# Patient Record
Sex: FEMALE | Race: WHITE | NOT HISPANIC OR LATINO | Employment: OTHER | URBAN - METROPOLITAN AREA
[De-identification: names, ages, dates, MRNs, and addresses within clinical notes are randomized per-mention and may not be internally consistent; named-entity substitution may affect disease eponyms.]

---

## 2017-01-10 ENCOUNTER — CLINICAL SUPPORT (OUTPATIENT)
Dept: OBSTETRICS AND GYNECOLOGY | Facility: CLINIC | Age: 47
End: 2017-01-10
Payer: MEDICAID

## 2017-01-10 VITALS — BODY MASS INDEX: 51.64 KG/M2 | WEIGHT: 280.63 LBS | HEIGHT: 62 IN

## 2017-01-10 DIAGNOSIS — N95.1 MENOPAUSAL STATE: Primary | ICD-10-CM

## 2017-01-10 PROCEDURE — 99999 PR PBB SHADOW E&M-EST. PATIENT-LVL III: CPT | Mod: PBBFAC,,,

## 2017-01-10 PROCEDURE — 96372 THER/PROPH/DIAG INJ SC/IM: CPT | Mod: PBBFAC

## 2017-01-10 PROCEDURE — 99213 OFFICE O/P EST LOW 20 MIN: CPT | Mod: PBBFAC

## 2017-01-10 RX ORDER — TESTOSTERONE CYPIONATE 200 MG/ML
50 INJECTION, SOLUTION INTRAMUSCULAR
Status: COMPLETED | OUTPATIENT
Start: 2017-01-10 | End: 2017-01-10

## 2017-01-10 RX ADMIN — TESTOSTERONE CYPIONATE 50 MG: 200 INJECTION, SOLUTION INTRAMUSCULAR at 01:01

## 2017-01-10 RX ADMIN — ESTRADIOL CYPIONATE 5 MG: 5 INJECTION INTRAMUSCULAR at 01:01

## 2017-01-24 ENCOUNTER — TELEPHONE (OUTPATIENT)
Dept: OBSTETRICS AND GYNECOLOGY | Facility: CLINIC | Age: 47
End: 2017-01-24

## 2017-01-24 NOTE — TELEPHONE ENCOUNTER
1/24/17 @ 09:51 am cst--  Spoke with Ms Arreola, she stated she has been having a yeast infection, itching really bad, she stated she tried douching, informed to not to douche at this time. Appt made for 1/26/17 @ 10:45am , informed her she can try eating yogurt to put good kannan back into her system to help with control yeast growth, continue to use vaginal cream externally to help with itching. Pt stated her understanding          ----- Message from Darryl Andrews sent at 1/24/2017  9:30 AM CST -----  Contact: Self  Pt states she's having vaginal itching & believes she has a yeast infection. Pt states she douched on last night & wants to know if that will affect the infection. Pt can be reached @ [phone]

## 2017-01-26 ENCOUNTER — OFFICE VISIT (OUTPATIENT)
Dept: OBSTETRICS AND GYNECOLOGY | Facility: CLINIC | Age: 47
End: 2017-01-26
Payer: MEDICAID

## 2017-01-26 ENCOUNTER — TELEPHONE (OUTPATIENT)
Dept: OBSTETRICS AND GYNECOLOGY | Facility: CLINIC | Age: 47
End: 2017-01-26

## 2017-01-26 VITALS
HEIGHT: 62 IN | DIASTOLIC BLOOD PRESSURE: 74 MMHG | SYSTOLIC BLOOD PRESSURE: 144 MMHG | BODY MASS INDEX: 51.89 KG/M2 | WEIGHT: 282 LBS

## 2017-01-26 DIAGNOSIS — Z90.710 STATUS POST HYSTERECTOMY: ICD-10-CM

## 2017-01-26 DIAGNOSIS — N76.1 CHRONIC VAGINITIS: ICD-10-CM

## 2017-01-26 DIAGNOSIS — N95.1 MENOPAUSAL STATE: Primary | ICD-10-CM

## 2017-01-26 PROCEDURE — 99213 OFFICE O/P EST LOW 20 MIN: CPT | Mod: PBBFAC | Performed by: OBSTETRICS & GYNECOLOGY

## 2017-01-26 PROCEDURE — 87480 CANDIDA DNA DIR PROBE: CPT

## 2017-01-26 PROCEDURE — 99213 OFFICE O/P EST LOW 20 MIN: CPT | Mod: S$PBB,,, | Performed by: OBSTETRICS & GYNECOLOGY

## 2017-01-26 PROCEDURE — 99999 PR PBB SHADOW E&M-EST. PATIENT-LVL III: CPT | Mod: PBBFAC,,, | Performed by: OBSTETRICS & GYNECOLOGY

## 2017-01-26 RX ORDER — TERCONAZOLE 80 MG/1
80 SUPPOSITORY VAGINAL DAILY
Qty: 3 SUPPOSITORY | Refills: 1 | Status: SHIPPED | OUTPATIENT
Start: 2017-01-26 | End: 2017-07-27

## 2017-01-26 NOTE — MR AVS SNAPSHOT
[facility]  [address], [address]  [place] [address]  Phone: [phone]                  Haydee Arreola   2017 10:45 AM   Office Visit    Description:  Female : 1970   Provider:  Carlos Delgado MD   Department:  [facility]           Reason for Visit     Vaginal Discharge           Diagnoses this Visit        Comments    Menopausal state    -  Primary     Status post hysterectomy         Chronic vaginitis                To Do List           Future Appointments        Provider Department Dept Phone    2017 1:00 PM INJECTION, WB OB-GYN [facility]/ -[phone]      Goals (5 Years of Data)     None      Follow-Up and Disposition     Return in about 2 weeks (around 2017).       These Medications        Disp Refills Start End    terconazole (TERAZOL 3) 80 mg vaginal suppository 3 suppository 1 2017     Place 1 suppository (80 mg total) vaginally once daily. - Vaginal    Pharmacy: [facility] - [facility] [address] Ph #: [phone]         [facility] On Call     [facility] On Call Nurse Care Line -  Assistance  Registered nurses in the [facility] On Call Center provide clinical advisement, health education, appointment booking, and other advisory services.  Call for this free service at [phone].             Medications           Message regarding Medications     Verify the changes and/or additions to your medication regime listed below are the same as discussed with your clinician today.  If any of these changes or additions are incorrect, please notify your healthcare provider.        START taking these NEW medications        Refills    terconazole (TERAZOL 3) 80 mg vaginal suppository 1    Sig: Place 1 suppository (80 mg total) vaginally once daily.    Class: Normal    Route: Vaginal           Verify that the below list of medications is an accurate representation of the medications you are currently taking.  " If none reported, the list may be blank. If incorrect, please contact your healthcare provider. Carry this list with you in case of emergency.           Current Medications     ADVAIR DISKUS 500-50 mcg/dose DsDv diskus inhaler Inhale 1 puff into the lungs 2 (two) times daily.    albuterol (ACCUNEB) 0.63 mg/3 mL Nebu Take 0.63 mg by nebulization every 6 (six) hours as needed.    alprazolam (XANAX) 1 MG tablet Take 1 mg by mouth 5 (five) times daily.    carisoprodol (SOMA) 350 MG tablet Take 350 mg by mouth 4 (four) times daily as needed.    diazepam (VALIUM) 10 MG Tab Take 10 mg by mouth 2 (two) times daily.    estradiol valerate (DELESTROGEN) 20 mg/mL injection Inject 1 mL (20 mg total) into the muscle every 28 days.    etodolac (LODINE) 200 MG Cap Take 200 mg by mouth 3 (three) times daily.    fluticasone-salmeterol 250-50 mcg/dose (ADVAIR) 250-50 mcg/dose diskus inhaler Inhale 1 puff into the lungs 2 (two) times daily.    hydrocodone-acetaminophen 10-325mg (NORCO)  mg Tab Take by mouth.    levothyroxine (SYNTHROID) 25 MCG tablet Take 25 mcg by mouth once daily. Unsure of doseage    meperidine (DEMEROL) 100 MG tablet Take 1 tablet (100 mg total) by mouth every 4 (four) hours as needed.    pantoprazole (PROTONIX) 40 MG tablet Take 40 mg by mouth once daily.    terconazole (TERAZOL 3) 80 mg vaginal suppository Place 1 suppository (80 mg total) vaginally once daily.    terconazole (TERAZOL 3) 80 mg vaginal suppository Place 1 suppository (80 mg total) vaginally once daily.    VENTOLIN HFA 90 mcg/actuation inhaler 2 puffs every 4 to 6 hours as needed.           Clinical Reference Information           Vital Signs - Last Recorded  Most recent update: 1/26/2017 11:00 AM by Radha Ruiz MA    BP Ht Wt LMP BMI    (!) 144/74 5' 2" (1.575 m) 127.9 kg (282 lb) 10/23/2013 51.58 kg/m2      Blood Pressure          Most Recent Value    BP  (!)  144/74      Allergies as of 1/26/2017     Codeine    Toradol [Ketorolac]    " Ultram [Tramadol]      Immunizations Administered on Date of Encounter - 1/26/2017     None      [facility] Sign-Up     Activating your [facility] account is as easy as 1-2-3!     1) Visit [email or web address], select Sign Up Now, enter this activation code and your date of birth, then select Next.  [email or web address]  Expires: 3/12/2017 10:57 AM      2) Create a username and password to use when you visit [facility] in the future and select a security question in case you lose your password and select Next.    3) Enter your e-mail address and click Sign Up!    Additional Information  If you have questions, please e-mail [email or web address] or call [phone] to talk to our [facility] staff. Remember, [facility] is NOT to be used for urgent needs. For medical emergencies, dial 911.         Smoking Cessation     If you would like to quit smoking:   You may be eligible for free services if you are a [place] resident and started smoking cigarettes before September 1, 1988.  Call the Smoking Cessation Trust (SCT) toll free at (502) 145-4771 or [phone] [phone].   Call 5-510-QUIT-NOW if you do not meet the above criteria.

## 2017-01-26 NOTE — TELEPHONE ENCOUNTER
----- Message from Capri Bello sent at 1/26/2017  2:27 PM CST -----  Contact: [facility] [place]  terconazole (TERAZOL 3) 80 mg vaginal suppository isn't covered by pt insurance by cream form is. Please call pharmacy to change. [phone]    01/26/2017 2:43 PM  Spoke with Ankit from [employer] and informed her she can substitute the suppositories for the cream

## 2017-01-26 NOTE — PROGRESS NOTES
Subjective:      Chief Complaint:  MENOPAUSAL  Chief Complaint   Patient presents with    Vaginal Discharge       Menstrual History:    OB History      Para Term  AB TAB SAB Ectopic Multiple Living    4         2          Menarche age: 11     Patient's last menstrual period was 10/23/2013.           Objective:        History of Present Illness AND  Examination detailed DICTATE:       HISTORY OF PRESENT ILLNESS:  The patient is a 46-year-old lady.  The patient is    4, para 2.  Post-hysterectomy, BSO, supracervical.  She is on   Depo-Estradiol replacement and testosterone replacement.  Pap smear in 2016 was   negative, mammogram was negative.    PAST HISTORY:  Problem list is reviewed.    PAST SURGICAL HISTORY:   x2, appendectomy, hysterectomy, BSO.    The patient had presented to the clinic because of vaginal itching and burning.    Last visit in 2016.  The patient was found to have candidal vaginitis.    PHYSICAL EXAMINATION:  VITAL SIGNS:  Blood pressure 126/76, weight 282.  ABDOMEN:  Soft.  No guarding, rebound tenderness.  PELVIC:  External normal.  Vulva normal.  Bartholin, urethral and [place] glands   are negative.  Vagina got slight whitish discharge.  Cervix present.  Uterus and   adnexa absent.  Good pelvic support.    IMPRESSION:  Most likely candidal vaginitis.    PLAN:  Vaginal culture.  We will give the patient some Terazol vaginal   suppository to be used nightly for three nights.  If the culture grows out any   other thing, then we will call in a proper medication at that time.      MAHAMED  dd: 2017 11:05:00 (CST)  td: 2017 08:38:25 (CST)  Doc ID   #[id number]  Job ID #[id number]    CC:           Assessment:      Diagnosis: VAGINITIS   MENOPAUSAL       Plan:      Return in 6  months

## 2017-01-27 LAB
CANDIDA RRNA VAG QL PROBE: POSITIVE
G VAGINALIS RRNA GENITAL QL PROBE: NEGATIVE
T VAGINALIS RRNA GENITAL QL PROBE: NEGATIVE

## 2017-01-31 ENCOUNTER — TELEPHONE (OUTPATIENT)
Dept: OBSTETRICS AND GYNECOLOGY | Facility: CLINIC | Age: 47
End: 2017-01-31

## 2017-01-31 NOTE — TELEPHONE ENCOUNTER
----- Message from Carlos Delgado MD sent at 1/30/2017 12:24 PM CST -----  Your vaginal culture is POS   FOR   YEAST    01/31/2017 2:44 PM  Pt aware she tested + for yeast and meds have been called into her pharmacy

## 2017-02-06 ENCOUNTER — TELEPHONE (OUTPATIENT)
Dept: OBSTETRICS AND GYNECOLOGY | Facility: CLINIC | Age: 47
End: 2017-02-06

## 2017-02-06 NOTE — TELEPHONE ENCOUNTER
----- Message from Elvia Charles sent at 2/6/2017  2:28 PM CST -----  Contact: self  Pt would like to speak to the nurse regarding about her injection. Pt can be reach at [phone]. Thanks!       02/06/2017 4:12 PM  Spoke with pt and informed her that injections will be in sometime at the end of this month

## 2017-02-08 ENCOUNTER — CLINICAL SUPPORT (OUTPATIENT)
Dept: OBSTETRICS AND GYNECOLOGY | Facility: CLINIC | Age: 47
End: 2017-02-08
Payer: MEDICAID

## 2017-02-08 VITALS
WEIGHT: 276.44 LBS | SYSTOLIC BLOOD PRESSURE: 132 MMHG | DIASTOLIC BLOOD PRESSURE: 84 MMHG | BODY MASS INDEX: 50.56 KG/M2

## 2017-02-08 DIAGNOSIS — Z79.890 POSTMENOPAUSAL HRT (HORMONE REPLACEMENT THERAPY): Primary | ICD-10-CM

## 2017-02-08 PROCEDURE — 99999 PR PBB SHADOW E&M-EST. PATIENT-LVL III: CPT | Mod: PBBFAC,,,

## 2017-02-08 PROCEDURE — 99213 OFFICE O/P EST LOW 20 MIN: CPT | Mod: PBBFAC

## 2017-02-08 PROCEDURE — 96372 THER/PROPH/DIAG INJ SC/IM: CPT | Mod: PBBFAC

## 2017-02-08 RX ORDER — TESTOSTERONE CYPIONATE 200 MG/ML
50 INJECTION, SOLUTION INTRAMUSCULAR ONCE
Status: COMPLETED | OUTPATIENT
Start: 2017-02-08 | End: 2017-02-08

## 2017-02-08 RX ADMIN — ESTRADIOL VALERATE 20 MG: 20 INJECTION, SOLUTION INTRAMUSCULAR at 01:02

## 2017-02-08 RX ADMIN — TESTOSTERONE CYPIONATE 50 MG: 200 INJECTION, SOLUTION INTRAMUSCULAR at 01:02

## 2017-02-08 NOTE — MR AVS SNAPSHOT
[facility]  [address]  [address]  [place] [address]  Phone: [phone]                  Haydee Arreola   2017 2:00 PM   Clinical Support    Description:  Female : 1970   Provider:  INJECTION, WB OB-GYN   Department:  [facility] - [facility]/ [facility]           Reason for Visit     Injections                To Do List           Future Appointments        Provider Department Dept Phone    2017 2:00 PM INJECTION,  OB[facility]N [facility] -[phone]    3/7/2017 2:00 PM INJECTION,  OB-GYN [facility]/ -[phone]      Goals (5 Years of Data)     None      [facility] On Call     [facility] On [facility] Nurse [facility] Line -  Assistance  Registered nurses in the [facility] Call Center provide clinical advisement, health education, appointment booking, and other advisory services.  Call for this free service at [phone].             Medications           Message regarding Medications     Verify the changes and/or additions to your medication regime listed below are the same as discussed with your clinician today.  If any of these changes or additions are incorrect, please notify your healthcare provider.             Verify that the below list of medications is an accurate representation of the medications you are currently taking.  If none reported, the list may be blank. If incorrect, please contact your healthcare provider. Carry this list with you in case of emergency.           Current Medications     ADVAIR DISKUS 500-50 mcg/dose DsDv diskus inhaler Inhale 1 puff into the lungs 2 (two) times daily.    albuterol (ACCUNEB) 0.63 mg/3 mL Nebu Take 0.63 mg by nebulization every 6 (six) hours as needed.    alprazolam (XANAX) 1 MG tablet Take 1 mg by mouth 5 (five) times daily.    carisoprodol (SOMA) 350 MG tablet Take 350 mg by mouth 4 (four) times daily as needed.    diazepam (VALIUM) 10 MG Tab Take 10 mg by mouth 2 (two) times daily.    estradiol valerate (DELESTROGEN) 20  mg/mL injection Inject 1 mL (20 mg total) into the muscle every 28 days.    etodolac (LODINE) 200 MG Cap Take 200 mg by mouth 3 (three) times daily.    fluticasone-salmeterol 250-50 mcg/dose (ADVAIR) 250-50 mcg/dose diskus inhaler Inhale 1 puff into the lungs 2 (two) times daily.    hydrocodone-acetaminophen 10-325mg (NORCO)  mg Tab Take by mouth.    levothyroxine (SYNTHROID) 25 MCG tablet Take 25 mcg by mouth once daily. Unsure of doseage    meperidine (DEMEROL) 100 MG tablet Take 1 tablet (100 mg total) by mouth every 4 (four) hours as needed.    pantoprazole (PROTONIX) 40 MG tablet Take 40 mg by mouth once daily.    terconazole (TERAZOL 3) 80 mg vaginal suppository Place 1 suppository (80 mg total) vaginally once daily.    terconazole (TERAZOL 3) 80 mg vaginal suppository Place 1 suppository (80 mg total) vaginally once daily.    VENTOLIN HFA 90 mcg/actuation inhaler 2 puffs every 4 to 6 hours as needed.           Clinical Reference Information           Your Vitals Were     BP Weight Last Period BMI       132/84 125.4 kg (276 lb 7.3 oz) 10/23/2013 50.56 kg/m2       Blood Pressure          Most Recent Value    BP  132/84      Allergies as of 2/8/2017     Codeine    Toradol [Ketorolac]    Ultram [Tramadol]      Immunizations Administered on Date of Encounter - 2/8/2017     None      Administrations This Visit     estradiol valerate (DELESTROGEN) injection 20 mg/mL     Admin Date Action Dose Route Administered By             02/08/2017 Given 20 mg Intramuscular Kamar Aiken LPN                      [facility] Sign-Up     Activating your [facility] account is as easy as 1-2-3!     1) Visit [email or web address], select Sign Up Now, enter this activation code and your date of birth, then select Next.  [email or web address]  Expires: 3/12/2017 10:57 AM      2) Create a username and password to use when you visit [facility] in the future and select a security question in case you lose your password and select Next.    3)  Enter your e-mail address and click Sign Up!    Additional Information  If you have questions, please e-mail [email or web address] or call [phone] to talk to our [facility] staff. Remember, [facility] is NOT to be used for urgent needs. For medical emergencies, dial 911.         Smoking Cessation     If you would like to quit smoking:   You may be eligible for free services if you are a [place] resident and started smoking cigarettes before September 1, 1988.  Call the Smoking Cessation Trust ([facility]) toll free at (065) 466-6845 or (775) 402-3031.   Call 5-149-QUIT-NOW if you do not meet the above criteria.            Language Assistance Services     ATTENTION: Language assistance services are available, free of charge. Please call [phone].      ATENCIÓN: Si habla leticia, tiene a grayson disposición servicios gratuitos de asistencia lingüística. Llame al [phone].     CHÚ Ý: N?u b?n nói Ti?ng Vi?t, có các d?ch v? h? tr? ngôn ng? mi?n phí dành cho b?n. G?i s? [phone].         [facility] complies with applicable Federal civil rights laws and does not discriminate on the basis of race, color, national origin, age, disability, or sex.

## 2017-03-07 ENCOUNTER — TELEPHONE (OUTPATIENT)
Dept: OBSTETRICS AND GYNECOLOGY | Facility: CLINIC | Age: 47
End: 2017-03-07

## 2017-03-08 ENCOUNTER — TELEPHONE (OUTPATIENT)
Dept: OBSTETRICS AND GYNECOLOGY | Facility: CLINIC | Age: 47
End: 2017-03-08

## 2017-03-09 ENCOUNTER — CLINICAL SUPPORT (OUTPATIENT)
Dept: OBSTETRICS AND GYNECOLOGY | Facility: CLINIC | Age: 47
End: 2017-03-09
Payer: MEDICAID

## 2017-03-09 VITALS — DIASTOLIC BLOOD PRESSURE: 64 MMHG | WEIGHT: 273.38 LBS | SYSTOLIC BLOOD PRESSURE: 132 MMHG | BODY MASS INDEX: 50 KG/M2

## 2017-03-09 DIAGNOSIS — Z79.890 POSTMENOPAUSAL HRT (HORMONE REPLACEMENT THERAPY): Primary | ICD-10-CM

## 2017-03-09 PROCEDURE — 99999 PR PBB SHADOW E&M-EST. PATIENT-LVL III: CPT | Mod: PBBFAC,,,

## 2017-03-09 PROCEDURE — 96372 THER/PROPH/DIAG INJ SC/IM: CPT | Mod: PBBFAC

## 2017-03-09 PROCEDURE — 99213 OFFICE O/P EST LOW 20 MIN: CPT | Mod: PBBFAC

## 2017-03-09 RX ORDER — TESTOSTERONE CYPIONATE 200 MG/ML
50 INJECTION, SOLUTION INTRAMUSCULAR ONCE
Status: COMPLETED | OUTPATIENT
Start: 2017-03-09 | End: 2017-03-09

## 2017-03-09 RX ADMIN — ESTRADIOL CYPIONATE 5 MG: 5 INJECTION INTRAMUSCULAR at 11:03

## 2017-03-09 RX ADMIN — TESTOSTERONE CYPIONATE 50 MG: 200 INJECTION, SOLUTION INTRAMUSCULAR at 11:03

## 2017-03-09 NOTE — PROGRESS NOTES
Pt arrived for her HRT injection ( estradiol & depo testosterone) , injection given w/o and incident,

## 2017-03-09 NOTE — MR AVS SNAPSHOT
[facility]  [address]  [address]  [place] [address]  Phone: [phone]                  Haydee Arreola   3/9/2017 11:00 AM   Clinical Support    Description:  Female : 1970   Provider:  INJECTION, WB OB-GYN   Department:  [facility]           Reason for Visit     Injections           Diagnoses this Visit        Comments    Postmenopausal HRT (hormone replacement therapy)    -  Primary            To Do List           Future Appointments        Provider Department Dept Phone    2017 1:00 PM INJECTION,  OB-GYN [facility]/ -[phone]      Goals (5 Years of Data)     None      [facility] On [facility]     [facility] On Call Nurse Care Line -  Assistance  Registered nurses in the [facility] On [facility] Center provide clinical advisement, health education, appointment booking, and other advisory services.  Call for this free service at [phone].             Medications           Message regarding Medications     Verify the changes and/or additions to your medication regime listed below are the same as discussed with your clinician today.  If any of these changes or additions are incorrect, please notify your healthcare provider.        These medications were administered today        Dose Freq    testosterone cypionate injection 50 mg 50 mg Once    Sig: Inject 0.25 mLs (50 mg total) into the muscle once.    Class: Normal    Route: Intramuscular           Verify that the below list of medications is an accurate representation of the medications you are currently taking.  If none reported, the list may be blank. If incorrect, please contact your healthcare provider. Carry this list with you in case of emergency.           Current Medications     ADVAIR DISKUS 500-50 mcg/dose DsDv diskus inhaler Inhale 1 puff into the lungs 2 (two) times daily.    albuterol (ACCUNEB) 0.63 mg/3 mL Nebu Take 0.63 mg by nebulization every 6 (six) hours as needed.    alprazolam (XANAX) 1  MG tablet Take 1 mg by mouth 5 (five) times daily.    carisoprodol (SOMA) 350 MG tablet Take 350 mg by mouth 4 (four) times daily as needed.    diazepam (VALIUM) 10 MG Tab Take 10 mg by mouth 2 (two) times daily.    estradiol valerate (DELESTROGEN) 20 mg/mL injection Inject 1 mL (20 mg total) into the muscle every 28 days.    etodolac (LODINE) 200 MG Cap Take 200 mg by mouth 3 (three) times daily.    fluticasone-salmeterol 250-50 mcg/dose (ADVAIR) 250-50 mcg/dose diskus inhaler Inhale 1 puff into the lungs 2 (two) times daily.    hydrocodone-acetaminophen 10-325mg (NORCO)  mg Tab Take by mouth.    levothyroxine (SYNTHROID) 25 MCG tablet Take 25 mcg by mouth once daily. Unsure of doseage    meperidine (DEMEROL) 100 MG tablet Take 1 tablet (100 mg total) by mouth every 4 (four) hours as needed.    pantoprazole (PROTONIX) 40 MG tablet Take 40 mg by mouth once daily.    terconazole (TERAZOL 3) 80 mg vaginal suppository Place 1 suppository (80 mg total) vaginally once daily.    terconazole (TERAZOL 3) 80 mg vaginal suppository Place 1 suppository (80 mg total) vaginally once daily.    VENTOLIN HFA 90 mcg/actuation inhaler 2 puffs every 4 to 6 hours as needed.           Clinical Reference Information           Your Vitals Were     BP Weight Last Period BMI       132/64 124 kg (273 lb 5.9 oz) 10/23/2013 50 kg/m2       Blood Pressure          Most Recent Value    BP  132/64      Allergies as of 3/9/2017     Codeine    Toradol [Ketorolac]    Ultram [Tramadol]      Immunizations Administered on Date of Encounter - 3/9/2017     None      Administrations This Visit     estradiol cypionate 5 mg/mL injection 5 mg     Admin Date Action Dose Route Administered By             03/09/2017 Given 5 mg Intramuscular Kamar Jackson LPN                    testosterone cypionate injection 50 mg     Admin Date Action Dose Route Administered By             03/09/2017 Given 50 mg Intramuscular Kamar Jackson LPN                       [facility] Sign-Up     Activating your [facility] account is as easy as 1-2-3!     1) Visit [email or web address], select Sign Up Now, enter this activation code and your date of birth, then select Next.  [email or web address]  Expires: 3/12/2017 10:57 AM      2) Create a username and password to use when you visit [facility] in the future and select a security question in case you lose your password and select Next.    3) Enter your e-mail address and click Sign Up!    Additional Information  If you have questions, please e-mail [email or web address] or call [phone] to talk to our [facility] staff. Remember, [facility] is NOT to be used for urgent needs. For medical emergencies, dial 911.         Smoking Cessation     If you would like to quit smoking:   You may be eligible for free services if you are a [place] resident and started smoking cigarettes before September 1, 1988.  Call the Smoking Cessation Trust (SCT) toll free at (299) 557-8200 or ([phone] 173-8829.   Call 6-741-QUIT-NOW if you do not meet the above criteria.            Language Assistance Services     ATTENTION: Language assistance services are available, free of charge. Please call [phone].      ATENCIÓN: Si habla español, tiene a grayson disposición servicios gratuitos de asistencia lingüística. Llame al [phone].     CHÚ Ý: N?u b?n nói Ti?ng Vi?t, có các d?ch v? h? tr? ngôn ng? mi?n phí dành cho b?n. G?i s? [phone].         [facility] complies with applicable Federal civil rights laws and does not discriminate on the basis of race, color, national origin, age, disability, or sex.

## 2017-04-06 ENCOUNTER — CLINICAL SUPPORT (OUTPATIENT)
Dept: OBSTETRICS AND GYNECOLOGY | Facility: CLINIC | Age: 47
End: 2017-04-06
Payer: MEDICAID

## 2017-04-06 VITALS
SYSTOLIC BLOOD PRESSURE: 138 MMHG | DIASTOLIC BLOOD PRESSURE: 74 MMHG | BODY MASS INDEX: 50.97 KG/M2 | WEIGHT: 278.69 LBS

## 2017-04-06 DIAGNOSIS — Z79.890 POSTMENOPAUSAL HRT (HORMONE REPLACEMENT THERAPY): ICD-10-CM

## 2017-04-06 PROCEDURE — 99999 PR PBB SHADOW E&M-EST. PATIENT-LVL III: CPT | Mod: PBBFAC,,,

## 2017-04-06 PROCEDURE — 96372 THER/PROPH/DIAG INJ SC/IM: CPT | Mod: PBBFAC

## 2017-04-06 PROCEDURE — 99213 OFFICE O/P EST LOW 20 MIN: CPT | Mod: PBBFAC,25

## 2017-04-06 RX ORDER — TESTOSTERONE CYPIONATE 200 MG/ML
50 INJECTION, SOLUTION INTRAMUSCULAR ONCE
Status: COMPLETED | OUTPATIENT
Start: 2017-04-06 | End: 2017-04-06

## 2017-04-06 RX ORDER — TESTOSTERONE CYPIONATE 200 MG/ML
50 INJECTION, SOLUTION INTRAMUSCULAR
Status: COMPLETED | OUTPATIENT
Start: 2017-05-04 | End: 2017-10-26

## 2017-04-06 RX ADMIN — ESTRADIOL CYPIONATE 5 MG: 5 INJECTION INTRAMUSCULAR at 01:04

## 2017-04-06 RX ADMIN — TESTOSTERONE CYPIONATE 50 MG: 200 INJECTION, SOLUTION INTRAMUSCULAR at 01:04

## 2017-05-04 ENCOUNTER — OFFICE VISIT (OUTPATIENT)
Dept: OBSTETRICS AND GYNECOLOGY | Facility: CLINIC | Age: 47
End: 2017-05-04
Payer: MEDICAID

## 2017-05-04 ENCOUNTER — CLINICAL SUPPORT (OUTPATIENT)
Dept: OBSTETRICS AND GYNECOLOGY | Facility: CLINIC | Age: 47
End: 2017-05-04
Payer: MEDICAID

## 2017-05-04 VITALS
SYSTOLIC BLOOD PRESSURE: 138 MMHG | WEIGHT: 278 LBS | DIASTOLIC BLOOD PRESSURE: 81 MMHG | HEIGHT: 62 IN | BODY MASS INDEX: 51.16 KG/M2

## 2017-05-04 VITALS — SYSTOLIC BLOOD PRESSURE: 138 MMHG | BODY MASS INDEX: 50.85 KG/M2 | DIASTOLIC BLOOD PRESSURE: 81 MMHG | WEIGHT: 278 LBS

## 2017-05-04 DIAGNOSIS — Z79.890 POSTMENOPAUSAL HRT (HORMONE REPLACEMENT THERAPY): Primary | ICD-10-CM

## 2017-05-04 DIAGNOSIS — N95.1 MENOPAUSAL STATE: ICD-10-CM

## 2017-05-04 DIAGNOSIS — N76.1 CHRONIC VAGINITIS: Primary | ICD-10-CM

## 2017-05-04 DIAGNOSIS — Z79.890 HORMONE REPLACEMENT THERAPY (HRT): ICD-10-CM

## 2017-05-04 DIAGNOSIS — Z90.710 STATUS POST HYSTERECTOMY: ICD-10-CM

## 2017-05-04 PROCEDURE — 99213 OFFICE O/P EST LOW 20 MIN: CPT | Mod: S$PBB,,, | Performed by: OBSTETRICS & GYNECOLOGY

## 2017-05-04 PROCEDURE — 96372 THER/PROPH/DIAG INJ SC/IM: CPT | Mod: PBBFAC

## 2017-05-04 PROCEDURE — 99999 PR PBB SHADOW E&M-EST. PATIENT-LVL III: CPT | Mod: PBBFAC,,,

## 2017-05-04 PROCEDURE — 99213 OFFICE O/P EST LOW 20 MIN: CPT | Mod: PBBFAC,27,25

## 2017-05-04 PROCEDURE — 99999 PR PBB SHADOW E&M-EST. PATIENT-LVL II: CPT | Mod: 25,PBBFAC,, | Performed by: OBSTETRICS & GYNECOLOGY

## 2017-05-04 RX ORDER — ESTRADIOL VALERATE 20 MG/ML
20 INJECTION INTRAMUSCULAR ONCE
Status: COMPLETED | OUTPATIENT
Start: 2017-05-04 | End: 2017-05-04

## 2017-05-04 RX ORDER — CLOTRIMAZOLE AND BETAMETHASONE DIPROPIONATE 10; .64 MG/G; MG/G
CREAM TOPICAL
Qty: 15 G | Refills: 1 | Status: SHIPPED | OUTPATIENT
Start: 2017-05-04 | End: 2017-07-27

## 2017-05-04 RX ADMIN — ESTRADIOL VALERATE 20 MG: 20 INJECTION INTRAMUSCULAR at 11:05

## 2017-05-04 RX ADMIN — TESTOSTERONE CYPIONATE 50 MG: 200 INJECTION, SOLUTION INTRAMUSCULAR at 11:05

## 2017-05-04 NOTE — PROGRESS NOTES
Subjective:      Chief Complaint:    Chief Complaint   Patient presents with    Vaginitis       Menstrual History:    OB History      Para Term  AB TAB SAB Ectopic Multiple Living    4         2          Menarche age: 11     Patient's last menstrual period was 10/23/2013.           Objective:        History of Present Illness AND  Examination detailed DICTATE:       PROBLEM-FOCUSSED EXAMINATION:  The patient is 46 years old, here for examination   and presented to clinic because of vulvar pruritus and itching.    PAST MEDICAL HISTORY:  Reviewed.    PROBLEM LIST:  Reviewed.  Multiple problems.    SURGERY:  , appendectomy, tonsillectomy, hysterectomy, BSO.    The patient is on Depo-Estradiol and Depo-Testosterone.  The patient stated that   she had used some sort of a preparation on the vulva about a week to 10 days   ago.    PHYSICAL EXAMINATION:  VITAL SIGNS:  Blood pressure 138/81, weight 278.  ABDOMEN:  Soft.  No guarding or rebound.  PELVIC:  External, the patient has vulvitis at the introitus and the labia   majora.  Bartholin, urethral and [place] glands are negative.  Vagina clear.  No   discharge.  No inflammation.  No odor.  Cervix, uterus and adnexa are absent.    No pain.    PLAN:  We will do a vaginal culture, rule out the possibility of infection.  In   the past, the patient had recurrent Candida albicans infection.  At the present,   my impression is vulvitis and possible contact.   We gave her Lotrisone   ointment apply twice a day for seven days.  If the culture grows out any   pathogens, we will treat it accordingly.      MAHAMED  dd: 2017 11:01:44 (CDT)  td: 2017 09:29:52 (CDT)  Doc ID   #[id number]  Job ID #[id number]    CC:           Assessment:      Diagnosis:VULVOVAGINITIS       Plan:      Return in 6  months

## 2017-05-04 NOTE — MR AVS SNAPSHOT
Follow-up and Disposition     Return in about 6 months (around 2017).           Haydee Arreola   2017 11:15 AM   Office Visit    Description:  Female : 1970   Provider:  Carlos Delgado MD   Department:  [facility] [place] - OB/ GYN           Reason for Visit     Vaginitis     Reason for Visit History      Diagnoses this Visit        Comments    Chronic vaginitis    -  Primary     Menopausal state         Status post hysterectomy         Hormone replacement therapy (HRT)           Problem List as of 2017     Encounter for routine gynecological examination    Inflammation of vagina    Change of life    History of hysterectomy    Nipple discharge    Sexual dysfunction    Postmenopausal vaginal dryness    Encounter for routine gynecological examination    Hormone replacement therapy (HRT)      Goals     None      Vital Signs/Measurements  Most recent update: 2017 10:47 AM by Cher Lugo MA    BP Wt BMI          138/81 126.1 kg (278 lb) 50.85 kg/m2           Medications and Orders      Your Current Medications Are        Disp Refills Start End    ADVAIR DISKUS 500-50 mcg/dose DsDv diskus inhaler  4 2015     Sig - Route: Inhale 1 puff into the lungs 2 (two) times daily. - Inhalation    Class: Historical Med    albuterol (ACCUNEB) 0.63 mg/3 mL Nebu        Sig - Route: Take 0.63 mg by nebulization every 6 (six) hours as needed. - Nebulization    Class: Historical Med    alprazolam (XANAX) 1 MG tablet        Sig - Route: Take 1 mg by mouth 5 (five) times daily. - Oral    Class: Historical Med    carisoprodol (SOMA) 350 MG tablet        Sig - Route: Take 350 mg by mouth 4 (four) times daily as needed. - Oral    Class: Historical Med    clotrimazole-betamethasone 1-0.05% (LOTRISONE) cream 15 g 1 2017    Sig: Apply to affected area 2 times daily    diazepam (VALIUM) 10 MG Tab        Sig - Route: Take 10 mg by mouth 2 (two) times daily. - Oral    Class: Historical Med    estradiol  valerate (DELESTROGEN) 20 mg/mL injection 1 mL 0 2016    Sig - Route: Inject 1 mL (20 mg total) into the muscle every 28 days. - Intramuscular    etodolac (LODINE) 200 MG Cap        Sig - Route: Take 200 mg by mouth 3 (three) times daily. - Oral    Class: Historical Med    fluticasone-salmeterol 250-50 mcg/dose (ADVAIR) 250-50 mcg/dose diskus inhaler        Sig - Route: Inhale 1 puff into the lungs 2 (two) times daily. - Inhalation    Class: Historical Med    hydrocodone-acetaminophen 10-325mg (NORCO)  mg Tab        Sig - Route: Take by mouth. - Oral    Class: Historical Med    levothyroxine (SYNTHROID) 25 MCG tablet        Sig - Route: Take 25 mcg by mouth once daily. Unsure of doseage - Oral    Class: Historical Med    meperidine (DEMEROL) 100 MG tablet 40 tablet 0 2013     Sig - Route: Take 1 tablet (100 mg total) by mouth every 4 (four) hours as needed. - Oral    Class: Print    pantoprazole (PROTONIX) 40 MG tablet        Sig - Route: Take 40 mg by mouth once daily. - Oral    Class: Historical Med    terconazole (TERAZOL 3) 80 mg vaginal suppository 6 suppository 1 2016     Sig - Route: Place 1 suppository (80 mg total) vaginally once daily. - Vaginal    terconazole (TERAZOL 3) 80 mg vaginal suppository 3 suppository 1 2017     Sig - Route: Place 1 suppository (80 mg total) vaginally once daily. - Vaginal    VENTOLIN HFA 90 mcg/actuation inhaler  4 2015     Si puffs every 4 to 6 hours as needed.    Class: Historical Med      Medications Ordered this Visit                   [facility] [place] [facility] - [address]   [address] [address]    Telephone:  [phone]   Fax:  [phone]   Hours:                  E-Prescribed (1 of )         clotrimazole-betamethasone 1-0.05% (LOTRISONE) cream    Sig: Apply to affected area 2 times daily       Start: 17     Quantity: 15 g Refills: 1                   Orders placed at this  visit     Vaginosis Screen by DNA Probe       To-Do List     Future Appointments Provider Department Dept Phone    5/4/2017 1:00 PM INJECTION,  OB-GYN [facility] [place] [facility]/ -[phone]    Arrive at check-in approximately 15 minutes before your scheduled appointment time. Bring all outside medical records and imaging, along with a list of your current medications and insurance card.    Follow-Up    Return in about 6 months (around 11/4/2017).      We Performed the Following     Vaginosis Screen by DNA Probe [VGS741 Custom]             Allergies as of 5/4/2017     Codeine    Toradol [Ketorolac]    Ultram [Tramadol]      Immunizations Administered on Date of Encounter - 5/4/2017     None      Future Appointments        Provider Department Center    5/4/2017 11:15 AM Carlos Delgado MD [facility] [place] [facility]    5/4/2017 1:00 PM INJECTION,  [facility] [place]         Result Summary      [facility] Sign-Up     Activating your [facility] account is as easy as 1-2-3!     1) Visit [email or web address], select Sign Up Now, enter this activation code and your date of birth, then select Next.  [email or web address]  Expires: 6/18/2017 10:47 AM      2) Create a username and password to use when you visit [facility] in the future and select a security question in case you lose your password and select Next.    3) Enter your e-mail address and click Sign Up!    Additional Information  If you have questions, please e-mail [email or web address] or call [phone] to talk to our [facility] staff. Remember, [facility] is NOT to be used for urgent needs. For medical emergencies, dial 911.

## 2017-05-04 NOTE — MR AVS SNAPSHOT
[facility]/ [facility]  [address] [facility] [address]  [place] [address]  Phone: [phone]                  Haydee Arreola   2017 11:15 AM   Office Visit    Description:  Female : 1970   Provider:  Carlos Delgado MD   Department:  [facility]/ GYN           Reason for Visit     Vaginitis                To Do List           Future Appointments        Provider Department Dept Phone    2017 11:15 AM Carlos Delgado MD [facility]/ -[phone]    2017 1:00 PM INJECTION, [facility] OB-GYN [facility] [phone]      Goals (5 Years of Data)     None      [facility] On Call     [facility] Nurse Care Line -  Assistance  Unless otherwise directed by your provider, please contact [facility], our nurse care line that is available for  assistance.     Registered nurses in the [facility] provide: appointment scheduling, clinical advisement, health education, and other advisory services.  Call: [phone] (toll free)               Medications           Message regarding Medications     Verify the changes and/or additions to your medication regime listed below are the same as discussed with your clinician today.  If any of these changes or additions are incorrect, please notify your healthcare provider.             Verify that the below list of medications is an accurate representation of the medications you are currently taking.  If none reported, the list may be blank. If incorrect, please contact your healthcare provider. Carry this list with you in case of emergency.           Current Medications     ADVAIR DISKUS 500-50 mcg/dose DsDv diskus inhaler Inhale 1 puff into the lungs 2 (two) times daily.    albuterol (ACCUNEB) 0.63 mg/3 mL Nebu Take 0.63 mg by nebulization every 6 (six) hours as needed.    alprazolam (XANAX) 1 MG tablet Take 1 mg by mouth 5 (five) times daily.    carisoprodol (SOMA) 350 MG tablet Take 350 mg by mouth 4 (four) times daily as  "needed.    diazepam (VALIUM) 10 MG Tab Take 10 mg by mouth 2 (two) times daily.    estradiol valerate (DELESTROGEN) 20 mg/mL injection Inject 1 mL (20 mg total) into the muscle every 28 days.    etodolac (LODINE) 200 MG Cap Take 200 mg by mouth 3 (three) times daily.    fluticasone-salmeterol 250-50 mcg/dose (ADVAIR) 250-50 mcg/dose diskus inhaler Inhale 1 puff into the lungs 2 (two) times daily.    hydrocodone-acetaminophen 10-325mg (NORCO)  mg Tab Take by mouth.    levothyroxine (SYNTHROID) 25 MCG tablet Take 25 mcg by mouth once daily. Unsure of doseage    meperidine (DEMEROL) 100 MG tablet Take 1 tablet (100 mg total) by mouth every 4 (four) hours as needed.    pantoprazole (PROTONIX) 40 MG tablet Take 40 mg by mouth once daily.    terconazole (TERAZOL 3) 80 mg vaginal suppository Place 1 suppository (80 mg total) vaginally once daily.    terconazole (TERAZOL 3) 80 mg vaginal suppository Place 1 suppository (80 mg total) vaginally once daily.    VENTOLIN HFA 90 mcg/actuation inhaler 2 puffs every 4 to 6 hours as needed.           Clinical Reference Information           Your Vitals Were     BP Height Weight Last Period BMI    138/81 5' 2" (1.575 m) 126.1 kg (278 lb) 10/23/2013 50.85 kg/m2      Blood Pressure          Most Recent Value    BP  138/81      Allergies as of 5/4/2017     Codeine    Toradol [Ketorolac]    Ultram [Tramadol]      Immunizations Administered on Date of Encounter - 5/4/2017     None      [facility] Sign-Up     Activating your [facility] account is as easy as 1-2-3!     1) Visit [email or web address], select Sign Up Now, enter this activation code and your date of birth, then select Next.  [email or web address]  Expires: 6/18/2017 10:47 AM      2) Create a username and password to use when you visit [facility] in the future and select a security question in case you lose your password and select Next.    3) Enter your e-mail address and click Sign Up!    Additional Information  If you have " questions, please e-mail [email or web address] or call [phone] to talk to our [facility] staff. Remember, [facility] is NOT to be used for urgent needs. For medical emergencies, dial 911.         Smoking Cessation     If you would like to quit smoking:   You may be eligible for free services if you are a [place] resident and started smoking cigarettes before September 1, 1988.  Call the Smoking Cessation Trust ([facility]) toll free at [phone] [phone] or [phone].   Call 1-800-QUIT-NOW if you do not meet the above criteria.   Contact us via email: [email or web address]   View our website for more information: [email or web address]        Language Assistance Services     ATTENTION: Language assistance services are available, free of charge. Please call [phone].      ATENCIÓN: Si habla español, tiene a grayson disposición servicios gratuitos de asistencia lingüística. Llame al [phone].     CHÚ Ý: N?u b?n nói Ti?ng Vi?t, có các d?ch v? h? tr? ngôn ng? mi?n phí dành cho b?n. G?i s? [phone].         [facility] complies with applicable Federal civil rights laws and does not discriminate on the basis of race, color, national origin, age, disability, or sex.

## 2017-05-05 LAB
CANDIDA RRNA VAG QL PROBE: NEGATIVE
G VAGINALIS RRNA GENITAL QL PROBE: NEGATIVE
T VAGINALIS RRNA GENITAL QL PROBE: NEGATIVE

## 2017-05-08 ENCOUNTER — TELEPHONE (OUTPATIENT)
Dept: OBSTETRICS AND GYNECOLOGY | Facility: CLINIC | Age: 47
End: 2017-05-08

## 2017-05-08 NOTE — TELEPHONE ENCOUNTER
----- Message from Carlos Delgado MD sent at 5/8/2017 12:18 PM CDT -----  Your vaginal culture is normal.

## 2017-05-22 RX ORDER — FLUCONAZOLE 150 MG/1
TABLET ORAL
Qty: 1 TABLET | OUTPATIENT
Start: 2017-05-22

## 2017-06-01 ENCOUNTER — OFFICE VISIT (OUTPATIENT)
Dept: OBSTETRICS AND GYNECOLOGY | Facility: CLINIC | Age: 47
End: 2017-06-01
Payer: MEDICAID

## 2017-06-01 ENCOUNTER — CLINICAL SUPPORT (OUTPATIENT)
Dept: OBSTETRICS AND GYNECOLOGY | Facility: CLINIC | Age: 47
End: 2017-06-01
Payer: MEDICAID

## 2017-06-01 VITALS
DIASTOLIC BLOOD PRESSURE: 76 MMHG | WEIGHT: 277.56 LBS | SYSTOLIC BLOOD PRESSURE: 140 MMHG | BODY MASS INDEX: 50.77 KG/M2

## 2017-06-01 VITALS
DIASTOLIC BLOOD PRESSURE: 81 MMHG | SYSTOLIC BLOOD PRESSURE: 143 MMHG | HEIGHT: 62 IN | BODY MASS INDEX: 50.97 KG/M2 | WEIGHT: 277 LBS

## 2017-06-01 DIAGNOSIS — Z90.710 STATUS POST HYSTERECTOMY: Primary | ICD-10-CM

## 2017-06-01 DIAGNOSIS — Z79.890 POST-MENOPAUSE ON HRT (HORMONE REPLACEMENT THERAPY): Primary | ICD-10-CM

## 2017-06-01 DIAGNOSIS — N76.3 CHRONIC VULVITIS: ICD-10-CM

## 2017-06-01 DIAGNOSIS — N76.1 CHRONIC VAGINITIS: ICD-10-CM

## 2017-06-01 PROCEDURE — 99213 OFFICE O/P EST LOW 20 MIN: CPT | Mod: S$PBB,,, | Performed by: OBSTETRICS & GYNECOLOGY

## 2017-06-01 PROCEDURE — 87480 CANDIDA DNA DIR PROBE: CPT

## 2017-06-01 PROCEDURE — 99999 PR PBB SHADOW E&M-EST. PATIENT-LVL IV: CPT | Mod: PBBFAC,,, | Performed by: OBSTETRICS & GYNECOLOGY

## 2017-06-01 PROCEDURE — 99999 PR PBB SHADOW E&M-EST. PATIENT-LVL III: CPT | Mod: PBBFAC,,,

## 2017-06-01 PROCEDURE — 99214 OFFICE O/P EST MOD 30 MIN: CPT | Mod: PBBFAC,27 | Performed by: OBSTETRICS & GYNECOLOGY

## 2017-06-01 RX ORDER — BENZONATATE 200 MG/1
CAPSULE ORAL
Refills: 0 | COMMUNITY
Start: 2017-03-14

## 2017-06-01 RX ORDER — LEVOFLOXACIN 750 MG/1
750 TABLET ORAL DAILY
Refills: 0 | COMMUNITY
Start: 2017-05-30 | End: 2017-07-27

## 2017-06-01 RX ORDER — CYCLOBENZAPRINE HCL 10 MG
TABLET ORAL
COMMUNITY
Start: 2017-04-24

## 2017-06-01 RX ORDER — PREDNISONE 20 MG/1
40 TABLET ORAL DAILY
Refills: 0 | COMMUNITY
Start: 2017-03-14 | End: 2017-07-27

## 2017-06-01 RX ORDER — CLOTRIMAZOLE AND BETAMETHASONE DIPROPIONATE 10; .64 MG/G; MG/G
CREAM TOPICAL
Qty: 15 G | Refills: 2 | Status: SHIPPED | OUTPATIENT
Start: 2017-06-01 | End: 2018-10-17 | Stop reason: SDUPTHER

## 2017-06-01 RX ORDER — ESTRADIOL VALERATE 20 MG/ML
20 INJECTION INTRAMUSCULAR
Status: COMPLETED | OUTPATIENT
Start: 2017-06-01 | End: 2017-06-01

## 2017-06-01 RX ORDER — ALPRAZOLAM 2 MG/1
2 TABLET ORAL 2 TIMES DAILY
Refills: 0 | COMMUNITY
Start: 2017-05-22

## 2017-06-01 RX ORDER — FLUTICASONE PROPIONATE 50 MCG
1 SPRAY, SUSPENSION (ML) NASAL 2 TIMES DAILY
Refills: 0 | COMMUNITY
Start: 2017-03-14 | End: 2018-07-18

## 2017-06-01 RX ADMIN — ESTRADIOL VALERATE 20 MG: 20 INJECTION INTRAMUSCULAR at 01:06

## 2017-06-01 RX ADMIN — TESTOSTERONE CYPIONATE 50 MG: 200 INJECTION, SOLUTION INTRAMUSCULAR at 01:06

## 2017-06-01 NOTE — PROGRESS NOTES
Subjective:      Chief Complaint:    Chief Complaint   Patient presents with    Vaginitis       Menstrual History:    OB History      Para Term  AB Living    4     2    SAB TAB Ectopic Multiple Live Births                  Menarche age: 11     Patient's last menstrual period was 10/23/2013.           Objective:        History of Present Illness AND  Examination detailed DICTATE:       PROBLEM-FOCUSSED EXAMINATION:  The patient is 46 years of age.  The patient   presented to clinic because of pruritus, itching and discharge.  History is   important that the patient was hospitalized because of lung problem, chronic   bronchitis, had multiple antibiotic treatment.  Presently on Depo-Estradiol and   testosterone.  She is post , supracervical hysterectomy, BSO,   appendectomy, multiple medical problems.  She was as stated in the hospital used   soaps and different kind of soap.  Also past history had yeast infection.    PHYSICAL EXAMINATION:  VITAL SIGNS:  Blood pressure 143/81 and weight 277.  ABDOMEN:  Soft.  PELVIC:  External, extensive vulvitis, intertriginous dermatitis.  Bartholin,   [place] and urethral glands are negative.  Vagina is clear.  Cervix is present,   clear.  No evidence of inflammation or odor.  Body of the uterus and adnexa is   absent.    PLAN:  We will do vaginal culture; however, my impression is that the patient   has an extensive vulvitis secondary to contact either soap or some other   allergens.  We will give the patient Lotrisone ointment to use twice a day for   the next two weeks.  If the culture shows up anything, we will treat it   accordingly.      MAHAMED  dd: 2017 15:23:38 (CDT)  td: 2017 11:03:52 (CDT)  Doc ID   #[id number]  Job ID #[id number]    CC:           Assessment:      Diagnosis: DISCHARGE   VULVITIS       Plan:      Return in 6  months

## 2017-06-05 ENCOUNTER — TELEPHONE (OUTPATIENT)
Dept: OBSTETRICS AND GYNECOLOGY | Facility: CLINIC | Age: 47
End: 2017-06-05

## 2017-06-05 NOTE — TELEPHONE ENCOUNTER
Notes Recorded by Kamar Jackson LPN on 6/5/2017 at 1:38 PM CDT  SPOKE WITH MS WEBER, INFORMED HER THAT DR NAJERA , REVIEWED HER RECENT VAGINOSIS SCREENING AND IT IS POSITIVE FOR BACTERIAL VAGINOSIS , HE SENT D[facility]LUCAN TO [facility] PH[facility] FOR HER , PT STATED HER UNDERSTANDING

## 2017-06-29 ENCOUNTER — CLINICAL SUPPORT (OUTPATIENT)
Dept: OBSTETRICS AND GYNECOLOGY | Facility: CLINIC | Age: 47
End: 2017-06-29
Payer: MEDICAID

## 2017-06-29 VITALS
HEIGHT: 62 IN | SYSTOLIC BLOOD PRESSURE: 140 MMHG | WEIGHT: 275.13 LBS | DIASTOLIC BLOOD PRESSURE: 86 MMHG | BODY MASS INDEX: 50.63 KG/M2

## 2017-06-29 DIAGNOSIS — Z79.890 HORMONE REPLACEMENT THERAPY (HRT): Primary | ICD-10-CM

## 2017-06-29 PROCEDURE — 99999 PR PBB SHADOW E&M-EST. PATIENT-LVL II: CPT | Mod: PBBFAC,,,

## 2017-06-29 PROCEDURE — 99212 OFFICE O/P EST SF 10 MIN: CPT | Mod: PBBFAC

## 2017-06-29 PROCEDURE — 96372 THER/PROPH/DIAG INJ SC/IM: CPT | Mod: PBBFAC

## 2017-06-29 RX ORDER — ESTRADIOL VALERATE 40 MG/ML
20 INJECTION INTRAMUSCULAR
Status: COMPLETED | OUTPATIENT
Start: 2017-06-29 | End: 2017-06-29

## 2017-06-29 RX ADMIN — TESTOSTERONE CYPIONATE 50 MG: 200 INJECTION, SOLUTION INTRAMUSCULAR at 01:06

## 2017-06-29 RX ADMIN — ESTRADIOL VALERATE 20 MG: 40 INJECTION INTRAMUSCULAR at 01:06

## 2017-07-24 ENCOUNTER — TELEPHONE (OUTPATIENT)
Dept: OBSTETRICS AND GYNECOLOGY | Facility: CLINIC | Age: 47
End: 2017-07-24

## 2017-07-24 NOTE — TELEPHONE ENCOUNTER
----- Message from Rosey Samano sent at 7/24/2017 11:02 AM CDT -----  Patient is needing mammogram orders. Please call at [phone] Thank you!

## 2017-07-27 ENCOUNTER — HOSPITAL ENCOUNTER (OUTPATIENT)
Dept: RADIOLOGY | Facility: HOSPITAL | Age: 47
Discharge: HOME | End: 2017-07-27
Attending: OBSTETRICS & GYNECOLOGY
Payer: MEDICAID

## 2017-07-27 ENCOUNTER — CLINICAL SUPPORT (OUTPATIENT)
Dept: OBSTETRICS AND GYNECOLOGY | Facility: CLINIC | Age: 47
End: 2017-07-27
Payer: MEDICAID

## 2017-07-27 ENCOUNTER — OFFICE VISIT (OUTPATIENT)
Dept: OBSTETRICS AND GYNECOLOGY | Facility: CLINIC | Age: 47
End: 2017-07-27
Payer: MEDICAID

## 2017-07-27 VITALS
HEIGHT: 62 IN | SYSTOLIC BLOOD PRESSURE: 140 MMHG | BODY MASS INDEX: 50.61 KG/M2 | HEIGHT: 62 IN | BODY MASS INDEX: 49.94 KG/M2 | WEIGHT: 275 LBS | WEIGHT: 271.38 LBS | DIASTOLIC BLOOD PRESSURE: 78 MMHG

## 2017-07-27 VITALS — WEIGHT: 272.5 LBS | DIASTOLIC BLOOD PRESSURE: 72 MMHG | BODY MASS INDEX: 49.84 KG/M2 | SYSTOLIC BLOOD PRESSURE: 136 MMHG

## 2017-07-27 DIAGNOSIS — N95.1 MENOPAUSAL STATE: ICD-10-CM

## 2017-07-27 DIAGNOSIS — Z01.419 ENCOUNTER FOR GYNECOLOGICAL EXAMINATION WITHOUT ABNORMAL FINDING: ICD-10-CM

## 2017-07-27 DIAGNOSIS — Z12.31 VISIT FOR SCREENING MAMMOGRAM: ICD-10-CM

## 2017-07-27 DIAGNOSIS — Z79.890 HORMONE REPLACEMENT THERAPY (HRT): ICD-10-CM

## 2017-07-27 DIAGNOSIS — Z79.890 POST-MENOPAUSE ON HRT (HORMONE REPLACEMENT THERAPY): Primary | ICD-10-CM

## 2017-07-27 DIAGNOSIS — Z90.710 STATUS POST HYSTERECTOMY: ICD-10-CM

## 2017-07-27 DIAGNOSIS — Z00.00 ANNUAL PHYSICAL EXAM: Primary | ICD-10-CM

## 2017-07-27 PROCEDURE — 77063 BREAST TOMOSYNTHESIS BI: CPT | Mod: 26,,, | Performed by: RADIOLOGY

## 2017-07-27 PROCEDURE — 99396 PREV VISIT EST AGE 40-64: CPT | Mod: S$PBB,,, | Performed by: OBSTETRICS & GYNECOLOGY

## 2017-07-27 PROCEDURE — 99999 PR PBB SHADOW E&M-EST. PATIENT-LVL III: CPT | Mod: PBBFAC,,,

## 2017-07-27 PROCEDURE — 77067 SCR MAMMO BI INCL CAD: CPT | Mod: 26,,, | Performed by: RADIOLOGY

## 2017-07-27 PROCEDURE — 96372 THER/PROPH/DIAG INJ SC/IM: CPT | Mod: PBBFAC

## 2017-07-27 PROCEDURE — 99213 OFFICE O/P EST LOW 20 MIN: CPT | Mod: PBBFAC,27

## 2017-07-27 PROCEDURE — 99999 PR PBB SHADOW E&M-EST. PATIENT-LVL III: CPT | Mod: PBBFAC,,, | Performed by: OBSTETRICS & GYNECOLOGY

## 2017-07-27 RX ORDER — POTASSIUM CHLORIDE 20 MEQ/1
20 TABLET, EXTENDED RELEASE ORAL DAILY
Refills: 0 | COMMUNITY
Start: 2017-06-01 | End: 2017-07-27

## 2017-07-27 RX ORDER — ESTRADIOL VALERATE 20 MG/ML
20 INJECTION INTRAMUSCULAR
Status: COMPLETED | OUTPATIENT
Start: 2017-07-28 | End: 2017-07-27

## 2017-07-27 RX ORDER — UMECLIDINIUM 62.5 UG/1
AEROSOL, POWDER ORAL
COMMUNITY
Start: 2017-05-30 | End: 2018-03-15

## 2017-07-27 RX ORDER — PREDNISONE 10 MG/1
TABLET ORAL
COMMUNITY
Start: 2017-05-30 | End: 2017-07-27

## 2017-07-27 RX ORDER — OMEPRAZOLE 40 MG/1
40 CAPSULE, DELAYED RELEASE ORAL DAILY
Refills: 5 | COMMUNITY
Start: 2017-07-12

## 2017-07-27 RX ADMIN — ESTRADIOL VALERATE 20 MG: 20 INJECTION INTRAMUSCULAR at 02:07

## 2017-07-27 RX ADMIN — TESTOSTERONE CYPIONATE 50 MG: 200 INJECTION, SOLUTION INTRAMUSCULAR at 02:07

## 2017-07-27 NOTE — PROGRESS NOTES
Subjective:      Chief Complaint:    Chief Complaint   Patient presents with    Gynecologic Exam       Menstrual History:    OB History      Para Term  AB Living    4 2 2     2    SAB TAB Ectopic Multiple Live Births                       Menarche age: 11     Patient's last menstrual period was 10/23/2013.           Objective:        History of Present Illness AND  Examination detailed DICTATE:       PRESENT ILLNESS AND PHYSICAL EXAMINATION NOTE    The patient is 46 years of age.  She is a  2, para 2, here for annual   examination.  Pap smear in 2015 was negative.  Mammogram, yesterday at [facility];   however, the report is not back yet.  The patient has been on Depo-Estradiol and   testosterone.    PAST MEDICAL HISTORY:  Multiple medical problems reviewed.    PAST SURGICAL HISTORY:  Hysterectomy, BSO, appendectomy, .    REVIEW OF SYSTEMS:  HEAD, EAR, EYES, NOSE, AND THROAT:  Negative.  CARDIORESPIRATORY:  Negative.  GASTROINTESTINAL:  Negative.  GENITOURINARY:  See present illness.  NEUROMUSCULAR:  No problem or complaint.    PHYSICAL EXAMINATION:  GENERAL:  Well-developed, well-nourished, alert, oriented female, not in acute   distress.  VITAL SIGNS:  Blood pressure 147/80, weight 271 pounds.  HEAD:  Normocephalic.  EYES:  React.  NECK:  Supple.  Thyroid is not palpable.  No nodes.  CHEST:  Clear.  HEART:  Regular sinus rhythm.  No murmur.  BREASTS:  No lumps, masses, discharge, skin changes, retraction, nipple changes.    Axillae negative.  ABDOMEN:  Upper abdomen normal.  Lower abdomen normal.  No guarding or rebound.  PELVIC:  External normal.  Vulva normal.  Bartholin, urethral and [place] glands   are negative.  Vagina is clear.  Cervix clear.  Good pelvic support, apical   support.  No pain.  RECTAL:  External negative.  MUSCULOSKELETAL:  Negative.  NEUROLOGIC:  Grossly normal.    PLAN:  We will continue with Depo-Estradiol and testosterone.    RECOMMENDATION:  Multivitamin,  calcium D.  Increase activity.  Restriction of   diet.  The patient has problem with her neck and lumbar areas with spinal   stenosis, so apparently exercise is not quite feasible.      CHRISSIE/ZAIRE  dd: 07/27/2017 13:53:41 (CDT)  td: 07/27/2017 21:29:36 (CDT)  Doc ID   #[id number]  Job ID #[id number]    CC:           Assessment:      Diagnosis: ANNUAL   EXAM      GYN   EXAM     Plan:      Return in 12  months

## 2017-07-27 NOTE — PROGRESS NOTES
hormonal replacement therapy (delestrogen & depotestosterone) ,injection given via r gluteal information given to pt about menopause & hrt medications, next appt on 08/24/17 @ 2362P

## 2017-08-24 ENCOUNTER — CLINICAL SUPPORT (OUTPATIENT)
Dept: OBSTETRICS AND GYNECOLOGY | Facility: CLINIC | Age: 47
End: 2017-08-24
Payer: MEDICAID

## 2017-08-24 VITALS — WEIGHT: 260 LBS | BODY MASS INDEX: 47.55 KG/M2 | SYSTOLIC BLOOD PRESSURE: 138 MMHG | DIASTOLIC BLOOD PRESSURE: 76 MMHG

## 2017-08-24 DIAGNOSIS — Z79.890 POST-MENOPAUSE ON HRT (HORMONE REPLACEMENT THERAPY): Primary | ICD-10-CM

## 2017-08-24 PROCEDURE — 99999 PR PBB SHADOW E&M-EST. PATIENT-LVL III: CPT | Mod: PBBFAC,,,

## 2017-08-24 PROCEDURE — 99213 OFFICE O/P EST LOW 20 MIN: CPT | Mod: PBBFAC

## 2017-08-24 PROCEDURE — 96372 THER/PROPH/DIAG INJ SC/IM: CPT | Mod: PBBFAC

## 2017-08-24 RX ORDER — FLUTICASONE FUROATE AND VILANTEROL 100; 25 UG/1; UG/1
1 POWDER RESPIRATORY (INHALATION) DAILY
COMMUNITY
End: 2019-05-22

## 2017-08-24 RX ORDER — ESTRADIOL VALERATE 20 MG/ML
20 INJECTION INTRAMUSCULAR
Status: COMPLETED | OUTPATIENT
Start: 2017-08-24 | End: 2017-08-24

## 2017-08-24 RX ADMIN — TESTOSTERONE CYPIONATE 50 MG: 200 INJECTION, SOLUTION INTRAMUSCULAR at 02:08

## 2017-08-24 RX ADMIN — ESTRADIOL VALERATE 20 MG: 20 INJECTION INTRAMUSCULAR at 02:08

## 2017-09-14 DIAGNOSIS — N76.1 CHRONIC VAGINITIS: ICD-10-CM

## 2017-09-14 RX ORDER — TERCONAZOLE 8 MG/G
CREAM VAGINAL
Qty: 20 G | Refills: 1 | Status: SHIPPED | OUTPATIENT
Start: 2017-09-14 | End: 2018-04-27 | Stop reason: SDUPTHER

## 2017-09-21 RX ADMIN — TESTOSTERONE CYPIONATE 50 MG: 200 INJECTION, SOLUTION INTRAMUSCULAR at 09:09

## 2017-09-27 ENCOUNTER — CLINICAL SUPPORT (OUTPATIENT)
Dept: OBSTETRICS AND GYNECOLOGY | Facility: CLINIC | Age: 47
End: 2017-09-27
Payer: MEDICAID

## 2017-09-27 VITALS
BODY MASS INDEX: 50.32 KG/M2 | WEIGHT: 275.13 LBS | SYSTOLIC BLOOD PRESSURE: 132 MMHG | DIASTOLIC BLOOD PRESSURE: 82 MMHG

## 2017-09-27 DIAGNOSIS — Z79.890 POST-MENOPAUSE ON HRT (HORMONE REPLACEMENT THERAPY): Primary | ICD-10-CM

## 2017-09-27 PROCEDURE — 96372 THER/PROPH/DIAG INJ SC/IM: CPT | Mod: PBBFAC

## 2017-09-27 PROCEDURE — 99999 PR PBB SHADOW E&M-EST. PATIENT-LVL III: CPT | Mod: PBBFAC,,,

## 2017-09-27 PROCEDURE — 99213 OFFICE O/P EST LOW 20 MIN: CPT | Mod: PBBFAC

## 2017-09-27 RX ORDER — MELOXICAM 15 MG/1
15 TABLET ORAL DAILY
COMMUNITY
End: 2020-01-10 | Stop reason: SDUPTHER

## 2017-09-27 RX ORDER — ESTRADIOL VALERATE 20 MG/ML
20 INJECTION INTRAMUSCULAR
Status: COMPLETED | OUTPATIENT
Start: 2017-09-27 | End: 2017-09-27

## 2017-09-27 RX ADMIN — ESTRADIOL VALERATE 20 MG: 20 INJECTION INTRAMUSCULAR at 01:09

## 2017-09-27 NOTE — PROGRESS NOTES
hormonal replacement therapy (delestrogen & depotestosterone) , information given to pt about menopause & hrt medications, INJECTION GIVEN VIA R GLUTEAL,  next appt on  10/26/17 @ 1300

## 2017-10-26 ENCOUNTER — CLINICAL SUPPORT (OUTPATIENT)
Dept: OBSTETRICS AND GYNECOLOGY | Facility: CLINIC | Age: 47
End: 2017-10-26
Payer: MEDICAID

## 2017-10-26 VITALS — BODY MASS INDEX: 50.08 KG/M2 | WEIGHT: 273.81 LBS

## 2017-10-26 DIAGNOSIS — Z79.890 POST-MENOPAUSE ON HRT (HORMONE REPLACEMENT THERAPY): Primary | ICD-10-CM

## 2017-10-26 PROCEDURE — 99999 PR PBB SHADOW E&M-EST. PATIENT-LVL III: CPT | Mod: PBBFAC,,,

## 2017-10-26 PROCEDURE — 96372 THER/PROPH/DIAG INJ SC/IM: CPT | Mod: PBBFAC

## 2017-10-26 PROCEDURE — 99213 OFFICE O/P EST LOW 20 MIN: CPT | Mod: PBBFAC,25

## 2017-10-26 RX ORDER — ESTRADIOL VALERATE 20 MG/ML
20 INJECTION INTRAMUSCULAR
Status: DISCONTINUED | OUTPATIENT
Start: 2017-10-26 | End: 2018-01-17 | Stop reason: CLARIF

## 2017-10-26 RX ADMIN — ESTRADIOL VALERATE 20 MG: 20 INJECTION INTRAMUSCULAR at 01:10

## 2017-10-26 RX ADMIN — TESTOSTERONE CYPIONATE 50 MG: 200 INJECTION, SOLUTION INTRAMUSCULAR at 01:10

## 2017-10-26 NOTE — PROGRESS NOTES
hormonal replacement therapy (delestrogen & depotestosterone) , information given to pt about menopause & hrt medications, next appt on 11/27/17 @ 1:30p

## 2017-11-27 ENCOUNTER — CLINICAL SUPPORT (OUTPATIENT)
Dept: OBSTETRICS AND GYNECOLOGY | Facility: CLINIC | Age: 47
End: 2017-11-27
Payer: MEDICAID

## 2017-11-27 VITALS
BODY MASS INDEX: 49.19 KG/M2 | WEIGHT: 268.94 LBS | DIASTOLIC BLOOD PRESSURE: 74 MMHG | SYSTOLIC BLOOD PRESSURE: 136 MMHG

## 2017-11-27 DIAGNOSIS — Z79.890 POST-MENOPAUSE ON HRT (HORMONE REPLACEMENT THERAPY): Primary | ICD-10-CM

## 2017-11-27 PROCEDURE — 99999 PR PBB SHADOW E&M-EST. PATIENT-LVL III: CPT | Mod: PBBFAC,,,

## 2017-11-27 PROCEDURE — 96372 THER/PROPH/DIAG INJ SC/IM: CPT | Mod: PBBFAC

## 2017-11-27 PROCEDURE — 99213 OFFICE O/P EST LOW 20 MIN: CPT | Mod: PBBFAC

## 2017-11-27 RX ORDER — TESTOSTERONE CYPIONATE 200 MG/ML
50 INJECTION, SOLUTION INTRAMUSCULAR
Status: COMPLETED | OUTPATIENT
Start: 2017-11-27 | End: 2018-04-19

## 2017-11-27 RX ADMIN — TESTOSTERONE CYPIONATE 50 MG: 200 INJECTION, SOLUTION INTRAMUSCULAR at 01:11

## 2017-11-27 RX ADMIN — ESTRADIOL VALERATE 20 MG: 20 INJECTION INTRAMUSCULAR at 01:11

## 2017-11-27 NOTE — PROGRESS NOTES
hormonal replacement therapy (delestrogen & depotestosterone) , information given to pt about menopause & hrt medications,injection given via L gluteal Without incident.

## 2017-12-18 ENCOUNTER — CLINICAL SUPPORT (OUTPATIENT)
Dept: OBSTETRICS AND GYNECOLOGY | Facility: CLINIC | Age: 47
End: 2017-12-18
Payer: MEDICAID

## 2017-12-18 VITALS
DIASTOLIC BLOOD PRESSURE: 74 MMHG | WEIGHT: 268.94 LBS | BODY MASS INDEX: 49.19 KG/M2 | SYSTOLIC BLOOD PRESSURE: 128 MMHG

## 2017-12-18 DIAGNOSIS — Z79.890 POST-MENOPAUSE ON HRT (HORMONE REPLACEMENT THERAPY): Primary | ICD-10-CM

## 2017-12-18 PROCEDURE — 96372 THER/PROPH/DIAG INJ SC/IM: CPT | Mod: PBBFAC

## 2017-12-18 PROCEDURE — 99999 PR PBB SHADOW E&M-EST. PATIENT-LVL III: CPT | Mod: PBBFAC,,,

## 2017-12-18 PROCEDURE — 99213 OFFICE O/P EST LOW 20 MIN: CPT | Mod: PBBFAC

## 2017-12-18 RX ADMIN — ESTRADIOL VALERATE 20 MG: 20 INJECTION INTRAMUSCULAR at 01:12

## 2017-12-18 RX ADMIN — TESTOSTERONE CYPIONATE 50 MG: 200 INJECTION, SOLUTION INTRAMUSCULAR at 01:12

## 2017-12-18 NOTE — PROGRESS NOTES
hormonal replacement therapy (delestrogen & depotestosterone) , information given to pt about menopause & hrt medications,injection given via  r gluteal  Without incident.

## 2018-01-17 ENCOUNTER — CLINICAL SUPPORT (OUTPATIENT)
Dept: OBSTETRICS AND GYNECOLOGY | Facility: CLINIC | Age: 48
End: 2018-01-17
Payer: MEDICAID

## 2018-01-17 VITALS
SYSTOLIC BLOOD PRESSURE: 134 MMHG | WEIGHT: 268.94 LBS | BODY MASS INDEX: 49.19 KG/M2 | DIASTOLIC BLOOD PRESSURE: 76 MMHG

## 2018-01-17 DIAGNOSIS — Z79.890 POST-MENOPAUSE ON HRT (HORMONE REPLACEMENT THERAPY): Primary | ICD-10-CM

## 2018-01-17 PROCEDURE — 99999 PR PBB SHADOW E&M-EST. PATIENT-LVL III: CPT | Mod: PBBFAC,,,

## 2018-01-17 PROCEDURE — 96372 THER/PROPH/DIAG INJ SC/IM: CPT | Mod: PBBFAC

## 2018-01-17 PROCEDURE — 99213 OFFICE O/P EST LOW 20 MIN: CPT | Mod: PBBFAC,25

## 2018-01-17 RX ORDER — ESTRADIOL VALERATE 40 MG/ML
40 INJECTION INTRAMUSCULAR
Status: DISCONTINUED | OUTPATIENT
Start: 2018-01-17 | End: 2018-01-19 | Stop reason: CLARIF

## 2018-01-17 RX ADMIN — TESTOSTERONE CYPIONATE 50 MG: 200 INJECTION, SOLUTION INTRAMUSCULAR at 02:01

## 2018-01-17 RX ADMIN — ESTRADIOL VALERATE 20 MG: 40 INJECTION, SOLUTION INTRAMUSCULAR at 02:01

## 2018-01-19 RX ORDER — ESTRADIOL VALERATE 40 MG/ML
20 INJECTION INTRAMUSCULAR
Status: COMPLETED | OUTPATIENT
Start: 2018-01-20 | End: 2018-07-18

## 2018-02-15 ENCOUNTER — CLINICAL SUPPORT (OUTPATIENT)
Dept: OBSTETRICS AND GYNECOLOGY | Facility: CLINIC | Age: 48
End: 2018-02-15
Payer: MEDICAID

## 2018-02-15 VITALS — SYSTOLIC BLOOD PRESSURE: 128 MMHG | DIASTOLIC BLOOD PRESSURE: 62 MMHG | WEIGHT: 271 LBS | BODY MASS INDEX: 49.57 KG/M2

## 2018-02-15 DIAGNOSIS — Z79.890 POST-MENOPAUSE ON HRT (HORMONE REPLACEMENT THERAPY): Primary | ICD-10-CM

## 2018-02-15 PROCEDURE — 99999 PR PBB SHADOW E&M-EST. PATIENT-LVL III: CPT | Mod: PBBFAC,,,

## 2018-02-15 PROCEDURE — 96372 THER/PROPH/DIAG INJ SC/IM: CPT | Mod: PBBFAC

## 2018-02-15 PROCEDURE — 99213 OFFICE O/P EST LOW 20 MIN: CPT | Mod: PBBFAC,25

## 2018-02-15 RX ADMIN — ESTRADIOL VALERATE 20 MG: 40 INJECTION INTRAMUSCULAR at 01:02

## 2018-02-15 RX ADMIN — TESTOSTERONE CYPIONATE 50 MG: 200 INJECTION, SOLUTION INTRAMUSCULAR at 01:02

## 2018-03-15 ENCOUNTER — CLINICAL SUPPORT (OUTPATIENT)
Dept: OBSTETRICS AND GYNECOLOGY | Facility: CLINIC | Age: 48
End: 2018-03-15
Payer: MEDICAID

## 2018-03-15 VITALS
BODY MASS INDEX: 48.79 KG/M2 | SYSTOLIC BLOOD PRESSURE: 132 MMHG | DIASTOLIC BLOOD PRESSURE: 76 MMHG | WEIGHT: 266.75 LBS

## 2018-03-15 DIAGNOSIS — Z79.890 POST-MENOPAUSE ON HRT (HORMONE REPLACEMENT THERAPY): Primary | ICD-10-CM

## 2018-03-15 PROCEDURE — 99999 PR PBB SHADOW E&M-EST. PATIENT-LVL III: CPT | Mod: PBBFAC,,,

## 2018-03-15 PROCEDURE — 96372 THER/PROPH/DIAG INJ SC/IM: CPT | Mod: PBBFAC

## 2018-03-15 PROCEDURE — 99213 OFFICE O/P EST LOW 20 MIN: CPT | Mod: PBBFAC,25

## 2018-03-15 RX ORDER — ERGOCALCIFEROL 1.25 MG/1
50000 CAPSULE ORAL
COMMUNITY
End: 2019-05-22

## 2018-03-15 RX ORDER — SERTRALINE HYDROCHLORIDE 25 MG/1
100 TABLET, FILM COATED ORAL DAILY
COMMUNITY

## 2018-03-15 RX ORDER — HYDROCHLOROTHIAZIDE 12.5 MG/1
12.5 TABLET ORAL DAILY
COMMUNITY

## 2018-03-15 RX ADMIN — TESTOSTERONE CYPIONATE 50 MG: 200 INJECTION, SOLUTION INTRAMUSCULAR at 01:03

## 2018-03-15 RX ADMIN — ESTRADIOL VALERATE 20 MG: 40 INJECTION INTRAMUSCULAR at 01:03

## 2018-04-19 ENCOUNTER — CLINICAL SUPPORT (OUTPATIENT)
Dept: OBSTETRICS AND GYNECOLOGY | Facility: CLINIC | Age: 48
End: 2018-04-19
Payer: MEDICAID

## 2018-04-19 VITALS — BODY MASS INDEX: 48.79 KG/M2 | WEIGHT: 266.75 LBS

## 2018-04-19 DIAGNOSIS — Z79.890 POST-MENOPAUSE ON HRT (HORMONE REPLACEMENT THERAPY): Primary | ICD-10-CM

## 2018-04-19 PROCEDURE — 99213 OFFICE O/P EST LOW 20 MIN: CPT | Mod: PBBFAC,25

## 2018-04-19 PROCEDURE — 99999 PR PBB SHADOW E&M-EST. PATIENT-LVL III: CPT | Mod: PBBFAC,,,

## 2018-04-19 PROCEDURE — 96372 THER/PROPH/DIAG INJ SC/IM: CPT | Mod: PBBFAC

## 2018-04-19 RX ADMIN — TESTOSTERONE CYPIONATE 50 MG: 200 INJECTION, SOLUTION INTRAMUSCULAR at 01:04

## 2018-04-19 RX ADMIN — ESTRADIOL VALERATE 20 MG: 40 INJECTION INTRAMUSCULAR at 01:04

## 2018-04-27 DIAGNOSIS — N76.1 CHRONIC VAGINITIS: ICD-10-CM

## 2018-04-27 NOTE — TELEPHONE ENCOUNTER
----- Message from Anamika Roca sent at 4/27/2018 12:18 PM CDT -----  Contact: self  Patient called stating that she used a different body wash and she is now burning and itching in vaginal area. Please contact her at [phone].    Thanks!    Patient is requesting something for yeast infection Dr Delgado is out of thew office. Patient has been advised to use OTC monistat or terazol and if symptoms continue she can call for appt on Monday.sal

## 2018-04-30 RX ORDER — TERCONAZOLE 8 MG/G
CREAM VAGINAL
Qty: 20 G | Refills: 1 | Status: SHIPPED | OUTPATIENT
Start: 2018-04-30 | End: 2022-08-04 | Stop reason: SDUPTHER

## 2018-05-16 ENCOUNTER — TELEPHONE (OUTPATIENT)
Dept: OBSTETRICS AND GYNECOLOGY | Facility: CLINIC | Age: 48
End: 2018-05-16

## 2018-05-16 NOTE — TELEPHONE ENCOUNTER
----- Message from Darryl Andrews sent at 5/16/2018  1:13 PM CDT -----  Contact: Self  Pt wants to know if she can come in today for hormone injection. Pt states she's only a few mins away from clinic. Pt can be reached @ [phone].   ----------------------------------------------------------------  5/16/18 @ 9286 ([facility])  SPOKE WITH MS WEBER. PT REQUESTING SOONER APPT FOR HER HRT, APPT MADE FOR 5/17/17 @ 0185

## 2018-05-17 ENCOUNTER — CLINICAL SUPPORT (OUTPATIENT)
Dept: OBSTETRICS AND GYNECOLOGY | Facility: CLINIC | Age: 48
End: 2018-05-17
Payer: MEDICAID

## 2018-05-17 VITALS — WEIGHT: 272 LBS | BODY MASS INDEX: 49.75 KG/M2

## 2018-05-17 DIAGNOSIS — Z79.890 POST-MENOPAUSE ON HRT (HORMONE REPLACEMENT THERAPY): Primary | ICD-10-CM

## 2018-05-17 PROCEDURE — 99213 OFFICE O/P EST LOW 20 MIN: CPT | Mod: PBBFAC,25

## 2018-05-17 PROCEDURE — 99999 PR PBB SHADOW E&M-EST. PATIENT-LVL III: CPT | Mod: PBBFAC,,,

## 2018-05-17 PROCEDURE — 96372 THER/PROPH/DIAG INJ SC/IM: CPT | Mod: PBBFAC

## 2018-05-17 RX ORDER — TESTOSTERONE CYPIONATE 200 MG/ML
50 INJECTION, SOLUTION INTRAMUSCULAR
Status: COMPLETED | OUTPATIENT
Start: 2018-05-17 | End: 2018-10-15

## 2018-05-17 RX ADMIN — TESTOSTERONE CYPIONATE 50 MG: 200 INJECTION, SOLUTION INTRAMUSCULAR at 01:05

## 2018-05-17 RX ADMIN — ESTRADIOL VALERATE 20 MG: 40 INJECTION INTRAMUSCULAR at 01:05

## 2018-06-20 ENCOUNTER — CLINICAL SUPPORT (OUTPATIENT)
Dept: OBSTETRICS AND GYNECOLOGY | Facility: CLINIC | Age: 48
End: 2018-06-20
Payer: MEDICAID

## 2018-06-20 VITALS — SYSTOLIC BLOOD PRESSURE: 134 MMHG | BODY MASS INDEX: 49.6 KG/M2 | DIASTOLIC BLOOD PRESSURE: 66 MMHG | WEIGHT: 271.19 LBS

## 2018-06-20 DIAGNOSIS — Z79.890 POST-MENOPAUSE ON HRT (HORMONE REPLACEMENT THERAPY): Primary | ICD-10-CM

## 2018-06-20 PROCEDURE — 99213 OFFICE O/P EST LOW 20 MIN: CPT | Mod: PBBFAC

## 2018-06-20 PROCEDURE — 96372 THER/PROPH/DIAG INJ SC/IM: CPT | Mod: PBBFAC

## 2018-06-20 PROCEDURE — 99999 PR PBB SHADOW E&M-EST. PATIENT-LVL III: CPT | Mod: PBBFAC,,,

## 2018-06-20 RX ADMIN — ESTRADIOL VALERATE 20 MG: 40 INJECTION INTRAMUSCULAR at 01:06

## 2018-06-20 RX ADMIN — TESTOSTERONE CYPIONATE 50 MG: 200 INJECTION, SOLUTION INTRAMUSCULAR at 01:06

## 2018-07-18 ENCOUNTER — CLINICAL SUPPORT (OUTPATIENT)
Dept: OBSTETRICS AND GYNECOLOGY | Facility: CLINIC | Age: 48
End: 2018-07-18
Payer: MEDICAID

## 2018-07-18 VITALS — DIASTOLIC BLOOD PRESSURE: 78 MMHG | BODY MASS INDEX: 50.4 KG/M2 | WEIGHT: 275.56 LBS | SYSTOLIC BLOOD PRESSURE: 136 MMHG

## 2018-07-18 DIAGNOSIS — Z79.890 POST-MENOPAUSE ON HRT (HORMONE REPLACEMENT THERAPY): Primary | ICD-10-CM

## 2018-07-18 PROCEDURE — 99999 PR PBB SHADOW E&M-EST. PATIENT-LVL III: CPT | Mod: PBBFAC,,,

## 2018-07-18 PROCEDURE — 99213 OFFICE O/P EST LOW 20 MIN: CPT | Mod: PBBFAC

## 2018-07-18 PROCEDURE — 96372 THER/PROPH/DIAG INJ SC/IM: CPT | Mod: PBBFAC

## 2018-07-18 RX ADMIN — ESTRADIOL VALERATE 20 MG: 40 INJECTION INTRAMUSCULAR at 01:07

## 2018-07-18 RX ADMIN — TESTOSTERONE CYPIONATE 50 MG: 200 INJECTION, SOLUTION INTRAMUSCULAR at 01:07

## 2018-08-16 ENCOUNTER — CLINICAL SUPPORT (OUTPATIENT)
Dept: OBSTETRICS AND GYNECOLOGY | Facility: CLINIC | Age: 48
End: 2018-08-16
Payer: MEDICAID

## 2018-08-16 ENCOUNTER — HOSPITAL ENCOUNTER (OUTPATIENT)
Dept: RADIOLOGY | Facility: HOSPITAL | Age: 48
Discharge: HOME | End: 2018-08-16
Attending: OBSTETRICS & GYNECOLOGY
Payer: MEDICAID

## 2018-08-16 ENCOUNTER — OFFICE VISIT (OUTPATIENT)
Dept: OBSTETRICS AND GYNECOLOGY | Facility: CLINIC | Age: 48
End: 2018-08-16
Payer: MEDICAID

## 2018-08-16 VITALS — SYSTOLIC BLOOD PRESSURE: 140 MMHG | WEIGHT: 278 LBS | BODY MASS INDEX: 50.85 KG/M2 | DIASTOLIC BLOOD PRESSURE: 76 MMHG

## 2018-08-16 VITALS
HEIGHT: 62 IN | BODY MASS INDEX: 51.2 KG/M2 | WEIGHT: 278.25 LBS | SYSTOLIC BLOOD PRESSURE: 140 MMHG | DIASTOLIC BLOOD PRESSURE: 76 MMHG

## 2018-08-16 DIAGNOSIS — Z79.890 HORMONE REPLACEMENT THERAPY (HRT): ICD-10-CM

## 2018-08-16 DIAGNOSIS — Z01.419 ENCOUNTER FOR GYNECOLOGICAL EXAMINATION WITHOUT ABNORMAL FINDING: ICD-10-CM

## 2018-08-16 DIAGNOSIS — N95.1 MENOPAUSAL STATE: ICD-10-CM

## 2018-08-16 DIAGNOSIS — Z12.31 ENCOUNTER FOR SCREENING MAMMOGRAM FOR BREAST CANCER: ICD-10-CM

## 2018-08-16 DIAGNOSIS — Z90.710 STATUS POST HYSTERECTOMY: ICD-10-CM

## 2018-08-16 DIAGNOSIS — N89.8 VAGINAL ODOR: ICD-10-CM

## 2018-08-16 DIAGNOSIS — Z79.890 HORMONE REPLACEMENT THERAPY (HRT): Primary | ICD-10-CM

## 2018-08-16 DIAGNOSIS — Z00.00 ANNUAL PHYSICAL EXAM: Primary | ICD-10-CM

## 2018-08-16 DIAGNOSIS — R92.30 DENSE BREAST TISSUE ON MAMMOGRAM: ICD-10-CM

## 2018-08-16 PROCEDURE — 99214 OFFICE O/P EST MOD 30 MIN: CPT | Mod: PBBFAC,25 | Performed by: OBSTETRICS & GYNECOLOGY

## 2018-08-16 PROCEDURE — 88175 CYTOPATH C/V AUTO FLUID REDO: CPT

## 2018-08-16 PROCEDURE — 99999 PR PBB SHADOW E&M-EST. PATIENT-LVL IV: CPT | Mod: PBBFAC,,, | Performed by: OBSTETRICS & GYNECOLOGY

## 2018-08-16 PROCEDURE — 99213 OFFICE O/P EST LOW 20 MIN: CPT | Mod: PBBFAC,27

## 2018-08-16 PROCEDURE — 99999 PR PBB SHADOW E&M-EST. PATIENT-LVL III: CPT | Mod: PBBFAC,,,

## 2018-08-16 PROCEDURE — 87660 TRICHOMONAS VAGIN DIR PROBE: CPT

## 2018-08-16 PROCEDURE — 96372 THER/PROPH/DIAG INJ SC/IM: CPT | Mod: PBBFAC

## 2018-08-16 PROCEDURE — 77067 SCR MAMMO BI INCL CAD: CPT | Mod: 26,,, | Performed by: RADIOLOGY

## 2018-08-16 PROCEDURE — 77063 BREAST TOMOSYNTHESIS BI: CPT | Mod: 26,,, | Performed by: RADIOLOGY

## 2018-08-16 PROCEDURE — 77063 BREAST TOMOSYNTHESIS BI: CPT | Mod: TC

## 2018-08-16 PROCEDURE — 99396 PREV VISIT EST AGE 40-64: CPT | Mod: S$PBB,,, | Performed by: OBSTETRICS & GYNECOLOGY

## 2018-08-16 RX ORDER — AMLODIPINE BESYLATE 10 MG/1
10 TABLET ORAL DAILY
Refills: 2 | COMMUNITY
Start: 2018-08-01

## 2018-08-16 RX ORDER — ESTRADIOL VALERATE 40 MG/ML
20 INJECTION INTRAMUSCULAR
Status: COMPLETED | OUTPATIENT
Start: 2018-08-16 | End: 2018-08-16

## 2018-08-16 RX ADMIN — TESTOSTERONE CYPIONATE 50 MG: 200 INJECTION, SOLUTION INTRAMUSCULAR at 01:08

## 2018-08-16 RX ADMIN — ESTRADIOL VALERATE 20 MG: 40 INJECTION INTRAMUSCULAR at 01:08

## 2018-08-16 NOTE — PROGRESS NOTES
Pt seen in clinic today. Received HRT injection via right dorsal gluteal site. Tolerated injection without any difficulty. Educational material given. Next appt scheduled. CW

## 2018-08-16 NOTE — PATIENT INSTRUCTIONS

## 2018-08-16 NOTE — PROGRESS NOTES
Subjective:      Chief Complaint:    Chief Complaint   Patient presents with    Gynecologic Exam       Menstrual History:    OB History      Para Term  AB Living    4 2 2     2    SAB TAB Ectopic Multiple Live Births                       Menarche age: 11     Patient's last menstrual period was 10/23/2013.            Objective:        History of Present Illness AND  Examination detailed DICTATE:   HISTORY OF PRESENT ILLNESS:  The patient is 47 years of age, menopausal, here   for annual exam.  The patient is a  4, para 2.  Pap smear in ,   negative.  Mammogram last year.  Fibroglandular proliferation dense breast.    Also, in the last visit, the patient had yeast infection, vagina.  The patient   is on Depo-Estradiol and testosterone.    PAST MEDICAL HISTORY:  COPD, GERD syndrome, thyroid, joint pain, arthritis and   asthma.    PAST SURGICAL HISTORY:  Hysterectomy, BSO, appendectomy and .    REVIEW OF SYSTEMS:  HEAD, EAR, EYES, NOSE, AND THROAT:  Negative.  CARDIORESPIRATORY:  Negative.  GASTROINTESTINAL:  Negative.  GENITOURINARY:  See present illness.  NEUROMUSCULAR:  Negative.    PHYSICAL EXAMINATION:  GENERAL:  Well-developed, well-nourished, alert, oriented female, not in acute   distress.  VITAL SIGNS:  Blood pressure 140/76, weight 278.  HEAD:  Normocephalic.  EYES:  Reactive.  NECK:  Supple.  Thyroid is not palpable.  No nodes.  CHEST:  Clear.  HEART:  Regular sinus rhythm, no murmur.  BREASTS:  Dense breast.  No skin changes, retraction or nipple.  The patient   occasionally can express drainage  squeezing.  Axilla is negative.  ABDOMEN:  Upper abdomen normal.  Lower abdomen normal.  No guarding or rebound.    Bowel sounds normal.  PELVIC:  External normal.  Vulva normal.  Bartholin, urethral and [place] glands   are negative.  Vagina is clear.  Cervix clear.  Uterus, adnexa are absent.  Good   apical support.  No pain on exam.  RECTAL:  Negative.  MUSCULOSKELETAL:  Normal  range of motion.  SKIN:  Clear.  Blood vessels are palpable and equal.    IMPRESSION:  Post-menopausal, post-hysterectomy.  On hormone replacement   therapy.    PLAN:  We will do a diagnostic mammogram because of the dense breast.  Continue   with multivitamins, calcium, vitamin D, and also we will continue with the   estrogen and testosterone injections.    RECOMMENDATION:  Weight loss and increased activity.            /ls [id number] blank(s)        CHRISSIE/ZAIRE  dd: 08/16/2018 13:17:39 (CDT)  td: 08/16/2018 21:14:12 (CDT)  Doc ID   #[id number]  Job ID #[id number]    CC:               Assessment:      Diagnosis: annual   EXAM   GYN   EXAM     MENOP[AUSAL   VAG   ODOR       Plan:      Return in 12  months

## 2018-08-28 ENCOUNTER — TELEPHONE (OUTPATIENT)
Dept: OBSTETRICS AND GYNECOLOGY | Facility: CLINIC | Age: 48
End: 2018-08-28

## 2018-08-28 NOTE — TELEPHONE ENCOUNTER
----- Message from Carlos Delgado MD sent at 8/27/2018 12:40 PM CDT -----  Your MAMMOGRAM is normal

## 2018-08-28 NOTE — TELEPHONE ENCOUNTER
----- Message from Carlos Delgado MD sent at 8/27/2018 12:39 PM CDT -----  Your Pap smear is normal.  We will see you at your next visit.  If you have any questions, please do not hesitate to call.  Have a good day.

## 2018-09-13 ENCOUNTER — CLINICAL SUPPORT (OUTPATIENT)
Dept: OBSTETRICS AND GYNECOLOGY | Facility: CLINIC | Age: 48
End: 2018-09-13
Payer: MEDICAID

## 2018-09-13 VITALS
HEIGHT: 62 IN | BODY MASS INDEX: 51.08 KG/M2 | DIASTOLIC BLOOD PRESSURE: 76 MMHG | SYSTOLIC BLOOD PRESSURE: 138 MMHG | WEIGHT: 277.56 LBS

## 2018-09-13 DIAGNOSIS — Z79.890 HORMONE REPLACEMENT THERAPY (HRT): Primary | ICD-10-CM

## 2018-09-13 PROCEDURE — 99213 OFFICE O/P EST LOW 20 MIN: CPT | Mod: PBBFAC

## 2018-09-13 PROCEDURE — 99999 PR PBB SHADOW E&M-EST. PATIENT-LVL III: CPT | Mod: PBBFAC,,,

## 2018-09-13 PROCEDURE — 96372 THER/PROPH/DIAG INJ SC/IM: CPT | Mod: PBBFAC

## 2018-09-13 RX ORDER — ESTRADIOL VALERATE 40 MG/ML
20 INJECTION INTRAMUSCULAR
Status: COMPLETED | OUTPATIENT
Start: 2018-09-13 | End: 2019-01-30

## 2018-09-13 RX ADMIN — TESTOSTERONE CYPIONATE 50 MG: 200 INJECTION, SOLUTION INTRAMUSCULAR at 01:09

## 2018-09-13 RX ADMIN — ESTRADIOL VALERATE 20 MG: 40 INJECTION INTRAMUSCULAR at 01:09

## 2018-09-13 NOTE — PROGRESS NOTES
Pt seen in clinic today. Received HRT injection via left dorsal gluteal site. Tolerated injection without any difficulty. Next appt scheduled. CW

## 2018-10-15 ENCOUNTER — CLINICAL SUPPORT (OUTPATIENT)
Dept: OBSTETRICS AND GYNECOLOGY | Facility: CLINIC | Age: 48
End: 2018-10-15
Payer: MEDICAID

## 2018-10-15 VITALS
HEIGHT: 62 IN | DIASTOLIC BLOOD PRESSURE: 92 MMHG | SYSTOLIC BLOOD PRESSURE: 136 MMHG | BODY MASS INDEX: 51.11 KG/M2 | WEIGHT: 277.75 LBS

## 2018-10-15 DIAGNOSIS — Z79.890 HORMONE REPLACEMENT THERAPY (HRT): Primary | ICD-10-CM

## 2018-10-15 PROCEDURE — 99213 OFFICE O/P EST LOW 20 MIN: CPT | Mod: PBBFAC

## 2018-10-15 PROCEDURE — 96372 THER/PROPH/DIAG INJ SC/IM: CPT | Mod: PBBFAC

## 2018-10-15 PROCEDURE — 99999 PR PBB SHADOW E&M-EST. PATIENT-LVL III: CPT | Mod: PBBFAC,,,

## 2018-10-15 RX ADMIN — TESTOSTERONE CYPIONATE 50 MG: 200 INJECTION, SOLUTION INTRAMUSCULAR at 01:10

## 2018-10-15 RX ADMIN — ESTRADIOL VALERATE 20 MG: 40 INJECTION INTRAMUSCULAR at 01:10

## 2018-10-17 DIAGNOSIS — N76.3 CHRONIC VULVITIS: ICD-10-CM

## 2018-10-18 RX ORDER — CLOTRIMAZOLE AND BETAMETHASONE DIPROPIONATE 10; .64 MG/G; MG/G
CREAM TOPICAL
Qty: 15 G | Refills: 2 | Status: SHIPPED | OUTPATIENT
Start: 2018-10-18

## 2018-11-07 ENCOUNTER — CLINICAL SUPPORT (OUTPATIENT)
Dept: OBSTETRICS AND GYNECOLOGY | Facility: CLINIC | Age: 48
End: 2018-11-07
Payer: MEDICAID

## 2018-11-07 VITALS
WEIGHT: 271.19 LBS | BODY MASS INDEX: 49.6 KG/M2 | SYSTOLIC BLOOD PRESSURE: 138 MMHG | HEART RATE: 64 BPM | DIASTOLIC BLOOD PRESSURE: 72 MMHG

## 2018-11-07 DIAGNOSIS — Z79.890 POST-MENOPAUSE ON HRT (HORMONE REPLACEMENT THERAPY): Primary | ICD-10-CM

## 2018-11-07 PROCEDURE — 96372 THER/PROPH/DIAG INJ SC/IM: CPT | Mod: PBBFAC

## 2018-11-07 PROCEDURE — 99213 OFFICE O/P EST LOW 20 MIN: CPT | Mod: PBBFAC,25

## 2018-11-07 PROCEDURE — 99999 PR PBB SHADOW E&M-EST. PATIENT-LVL III: CPT | Mod: PBBFAC,,,

## 2018-11-07 RX ORDER — TESTOSTERONE CYPIONATE 200 MG/ML
50 INJECTION, SOLUTION INTRAMUSCULAR
Status: COMPLETED | OUTPATIENT
Start: 2018-11-07 | End: 2019-03-27

## 2018-11-07 RX ADMIN — TESTOSTERONE CYPIONATE 50 MG: 200 INJECTION, SOLUTION INTRAMUSCULAR at 01:11

## 2018-11-07 RX ADMIN — ESTRADIOL VALERATE 20 MG: 40 INJECTION INTRAMUSCULAR at 01:11

## 2018-12-04 ENCOUNTER — CLINICAL SUPPORT (OUTPATIENT)
Dept: OBSTETRICS AND GYNECOLOGY | Facility: CLINIC | Age: 48
End: 2018-12-04
Payer: MEDICAID

## 2018-12-04 VITALS — HEIGHT: 62 IN | WEIGHT: 268.06 LBS | BODY MASS INDEX: 49.33 KG/M2

## 2018-12-04 DIAGNOSIS — Z79.890 HORMONE REPLACEMENT THERAPY (HRT): Primary | ICD-10-CM

## 2018-12-04 PROCEDURE — 99999 PR PBB SHADOW E&M-EST. PATIENT-LVL I: CPT | Mod: PBBFAC,,,

## 2018-12-04 PROCEDURE — 96372 THER/PROPH/DIAG INJ SC/IM: CPT | Mod: PBBFAC

## 2018-12-04 PROCEDURE — 99211 OFF/OP EST MAY X REQ PHY/QHP: CPT | Mod: PBBFAC

## 2018-12-04 RX ADMIN — ESTRADIOL VALERATE 20 MG: 40 INJECTION INTRAMUSCULAR at 01:12

## 2018-12-04 RX ADMIN — TESTOSTERONE CYPIONATE 50 MG: 200 INJECTION, SOLUTION INTRAMUSCULAR at 01:12

## 2019-01-02 ENCOUNTER — CLINICAL SUPPORT (OUTPATIENT)
Dept: OBSTETRICS AND GYNECOLOGY | Facility: CLINIC | Age: 49
End: 2019-01-02
Payer: MEDICAID

## 2019-01-02 VITALS — BODY MASS INDEX: 48.39 KG/M2 | WEIGHT: 264.56 LBS

## 2019-01-02 DIAGNOSIS — Z79.890 POSTMENOPAUSAL HRT (HORMONE REPLACEMENT THERAPY): Primary | ICD-10-CM

## 2019-01-02 PROCEDURE — 99999 PR PBB SHADOW E&M-EST. PATIENT-LVL III: CPT | Mod: PBBFAC,,,

## 2019-01-02 PROCEDURE — 99999 PR PBB SHADOW E&M-EST. PATIENT-LVL III: ICD-10-PCS | Mod: PBBFAC,,,

## 2019-01-02 PROCEDURE — 96372 THER/PROPH/DIAG INJ SC/IM: CPT | Mod: PBBFAC

## 2019-01-02 PROCEDURE — 99213 OFFICE O/P EST LOW 20 MIN: CPT | Mod: PBBFAC

## 2019-01-02 RX ADMIN — TESTOSTERONE CYPIONATE 50 MG: 200 INJECTION, SOLUTION INTRAMUSCULAR at 01:01

## 2019-01-02 RX ADMIN — ESTRADIOL VALERATE 20 MG: 40 INJECTION INTRAMUSCULAR at 01:01

## 2019-01-02 NOTE — PROGRESS NOTES
hormonal replacement therapy (delestrogen & depotestosterone) ,injection given via l gluteal    information given to pt about menopause & hrt medications, next appt on 1/30/19

## 2019-01-30 ENCOUNTER — CLINICAL SUPPORT (OUTPATIENT)
Dept: OBSTETRICS AND GYNECOLOGY | Facility: CLINIC | Age: 49
End: 2019-01-30
Payer: MEDICAID

## 2019-01-30 VITALS — WEIGHT: 260.13 LBS | BODY MASS INDEX: 47.58 KG/M2

## 2019-01-30 DIAGNOSIS — Z79.890 POSTMENOPAUSAL HRT (HORMONE REPLACEMENT THERAPY): Primary | ICD-10-CM

## 2019-01-30 PROCEDURE — 99999 PR PBB SHADOW E&M-EST. PATIENT-LVL III: ICD-10-PCS | Mod: PBBFAC,,,

## 2019-01-30 PROCEDURE — 96372 THER/PROPH/DIAG INJ SC/IM: CPT | Mod: PBBFAC

## 2019-01-30 PROCEDURE — 99213 OFFICE O/P EST LOW 20 MIN: CPT | Mod: PBBFAC,25

## 2019-01-30 PROCEDURE — 99999 PR PBB SHADOW E&M-EST. PATIENT-LVL III: CPT | Mod: PBBFAC,,,

## 2019-01-30 RX ADMIN — ESTRADIOL VALERATE 20 MG: 40 INJECTION INTRAMUSCULAR at 01:01

## 2019-01-30 RX ADMIN — TESTOSTERONE CYPIONATE 50 MG: 200 INJECTION, SOLUTION INTRAMUSCULAR at 01:01

## 2019-01-30 NOTE — PROGRESS NOTES
PT ARRIVED @ 1314 , PT'S ORIGINAL APPT WAS @  1300 , PT WAS ROOMED @ 1317    hormonal replacement therapy (delestrogen & depotestosterone) , information given to pt about menopause & hrt medications,injection given via  R GLUTEAL   Without incident.

## 2019-02-07 ENCOUNTER — OFFICE VISIT (OUTPATIENT)
Dept: OBSTETRICS AND GYNECOLOGY | Facility: CLINIC | Age: 49
End: 2019-02-07
Payer: MEDICAID

## 2019-02-07 VITALS
BODY MASS INDEX: 48.85 KG/M2 | HEIGHT: 62 IN | RESPIRATION RATE: 15 BRPM | WEIGHT: 265.44 LBS | SYSTOLIC BLOOD PRESSURE: 150 MMHG | DIASTOLIC BLOOD PRESSURE: 90 MMHG

## 2019-02-07 DIAGNOSIS — N95.1 MENOPAUSAL STATE: Primary | ICD-10-CM

## 2019-02-07 DIAGNOSIS — Z90.710 STATUS POST HYSTERECTOMY: ICD-10-CM

## 2019-02-07 DIAGNOSIS — K64.9 HEMORRHOIDS, UNSPECIFIED HEMORRHOID TYPE: ICD-10-CM

## 2019-02-07 PROCEDURE — 99999 PR PBB SHADOW E&M-EST. PATIENT-LVL IV: ICD-10-PCS | Mod: PBBFAC,,, | Performed by: OBSTETRICS & GYNECOLOGY

## 2019-02-07 PROCEDURE — 99213 PR OFFICE/OUTPT VISIT, EST, LEVL III, 20-29 MIN: ICD-10-PCS | Mod: S$PBB,,, | Performed by: OBSTETRICS & GYNECOLOGY

## 2019-02-07 PROCEDURE — 99214 OFFICE O/P EST MOD 30 MIN: CPT | Mod: PBBFAC | Performed by: OBSTETRICS & GYNECOLOGY

## 2019-02-07 PROCEDURE — 99213 OFFICE O/P EST LOW 20 MIN: CPT | Mod: S$PBB,,, | Performed by: OBSTETRICS & GYNECOLOGY

## 2019-02-07 PROCEDURE — 99999 PR PBB SHADOW E&M-EST. PATIENT-LVL IV: CPT | Mod: PBBFAC,,, | Performed by: OBSTETRICS & GYNECOLOGY

## 2019-02-07 NOTE — PROGRESS NOTES
Subjective:      Chief Complaint:vag   Bleeding   DISCHARGE    Menstrual History:    OB History      Para Term  AB Living    4 2 2     2    SAB TAB Ectopic Multiple Live Births                       Menarche age: 11     Patient's last menstrual period was 10/23/2013.            Objective:        History of Present Illness AND  Examination detailed DICTATE:       PROBLEM FOCUSED EXAM    The patient is 48 years old, returned to the clinic for a problem,  4,   para 2.  Pap smear 2018 negative.  Annual exam in 2018.    PAST MEDICAL HISTORY:  Reviewed.    PERTINENT HISTORY:  Hysterectomy, BSO, .  She is on estrogen hormone   replacement therapy.  Complaint of bleeding from either vagina or the rectum.    Noticed a large amount of bleeding.    PHYSICAL EXAMINATION:  VITAL SIGNS:  Blood pressure 150/90, weight 265.  CHEST:  Clear.  HEART:  Regular sinus rhythm.  PELVIC:  External normal.  Vulva normal.  Bartholin, urethral and [place]   negative.  Vagina is clear.  Cervix present, clear mucus.  No bleeding.  Uterus   and adnexa are absent; however, the patient has large multiple hemorrhoids   inflamed.  Bleeding is stopped, however.    My impression is that the bleeding has come from hemorrhoids.    PLAN:  We will seek surgical consultation and possible removal of the   hemorrhoids.      MAHAMED  dd: 2019 13:21:21 (CST)  td: 2019 06:20:36 (CST)  Doc ID   #[id number]  Job ID #[id number]    CC:           Assessment:      Diagnosis: MENOPAUSAL   HEMORRHOIDS       Plan:      Return in 6  months

## 2019-02-13 ENCOUNTER — OFFICE VISIT (OUTPATIENT)
Dept: SURGERY | Facility: CLINIC | Age: 49
End: 2019-02-13
Payer: MEDICAID

## 2019-02-13 VITALS
WEIGHT: 265 LBS | SYSTOLIC BLOOD PRESSURE: 115 MMHG | HEART RATE: 85 BPM | DIASTOLIC BLOOD PRESSURE: 73 MMHG | BODY MASS INDEX: 48.76 KG/M2 | HEIGHT: 62 IN

## 2019-02-13 DIAGNOSIS — K64.2 GRADE III HEMORRHOIDS: Primary | ICD-10-CM

## 2019-02-13 PROCEDURE — 99204 PR OFFICE/OUTPT VISIT, NEW, LEVL IV, 45-59 MIN: ICD-10-PCS | Mod: S$GLB,,, | Performed by: SURGERY

## 2019-02-13 PROCEDURE — 99204 OFFICE O/P NEW MOD 45 MIN: CPT | Mod: S$GLB,,, | Performed by: SURGERY

## 2019-02-13 NOTE — PROGRESS NOTES
History & Physical    SUBJECTIVE:     History of Present Illness:  Patient is a 48 y.o. female presents with hemorrhoids with recent thrombosis and bleeding and pain for past 2 weeks. She was seen by gyn and was referred for management  Chief Complaint   Patient presents with    Consult       Review of patient's allergies indicates:   Allergen Reactions    Codeine Other (See Comments)     Unknown reaction    Toradol [ketorolac] Nausea Only    Ultram [tramadol] Swelling     Throat swelling       Current Outpatient Medications   Medication Sig Dispense Refill    albuterol (ACCUNEB) 0.63 mg/3 mL Nebu Take 0.63 mg by nebulization every 6 (six) hours as needed.      alprazolam (XANAX) 2 MG Tab Take 2 mg by mouth 2 (two) times daily.  0    amLODIPine (NORVASC) 10 MG tablet Take 10 mg by mouth once daily.  2    benzonatate (TESSALON) 200 MG capsule TAKE 1 CAPSULE BY MOUTH THREE TIMES A DAY AS NEEDED FOR COUGH  0    clotrimazole-betamethasone 1-0.05% (LOTRISONE) cream APPLY TO AFFECTED AREA TWICE DAILY 15 g 2    cyclobenzaprine (FLEXERIL) 10 MG tablet       ergocalciferol (ERGOCALCIFEROL) 50,000 unit Cap Take 50,000 Units by mouth every 7 days.      fluticasone-vilanterol (BREO) 100-25 mcg/dose diskus inhaler Inhale 1 puff into the lungs once daily. Controller      hydroCHLOROthiazide (HYDRODIURIL) 12.5 MG Tab Take 12.5 mg by mouth once daily.      hydrocodone-acetaminophen 10-325mg (NORCO)  mg Tab Take by mouth.      meloxicam (MOBIC) 15 MG tablet Take 15 mg by mouth once daily.      omeprazole (PRILOSEC) 40 MG capsule Take 40 mg by mouth once daily.  5    sertraline (ZOLOFT) 25 MG tablet Take 50 mg by mouth once daily.       terconazole (TERAZOL 3) 0.8 % vaginal cream INSERT ONE APPLICATORFUL VAGINALLY AT BEDTIME 20 g 1    VENTOLIN HFA 90 mcg/actuation inhaler 2 puffs every 4 to 6 hours as needed.  4     Current Facility-Administered Medications   Medication Dose Route Frequency Provider Last Rate  Last Dose    testosterone cypionate injection 50 mg  50 mg Intramuscular Q28 Days Carlos Delgado MD   50 mg at 19 1348       Past Medical History:   Diagnosis Date    Anemia     Anesthesia     PT HAS BULGING DISC/ HERNIATED  DISC C2-C7    Anxiety     Arthritis     RA    Asthma     Back problem     Blood transfusion     Cervical pain     COPD (chronic obstructive pulmonary disease)     CTS (carpal tunnel syndrome)     GERD (gastroesophageal reflux disease)     Joint pain     shoulder    Joint pain     knee    Lumbar pain     Migraine     Neck pain     Panic attacks     Rotator cuff tear     RIGHT    Sciatica     Thyroid disease      Past Surgical History:   Procedure Laterality Date    APPENDECTOMY       SECTION, LOW TRANSVERSE      x 2    HYSTERECTOMY      HYSTERECTOMY, SUPRACERVICAL  2013    [facility]    left ankle surg      plates and screws    OOPHORECTOMY      SALPINGO-OOPHORECTOMY, BILATERAL  2013    [facility] at [facility]    TONSILLECTOMY       Family History   Problem Relation Age of Onset    Diabetes Sister     Breast cancer Maternal Aunt     Breast cancer Paternal Grandmother      Social History     Tobacco Use    Smoking status: Current Some Day Smoker     Packs/day: 1.00     Years: 8.00     Pack years: 8.00    Smokeless tobacco: Current User   Substance Use Topics    Alcohol use: No    Drug use: No        Review of Systems:  Review of Systems   Constitutional: Negative.    HENT: Negative.    Eyes: Negative.    Respiratory: Negative.    Cardiovascular: Negative.    Gastrointestinal: Positive for anal bleeding and blood in stool.   Endocrine: Negative.    Musculoskeletal: Negative.    Skin: Negative.    Allergic/Immunologic: Negative.    Neurological: Negative.    Hematological: Negative.    Psychiatric/Behavioral: Negative.    All other systems reviewed and are negative.      OBJECTIVE:  "    Vital Signs (Most Recent)  Pulse: 85 (02/13/19 1115)  BP: 115/73 (02/13/19 1115)  5' 2" (1.575 m)  120.2 kg (265 lb)     Physical Exam:  Physical Exam   Constitutional: She is oriented to person, place, and time. She appears well-developed and well-nourished.   HENT:   Head: Normocephalic and atraumatic.   Right Ear: External ear normal.   Left Ear: External ear normal.   Nose: Nose normal.   Mouth/Throat: Oropharynx is clear and moist.   Eyes: Conjunctivae and EOM are normal. Pupils are equal, round, and reactive to light.   Neck: Normal range of motion. Neck supple.   Cardiovascular: Normal rate, regular rhythm, normal heart sounds and intact distal pulses.   Pulmonary/Chest: Effort normal and breath sounds normal.   Abdominal: Soft. Bowel sounds are normal.   Genitourinary:         Musculoskeletal: Normal range of motion.   Neurological: She is alert and oriented to person, place, and time. She has normal reflexes.   Skin: Skin is warm and dry.   Psychiatric: She has a normal mood and affect. Her behavior is normal. Thought content normal.   Vitals reviewed.      Laboratory  none    Diagnostic Results:  none    ASSESSMENT/PLAN:     Hemorrhoids Grade III    PLAN:Plan     I will send her for colonoscopy then back for hemorrhoidectomy       "

## 2019-02-13 NOTE — LETTER
February 13, 2019      Aguila Judd MD  [address]  [place] [address]           [facility],  [facility] [address], [address]  [place] [address]  Phone: [phone]  Fax: [phone]          Patient: Haydee Arreola   MR Number: [id number]   YOB: 1970   Date of Visit: 2/13/2019       Dear Dr. Aguila Judd:    Thank you for referring Haydee Arreola to me for evaluation. Attached you will find relevant portions of my assessment and plan of care.    If you have questions, please do not hesitate to call me. I look forward to following Haydee Arreola along with you.    Sincerely,    Jonathan Storm MD    Enclosure  CC:  No Recipients    If you would like to receive this communication electronically, please contact [email or web address] or [phone] to request more information on [employer] Link access.    For providers and/or their staff who would like to refer a patient to [facility], please contact us through our one-stop-shop provider referral line, [facility] [place], operated [facility], at [phone].    If you feel you have received this communication in error or would no longer like to receive these types of communications, please e-mail [email or web address]

## 2019-02-27 ENCOUNTER — CLINICAL SUPPORT (OUTPATIENT)
Dept: OBSTETRICS AND GYNECOLOGY | Facility: CLINIC | Age: 49
End: 2019-02-27
Payer: MEDICAID

## 2019-02-27 VITALS — WEIGHT: 260 LBS | BODY MASS INDEX: 47.55 KG/M2

## 2019-02-27 DIAGNOSIS — Z79.890 POST-MENOPAUSE ON HRT (HORMONE REPLACEMENT THERAPY): Primary | ICD-10-CM

## 2019-02-27 PROCEDURE — 99213 OFFICE O/P EST LOW 20 MIN: CPT | Mod: PBBFAC,25

## 2019-02-27 PROCEDURE — 99999 PR PBB SHADOW E&M-EST. PATIENT-LVL III: CPT | Mod: PBBFAC,,,

## 2019-02-27 PROCEDURE — 96372 THER/PROPH/DIAG INJ SC/IM: CPT | Mod: PBBFAC

## 2019-02-27 PROCEDURE — 99999 PR PBB SHADOW E&M-EST. PATIENT-LVL III: ICD-10-PCS | Mod: PBBFAC,,,

## 2019-02-27 RX ORDER — ESTRADIOL VALERATE 40 MG/ML
20 INJECTION INTRAMUSCULAR
Status: COMPLETED | OUTPATIENT
Start: 2019-02-28 | End: 2019-07-17

## 2019-02-27 RX ADMIN — TESTOSTERONE CYPIONATE 50 MG: 200 INJECTION, SOLUTION INTRAMUSCULAR at 02:02

## 2019-02-27 RX ADMIN — ESTRADIOL VALERATE 20 MG: 40 INJECTION INTRAMUSCULAR at 02:02

## 2019-02-27 NOTE — PROGRESS NOTES
PT ARRIVED @  1246   , PT'S ORIGINAL APPT WAS @ 1300  , PT WAS ROOMED @  1313    hormonal replacement therapy (delestrogen & depotestosterone) , information given to pt about menopause & hrt medications,injection given via l gluteal  Without incident.

## 2019-03-08 ENCOUNTER — TELEPHONE (OUTPATIENT)
Dept: SURGERY | Facility: CLINIC | Age: 49
End: 2019-03-08

## 2019-03-08 NOTE — TELEPHONE ENCOUNTER
[facility] no longer takes pts insurance.  She is calling [facility] to see which gi dr they cover.       ----- Message from Raegan Madison sent at 3/8/2019  2:25 PM CST -----  Contact: self - [phone]  Pt states she went to  yesterday for colonoscopy and they said as of 02/15 they no longer take her ins. She is asking for orders to be sent somewhere else so she can complete before surgery.

## 2019-03-27 ENCOUNTER — CLINICAL SUPPORT (OUTPATIENT)
Dept: OBSTETRICS AND GYNECOLOGY | Facility: CLINIC | Age: 49
End: 2019-03-27
Payer: MEDICAID

## 2019-03-27 VITALS — BODY MASS INDEX: 47.58 KG/M2 | WEIGHT: 260.13 LBS

## 2019-03-27 DIAGNOSIS — Z79.890 POST-MENOPAUSE ON HRT (HORMONE REPLACEMENT THERAPY): Primary | ICD-10-CM

## 2019-03-27 PROCEDURE — 96372 THER/PROPH/DIAG INJ SC/IM: CPT | Mod: PBBFAC

## 2019-03-27 PROCEDURE — 99999 PR PBB SHADOW E&M-EST. PATIENT-LVL III: CPT | Mod: PBBFAC,,,

## 2019-03-27 PROCEDURE — 99213 OFFICE O/P EST LOW 20 MIN: CPT | Mod: PBBFAC

## 2019-03-27 PROCEDURE — 99999 PR PBB SHADOW E&M-EST. PATIENT-LVL III: ICD-10-PCS | Mod: PBBFAC,,,

## 2019-03-27 RX ADMIN — TESTOSTERONE CYPIONATE 50 MG: 200 INJECTION, SOLUTION INTRAMUSCULAR at 01:03

## 2019-03-27 RX ADMIN — ESTRADIOL VALERATE 20 MG: 40 INJECTION INTRAMUSCULAR at 01:03

## 2019-03-27 NOTE — PROGRESS NOTES
PT ARRIVED @ 1242   , PT'S ORIGINAL APPT WAS @  1300 , PT WAS ROOMED @  1318    hormonal replacement therapy (delestrogen & depotestosterone) , information given to pt about menopause & hrt medications,injection given via  r gluteal   Without incident.

## 2019-04-24 ENCOUNTER — CLINICAL SUPPORT (OUTPATIENT)
Dept: OBSTETRICS AND GYNECOLOGY | Facility: CLINIC | Age: 49
End: 2019-04-24
Payer: MEDICAID

## 2019-04-24 VITALS — BODY MASS INDEX: 48.39 KG/M2 | WEIGHT: 264.56 LBS

## 2019-04-24 DIAGNOSIS — Z79.890 POST-MENOPAUSE ON HRT (HORMONE REPLACEMENT THERAPY): Primary | ICD-10-CM

## 2019-04-24 PROCEDURE — 99213 OFFICE O/P EST LOW 20 MIN: CPT | Mod: PBBFAC,25

## 2019-04-24 PROCEDURE — 99999 PR PBB SHADOW E&M-EST. PATIENT-LVL III: ICD-10-PCS | Mod: PBBFAC,,,

## 2019-04-24 PROCEDURE — 99999 PR PBB SHADOW E&M-EST. PATIENT-LVL III: CPT | Mod: PBBFAC,,,

## 2019-04-24 PROCEDURE — 96372 THER/PROPH/DIAG INJ SC/IM: CPT | Mod: PBBFAC

## 2019-04-24 RX ORDER — TESTOSTERONE CYPIONATE 200 MG/ML
50 INJECTION, SOLUTION INTRAMUSCULAR
Status: COMPLETED | OUTPATIENT
Start: 2019-04-24 | End: 2019-09-11

## 2019-04-24 RX ADMIN — TESTOSTERONE CYPIONATE 50 MG: 200 INJECTION, SOLUTION INTRAMUSCULAR at 01:04

## 2019-04-24 RX ADMIN — ESTRADIOL VALERATE 20 MG: 40 INJECTION INTRAMUSCULAR at 01:04

## 2019-04-24 NOTE — PROGRESS NOTES
PT ARRIVED @  1254  , PT'S ORIGINAL APPT WAS @ 1300  , PT WAS ROOMED @  1308    hormonal replacement therapy (delestrogen & depotestosterone) ,injection given via r gluteal    information given to pt about menopause & hrt medications,

## 2019-05-22 ENCOUNTER — CLINICAL SUPPORT (OUTPATIENT)
Dept: OBSTETRICS AND GYNECOLOGY | Facility: CLINIC | Age: 49
End: 2019-05-22
Payer: MEDICAID

## 2019-05-22 VITALS — BODY MASS INDEX: 47.98 KG/M2 | WEIGHT: 262.38 LBS

## 2019-05-22 DIAGNOSIS — Z79.890 POST-MENOPAUSE ON HRT (HORMONE REPLACEMENT THERAPY): Primary | ICD-10-CM

## 2019-05-22 PROCEDURE — 99999 PR PBB SHADOW E&M-EST. PATIENT-LVL III: CPT | Mod: PBBFAC,,,

## 2019-05-22 PROCEDURE — 96372 THER/PROPH/DIAG INJ SC/IM: CPT | Mod: PBBFAC

## 2019-05-22 PROCEDURE — 99999 PR PBB SHADOW E&M-EST. PATIENT-LVL III: ICD-10-PCS | Mod: PBBFAC,,,

## 2019-05-22 PROCEDURE — 99213 OFFICE O/P EST LOW 20 MIN: CPT | Mod: PBBFAC

## 2019-05-22 RX ADMIN — ESTRADIOL VALERATE 20 MG: 40 INJECTION INTRAMUSCULAR at 01:05

## 2019-05-22 RX ADMIN — TESTOSTERONE CYPIONATE 50 MG: 200 INJECTION, SOLUTION INTRAMUSCULAR at 01:05

## 2019-05-22 NOTE — PROGRESS NOTES
PT ARRIVED @ 1257   , PT'S ORIGINAL APPT WAS @  1300 , PT WAS ROOMED @  1306    hormonal replacement therapy (delestrogen & depotestosterone) ,injection given via  l gluteal    information given to pt about menopause & hrt medications,

## 2019-06-19 ENCOUNTER — CLINICAL SUPPORT (OUTPATIENT)
Dept: OBSTETRICS AND GYNECOLOGY | Facility: CLINIC | Age: 49
End: 2019-06-19
Payer: MEDICAID

## 2019-06-19 VITALS — BODY MASS INDEX: 50.58 KG/M2 | WEIGHT: 276.56 LBS

## 2019-06-19 DIAGNOSIS — Z79.890 POST-MENOPAUSE ON HRT (HORMONE REPLACEMENT THERAPY): Primary | ICD-10-CM

## 2019-06-19 PROCEDURE — 96372 THER/PROPH/DIAG INJ SC/IM: CPT | Mod: PBBFAC

## 2019-06-19 PROCEDURE — 99999 PR PBB SHADOW E&M-EST. PATIENT-LVL III: CPT | Mod: PBBFAC,,,

## 2019-06-19 PROCEDURE — 99999 PR PBB SHADOW E&M-EST. PATIENT-LVL III: ICD-10-PCS | Mod: PBBFAC,,,

## 2019-06-19 PROCEDURE — 99213 OFFICE O/P EST LOW 20 MIN: CPT | Mod: PBBFAC

## 2019-06-19 RX ADMIN — ESTRADIOL VALERATE 20 MG: 40 INJECTION INTRAMUSCULAR at 01:06

## 2019-06-19 RX ADMIN — TESTOSTERONE CYPIONATE 50 MG: 200 INJECTION, SOLUTION INTRAMUSCULAR at 01:06

## 2019-06-19 NOTE — PROGRESS NOTES
Hormonal Replacement Therapy (Delestrogen & Depotestosterone) ,injection given via Right Gluteal  information given to pt about menopause & hrt medications. Pt tolerated well. Next appointment scheduled.              
details…

## 2019-07-17 ENCOUNTER — CLINICAL SUPPORT (OUTPATIENT)
Dept: OBSTETRICS AND GYNECOLOGY | Facility: CLINIC | Age: 49
End: 2019-07-17
Payer: MEDICAID

## 2019-07-17 VITALS — WEIGHT: 273.38 LBS | BODY MASS INDEX: 50 KG/M2

## 2019-07-17 DIAGNOSIS — Z79.890 POST-MENOPAUSE ON HRT (HORMONE REPLACEMENT THERAPY): Primary | ICD-10-CM

## 2019-07-17 PROCEDURE — 99213 OFFICE O/P EST LOW 20 MIN: CPT | Mod: PBBFAC

## 2019-07-17 PROCEDURE — 99999 PR PBB SHADOW E&M-EST. PATIENT-LVL III: CPT | Mod: PBBFAC,,,

## 2019-07-17 PROCEDURE — 99999 PR PBB SHADOW E&M-EST. PATIENT-LVL III: ICD-10-PCS | Mod: PBBFAC,,,

## 2019-07-17 PROCEDURE — 96372 THER/PROPH/DIAG INJ SC/IM: CPT | Mod: PBBFAC

## 2019-07-17 RX ADMIN — ESTRADIOL VALERATE 20 MG: 40 INJECTION INTRAMUSCULAR at 01:07

## 2019-07-17 RX ADMIN — TESTOSTERONE CYPIONATE 50 MG: 200 INJECTION, SOLUTION INTRAMUSCULAR at 01:07

## 2019-07-17 NOTE — PROGRESS NOTES
PT ARRIVED @  1304  , PT'S ORIGINAL APPT WAS @  1300 , PT WAS ROOMED @ 1315    hormonal replacement therapy (delestrogen & depotestosterone) ,injection given via l gluteal    information given to pt about menopause & hrt medications,

## 2019-08-14 ENCOUNTER — CLINICAL SUPPORT (OUTPATIENT)
Dept: OBSTETRICS AND GYNECOLOGY | Facility: CLINIC | Age: 49
End: 2019-08-14
Payer: MEDICAID

## 2019-08-14 VITALS — BODY MASS INDEX: 50.3 KG/M2 | WEIGHT: 275 LBS

## 2019-08-14 DIAGNOSIS — Z79.890 POST-MENOPAUSE ON HRT (HORMONE REPLACEMENT THERAPY): Primary | ICD-10-CM

## 2019-08-14 PROCEDURE — 99999 PR PBB SHADOW E&M-EST. PATIENT-LVL III: ICD-10-PCS | Mod: PBBFAC,,,

## 2019-08-14 PROCEDURE — 99999 PR PBB SHADOW E&M-EST. PATIENT-LVL III: CPT | Mod: PBBFAC,,,

## 2019-08-14 PROCEDURE — 99213 OFFICE O/P EST LOW 20 MIN: CPT | Mod: PBBFAC

## 2019-08-14 PROCEDURE — 96372 THER/PROPH/DIAG INJ SC/IM: CPT | Mod: PBBFAC

## 2019-08-14 RX ORDER — ESTRADIOL VALERATE 40 MG/ML
20 INJECTION INTRAMUSCULAR
Status: DISCONTINUED | OUTPATIENT
Start: 2019-08-14 | End: 2020-02-12

## 2019-08-14 RX ADMIN — ESTRADIOL VALERATE 20 MG: 40 INJECTION INTRAMUSCULAR at 08:08

## 2019-08-14 RX ADMIN — TESTOSTERONE CYPIONATE 50 MG: 200 INJECTION, SOLUTION INTRAMUSCULAR at 08:08

## 2019-09-11 ENCOUNTER — CLINICAL SUPPORT (OUTPATIENT)
Dept: OBSTETRICS AND GYNECOLOGY | Facility: CLINIC | Age: 49
End: 2019-09-11
Payer: MEDICAID

## 2019-09-11 VITALS — WEIGHT: 280 LBS | BODY MASS INDEX: 51.21 KG/M2

## 2019-09-11 DIAGNOSIS — Z79.890 POST-MENOPAUSE ON HRT (HORMONE REPLACEMENT THERAPY): Primary | ICD-10-CM

## 2019-09-11 PROCEDURE — 99213 OFFICE O/P EST LOW 20 MIN: CPT | Mod: PBBFAC,25

## 2019-09-11 PROCEDURE — 99999 PR PBB SHADOW E&M-EST. PATIENT-LVL III: ICD-10-PCS | Mod: PBBFAC,,,

## 2019-09-11 PROCEDURE — 99999 PR PBB SHADOW E&M-EST. PATIENT-LVL III: CPT | Mod: PBBFAC,,,

## 2019-09-11 PROCEDURE — 96372 THER/PROPH/DIAG INJ SC/IM: CPT | Mod: PBBFAC

## 2019-09-11 RX ADMIN — TESTOSTERONE CYPIONATE 50 MG: 200 INJECTION, SOLUTION INTRAMUSCULAR at 11:09

## 2019-09-11 RX ADMIN — ESTRADIOL VALERATE 20 MG: 40 INJECTION INTRAMUSCULAR at 11:09

## 2019-09-11 NOTE — PROGRESS NOTES
PT ARRIVED @   1132 , PT'S ORIGINAL APPT WAS @ 1130  , PT WAS ROOMED @ 1135    hormonal replacement therapy (delestrogen & depotestosterone) ,injection given via  L GLUTEAL   information given to pt about menopause & hrt medications,

## 2019-10-04 ENCOUNTER — TELEPHONE (OUTPATIENT)
Dept: OBSTETRICS AND GYNECOLOGY | Facility: CLINIC | Age: 49
End: 2019-10-04

## 2019-10-04 NOTE — TELEPHONE ENCOUNTER
10/4/19 @ 1321  SPOKE WITH [facility] HRT INJ APPT R/S TO 10/7/19 @ 11:15 AM      ----- Message from Cristine Walters sent at 10/4/2019 11:35 AM CDT -----  Contact: Self  Type: Patient Call Back    Who called: self    What is the request in detail: patient would like injection rescheduled to 10/07/19    Can the clinic reply by [facility]? no    Would the patient rather a call back or a response via [facility]? call    Best call back number: [phone]

## 2019-10-07 ENCOUNTER — CLINICAL SUPPORT (OUTPATIENT)
Dept: OBSTETRICS AND GYNECOLOGY | Facility: CLINIC | Age: 49
End: 2019-10-07
Payer: MEDICAID

## 2019-10-07 VITALS — WEIGHT: 277.75 LBS | BODY MASS INDEX: 50.81 KG/M2

## 2019-10-07 DIAGNOSIS — Z79.890 POST-MENOPAUSE ON HRT (HORMONE REPLACEMENT THERAPY): Primary | ICD-10-CM

## 2019-10-07 PROCEDURE — 99213 OFFICE O/P EST LOW 20 MIN: CPT | Mod: PBBFAC

## 2019-10-07 PROCEDURE — 96372 THER/PROPH/DIAG INJ SC/IM: CPT | Mod: PBBFAC

## 2019-10-07 PROCEDURE — 99999 PR PBB SHADOW E&M-EST. PATIENT-LVL III: CPT | Mod: PBBFAC,,,

## 2019-10-07 PROCEDURE — 99999 PR PBB SHADOW E&M-EST. PATIENT-LVL III: ICD-10-PCS | Mod: PBBFAC,,,

## 2019-10-07 RX ORDER — TESTOSTERONE CYPIONATE 200 MG/ML
50 INJECTION, SOLUTION INTRAMUSCULAR
Status: DISCONTINUED | OUTPATIENT
Start: 2019-10-07 | End: 2020-02-12

## 2019-10-07 RX ADMIN — TESTOSTERONE CYPIONATE 50 MG: 200 INJECTION, SOLUTION INTRAMUSCULAR at 11:10

## 2019-10-07 RX ADMIN — ESTRADIOL VALERATE 20 MG: 40 INJECTION INTRAMUSCULAR at 11:10

## 2019-10-07 NOTE — PROGRESS NOTES
PT ARRIVED @  1046  , PT'S ORIGINAL APPT WAS @  1115 , PT WAS ROOMED @ 1054    hormonal replacement therapy (delestrogen & depotestosterone) ,injection given via r gluteal    information given to pt about menopause & hrt medications,

## 2019-11-06 ENCOUNTER — CLINICAL SUPPORT (OUTPATIENT)
Dept: OBSTETRICS AND GYNECOLOGY | Facility: CLINIC | Age: 49
End: 2019-11-06
Payer: MEDICAID

## 2019-11-06 DIAGNOSIS — Z79.890 POST-MENOPAUSE ON HRT (HORMONE REPLACEMENT THERAPY): Primary | ICD-10-CM

## 2019-11-06 PROCEDURE — 99999 PR PBB SHADOW E&M-EST. PATIENT-LVL III: CPT | Mod: PBBFAC,,,

## 2019-11-06 PROCEDURE — 96372 THER/PROPH/DIAG INJ SC/IM: CPT | Mod: PBBFAC

## 2019-11-06 PROCEDURE — 99213 OFFICE O/P EST LOW 20 MIN: CPT | Mod: PBBFAC,25

## 2019-11-06 PROCEDURE — 99999 PR PBB SHADOW E&M-EST. PATIENT-LVL III: ICD-10-PCS | Mod: PBBFAC,,,

## 2019-11-06 RX ADMIN — TESTOSTERONE CYPIONATE 50 MG: 200 INJECTION, SOLUTION INTRAMUSCULAR at 01:11

## 2019-11-06 RX ADMIN — ESTRADIOL VALERATE 20 MG: 40 INJECTION INTRAMUSCULAR at 01:11

## 2019-12-04 ENCOUNTER — CLINICAL SUPPORT (OUTPATIENT)
Dept: OBSTETRICS AND GYNECOLOGY | Facility: CLINIC | Age: 49
End: 2019-12-04
Payer: MEDICAID

## 2019-12-04 VITALS — WEIGHT: 275.56 LBS | BODY MASS INDEX: 50.4 KG/M2

## 2019-12-04 DIAGNOSIS — Z79.890 POST-MENOPAUSE ON HRT (HORMONE REPLACEMENT THERAPY): Primary | ICD-10-CM

## 2019-12-04 PROCEDURE — 96372 THER/PROPH/DIAG INJ SC/IM: CPT | Mod: PBBFAC

## 2019-12-04 PROCEDURE — 99999 PR PBB SHADOW E&M-EST. PATIENT-LVL III: CPT | Mod: PBBFAC,,,

## 2019-12-04 PROCEDURE — 99213 OFFICE O/P EST LOW 20 MIN: CPT | Mod: PBBFAC,25

## 2019-12-04 PROCEDURE — 99999 PR PBB SHADOW E&M-EST. PATIENT-LVL III: ICD-10-PCS | Mod: PBBFAC,,,

## 2019-12-04 RX ADMIN — ESTRADIOL VALERATE 20 MG: 40 INJECTION INTRAMUSCULAR at 01:12

## 2019-12-04 RX ADMIN — TESTOSTERONE CYPIONATE 50 MG: 200 INJECTION, SOLUTION INTRAMUSCULAR at 01:12

## 2019-12-04 NOTE — PROGRESS NOTES
PT'S ORIGINAL APPT WAS @   1300  , PT ARRIVED @ 1308     , PT WAS ROOMED @   1312    hormonal replacement therapy (delestrogen & depotestosterone) ,injection given via   R  gluteal   information given to pt about menopause & hrt medications,

## 2020-01-10 ENCOUNTER — CLINICAL SUPPORT (OUTPATIENT)
Dept: OBSTETRICS AND GYNECOLOGY | Facility: CLINIC | Age: 50
End: 2020-01-10
Payer: MEDICAID

## 2020-01-10 VITALS — HEIGHT: 62 IN | BODY MASS INDEX: 52.74 KG/M2 | WEIGHT: 286.63 LBS

## 2020-01-10 DIAGNOSIS — Z79.890 POST-MENOPAUSE ON HRT (HORMONE REPLACEMENT THERAPY): Primary | ICD-10-CM

## 2020-01-10 PROCEDURE — 99213 OFFICE O/P EST LOW 20 MIN: CPT | Mod: PBBFAC

## 2020-01-10 PROCEDURE — 96372 THER/PROPH/DIAG INJ SC/IM: CPT | Mod: PBBFAC

## 2020-01-10 PROCEDURE — 99999 PR PBB SHADOW E&M-EST. PATIENT-LVL III: CPT | Mod: PBBFAC,,,

## 2020-01-10 PROCEDURE — 99999 PR PBB SHADOW E&M-EST. PATIENT-LVL III: ICD-10-PCS | Mod: PBBFAC,,,

## 2020-01-10 RX ADMIN — TESTOSTERONE CYPIONATE 50 MG: 200 INJECTION, SOLUTION INTRAMUSCULAR at 01:01

## 2020-01-10 RX ADMIN — ESTRADIOL VALERATE 20 MG: 40 INJECTION INTRAMUSCULAR at 01:01

## 2020-01-10 NOTE — PROGRESS NOTES
hormonal replacement therapy pT. TOLERATED INJECTION TO r UPPER OUTER QUAD GLUTEUS WITHOUT COMPLICATIONS. PT. STATES SHE PREFERS MEDICATION GIVEN ON RIGHT SIDE BECAUSE SHE HAS PAIN ALREADY AND HEATING PAD IS BEING APPLIED AND PREFER IF SHE DIDN'T HAVE PAIN AND SORENESS TO BOTH SIDES.

## 2020-02-12 ENCOUNTER — OFFICE VISIT (OUTPATIENT)
Dept: OBSTETRICS AND GYNECOLOGY | Facility: CLINIC | Age: 50
End: 2020-02-12
Payer: MEDICAID

## 2020-02-12 ENCOUNTER — CLINICAL SUPPORT (OUTPATIENT)
Dept: OBSTETRICS AND GYNECOLOGY | Facility: CLINIC | Age: 50
End: 2020-02-12
Payer: MEDICAID

## 2020-02-12 VITALS
DIASTOLIC BLOOD PRESSURE: 80 MMHG | WEIGHT: 286.63 LBS | BODY MASS INDEX: 52.42 KG/M2 | SYSTOLIC BLOOD PRESSURE: 129 MMHG

## 2020-02-12 VITALS — WEIGHT: 286.63 LBS | HEIGHT: 62 IN | BODY MASS INDEX: 52.74 KG/M2

## 2020-02-12 DIAGNOSIS — Z79.890 POST-MENOPAUSE ON HRT (HORMONE REPLACEMENT THERAPY): ICD-10-CM

## 2020-02-12 DIAGNOSIS — Z79.890 POST-MENOPAUSE ON HRT (HORMONE REPLACEMENT THERAPY): Primary | ICD-10-CM

## 2020-02-12 DIAGNOSIS — N64.52 NIPPLE DISCHARGE IN FEMALE: Primary | ICD-10-CM

## 2020-02-12 DIAGNOSIS — Z12.31 ENCOUNTER FOR SCREENING MAMMOGRAM FOR HIGH-RISK PATIENT: ICD-10-CM

## 2020-02-12 PROCEDURE — 87077 CULTURE AEROBIC IDENTIFY: CPT

## 2020-02-12 PROCEDURE — 87186 SC STD MICRODIL/AGAR DIL: CPT

## 2020-02-12 PROCEDURE — 99999 PR PBB SHADOW E&M-EST. PATIENT-LVL III: CPT | Mod: PBBFAC,,, | Performed by: OBSTETRICS & GYNECOLOGY

## 2020-02-12 PROCEDURE — 99213 OFFICE O/P EST LOW 20 MIN: CPT | Mod: PBBFAC,25 | Performed by: OBSTETRICS & GYNECOLOGY

## 2020-02-12 PROCEDURE — 99214 PR OFFICE/OUTPT VISIT, EST, LEVL IV, 30-39 MIN: ICD-10-PCS | Mod: S$PBB,,, | Performed by: OBSTETRICS & GYNECOLOGY

## 2020-02-12 PROCEDURE — 87070 CULTURE OTHR SPECIMN AEROBIC: CPT

## 2020-02-12 PROCEDURE — 99214 OFFICE O/P EST MOD 30 MIN: CPT | Mod: S$PBB,,, | Performed by: OBSTETRICS & GYNECOLOGY

## 2020-02-12 PROCEDURE — 87075 CULTR BACTERIA EXCEPT BLOOD: CPT

## 2020-02-12 PROCEDURE — 99999 PR PBB SHADOW E&M-EST. PATIENT-LVL III: ICD-10-PCS | Mod: PBBFAC,,, | Performed by: OBSTETRICS & GYNECOLOGY

## 2020-02-12 PROCEDURE — 96372 THER/PROPH/DIAG INJ SC/IM: CPT | Mod: PBBFAC

## 2020-02-12 RX ORDER — TESTOSTERONE CYPIONATE 200 MG/ML
50 INJECTION, SOLUTION INTRAMUSCULAR
Status: COMPLETED | OUTPATIENT
Start: 2020-02-24 | End: 2020-07-15

## 2020-02-12 RX ORDER — ESTRADIOL VALERATE 40 MG/ML
20 INJECTION INTRAMUSCULAR
Status: COMPLETED | OUTPATIENT
Start: 2020-03-11 | End: 2020-07-15

## 2020-02-12 RX ORDER — NYSTATIN 100000 [USP'U]/ML
4 SUSPENSION ORAL
Qty: 160 ML | Refills: 0 | Status: SHIPPED | OUTPATIENT
Start: 2020-02-12 | End: 2020-02-22

## 2020-02-12 RX ADMIN — ESTRADIOL VALERATE 20 MG: 40 INJECTION INTRAMUSCULAR at 02:02

## 2020-02-12 RX ADMIN — TESTOSTERONE CYPIONATE 50 MG: 200 INJECTION, SOLUTION INTRAMUSCULAR at 03:02

## 2020-02-12 NOTE — PROGRESS NOTES
PT. TOLERATED HRT INJECTION TO L UPPER OUTER QUAD GLUTEUS WITHOUT COMPLICATIONS. NEW ORDER BY DR. PATEL

## 2020-02-12 NOTE — PATIENT INSTRUCTIONS
Mammography    Mammography is an X-ray exam of your breast tissue. The image it makes is called a mammogram. A mammogram can help find problems with your breasts, such as cysts or cancer. Mammography is the best breast cancer screening tool available.  Have screening mammograms and professional breast exams as often as your healthcare provider recommends. Also, be sure you know how your breasts normally look and feel. This makes it easier to notice any changes. Report changes to your healthcare provider as soon as possible.    How do I get ready for a mammogram?   · Schedule the test for 1 week after your period. Your breasts are less sore then.  · Make sure your clinic gets images of your last mammogram if it was done somewhere else. This lets the provider compare the 2 sets of images for any changes.  · On the morning of your test, dont use deodorant, powder, or perfume.  · Wear a top that you can take off easily.  What happens during a mammogram?   · You will need to undress from the waist up.  · The technologist will position your breast to get the best test results.  · Each of your breasts will be compressed one at a time. This helps get the most complete X-ray image.  · Your breasts will be repositioned to get at least 2 separate views of each breast.  What happens after a mammogram?  · More X-rays are sometimes needed. Youll be called to schedule them.  · You should receive your test results in writing. Ask about this on the day of your appointment.  · Have mammograms as often as your healthcare provider recommends.  Let the technologist know if:  · Youre pregnant or think you may be pregnant  · You have breast implants  · You have any scars or moles on or near your breasts  · Youve had a breast biopsy or surgery  · Youre breastfeeding   Date Last Reviewed: 6/2/2015  © 0304-7689 [employer]. [address] [place] [address], [place] [address]. All rights reserved. This information is not intended  as a substitute for professional medical care. Always follow your healthcare professional's instructions.        Hormone Therapy for Women    Hormone therapy (HT) increases the levels of the hormones estrogen and progesterone in your body. This can help reduce symptoms of menopause. HT may also help prevent osteoporosis in some women. But HT may increase risk for certain conditions, including blood clots, gallstones, heart disease, and stroke. However, HT may also reduce the risk of heart disease in some women.  How to take hormones  To get the best results, always take your hormones exactly as directed. Hormones can be taken in any of these ways:  · Pills containing estrogen, and sometimes other hormones, are taken as often as every day. This is the most common form of hormone therapy.  · A patch, spray, or gel releases estrogen into the bloodstream through the skin. There is also a patch that contains estrogen and progesterone. The patch can be worn on your hip. Most patches are changed once or twice a week.  · Vaginal ring containing estrogen.  · Cream used inside the vagina releases estrogen locally. Only a very small amount gets into the bloodstream. For this reason, vaginal creams can treat vaginal atrophy and dryness, but are not used to treat hot flashes. The creams do not significantly increase your risk for heart attack or stroke. However, if used more than twice a week in other than very small doses, estrogen does get into the bloodstream in significant amounts. This may affect the uterus if present.  · Prolonged exposure of estrogen in the blood without the use of a progestin increases the risk of cancer of the uterus.  Follow up visits  Have regular visits with a healthcare provider. These visits are a way to fine-tune your therapy. You can also be checked for any problems that might require you to stop HT.  Call your healthcare provider  If you have any of the following symptoms, call your healthcare  provider:  · Unexpected vaginal bleeding  · A breast lump, or breast tenderness that doesnt go away  · Severe headaches  · Aching muscles in your back or legs  · Sudden pain in your legs or chest  · Shortness of breath   Date Last Reviewed: 2/1/2017  © 1047-2566 [employer]. [address] [address]. All rights reserved. This information is not intended as a substitute for professional medical care. Always follow your healthcare professional's instructions.

## 2020-02-15 LAB — BACTERIA SPEC AEROBE CULT: ABNORMAL

## 2020-02-16 LAB — BACTERIA SPEC ANAEROBE CULT: NORMAL

## 2020-02-22 DIAGNOSIS — N61.1: Primary | ICD-10-CM

## 2020-02-22 RX ORDER — CLINDAMYCIN HYDROCHLORIDE 300 MG/1
300 CAPSULE ORAL EVERY 8 HOURS
Qty: 21 CAPSULE | Refills: 0 | Status: SHIPPED | OUTPATIENT
Start: 2020-02-22 | End: 2020-02-29

## 2020-02-24 ENCOUNTER — TELEPHONE (OUTPATIENT)
Dept: OBSTETRICS AND GYNECOLOGY | Facility: CLINIC | Age: 50
End: 2020-02-24

## 2020-02-24 NOTE — TELEPHONE ENCOUNTER
----- Message from Sayda Ortiz MD sent at 2/22/2020  1:53 PM CST -----  Please inform the patient that antibiotics were sent because the fluid collected from her nipple returned as an infection.    Dr. Poe

## 2020-03-01 NOTE — PROGRESS NOTES
History & Physical  Gynecology      SUBJECTIVE:     Chief Complaint: Consult ( HRT injection)       History of Present Illness:  Hormone Replacement Counseling  Ms. Arreola is a 50 y/o female who presents to discuss hormone replacement therapy. She reports that she has been having nipple discharge for years. She reports that her mammograms have been normal but no one has worked up her nipple discharge. She denies vaginal bleeding. She is s/p hysterectomy. The patient is taking hormone replacement therapy. The patient is not sexually active.     Review of patient's allergies indicates:   Allergen Reactions    Codeine Other (See Comments)     Unknown reaction  Unknown reaction    Ketorolac Nausea Only    Tramadol Swelling     Throat swelling  Throat swelling       Past Medical History:   Diagnosis Date    Anemia     Anesthesia     PT HAS BULGING DISC/ HERNIATED  DISC C2-C7    Anxiety     Arthritis     RA    Asthma     Back problem     Blood transfusion     Cervical pain     COPD (chronic obstructive pulmonary disease)     CTS (carpal tunnel syndrome)     GERD (gastroesophageal reflux disease)     Joint pain     shoulder    Joint pain     knee    Lumbar pain     Migraine     Neck pain     Panic attacks     Rotator cuff tear     RIGHT    Sciatica     Thyroid disease      Past Surgical History:   Procedure Laterality Date    APPENDECTOMY       SECTION, LOW TRANSVERSE      x 2    HYSTERECTOMY      left ankle surg      plates and screws    OOPHORECTOMY      TONSILLECTOMY       OB History        4    Para   2    Term   2            AB        Living   2       SAB        TAB        Ectopic        Multiple        Live Births                   Family History   Problem Relation Age of Onset    Diabetes Sister     Breast cancer Maternal Aunt     Breast cancer Paternal Grandmother      Social History     Tobacco Use    Smoking status: Current Some Day Smoker     Packs/day:  1.00     Years: 8.00     Pack years: 8.00    Smokeless tobacco: Current User   Substance Use Topics    Alcohol use: No    Drug use: No       Current Outpatient Medications   Medication Sig    albuterol (ACCUNEB) 0.63 mg/3 mL Nebu Take 0.63 mg by nebulization every 6 (six) hours as needed.    alprazolam (XANAX) 2 MG Tab Take 2 mg by mouth 2 (two) times daily.    amLODIPine (NORVASC) 10 MG tablet Take 10 mg by mouth once daily.    benzonatate (TESSALON) 200 MG capsule TAKE 1 CAPSULE BY MOUTH THREE TIMES A DAY AS NEEDED FOR COUGH    clotrimazole-betamethasone 1-0.05% (LOTRISONE) cream APPLY TO AFFECTED AREA TWICE DAILY    cyclobenzaprine (FLEXERIL) 10 MG tablet     hydroCHLOROthiazide (HYDRODIURIL) 12.5 MG Tab Take 12.5 mg by mouth once daily.    omeprazole (PRILOSEC) 40 MG capsule Take 40 mg by mouth once daily.    sertraline (ZOLOFT) 25 MG tablet Take 100 mg by mouth once daily.     terconazole (TERAZOL 3) 0.8 % vaginal cream INSERT ONE APPLICATORFUL VAGINALLY AT BEDTIME    VENTOLIN HFA 90 mcg/actuation inhaler 2 puffs every 4 to 6 hours as needed.    clindamycin (CLEOCIN) 300 MG capsule Take 1 capsule (300 mg total) by mouth every 8 (eight) hours. for 7 days     Current Facility-Administered Medications   Medication    [START ON 3/11/2020] estradiol valerate injection 20 mg    testosterone cypionate injection 50 mg         Review of Systems:  Review of Systems   Constitutional: Negative for chills and fever.   Respiratory: Negative for cough and wheezing.    Cardiovascular: Negative for chest pain and palpitations.   Gastrointestinal: Negative for abdominal pain, blood in stool, nausea and vomiting.   Endocrine: Positive for hot flashes.   Genitourinary: Positive for hot flashes. Negative for dysuria, frequency, hematuria, pelvic pain, vaginal bleeding, vaginal discharge and vaginal pain.   Integumentary:  Positive for nipple discharge.   Psychiatric/Behavioral: Positive for depression.   Breast:  Positive for nipple discharge.       OBJECTIVE:     Physical Exam:  Physical Exam   Constitutional: She is oriented to person, place, and time. She appears well-developed and well-nourished.   Cardiovascular: Normal rate.   Pulmonary/Chest: Effort normal. No respiratory distress. Right breast exhibits nipple discharge. Left breast exhibits nipple discharge.   Genitourinary: Pelvic exam was performed with patient supine.   Neurological: She is oriented to person, place, and time.   Skin: Skin is warm and dry.   Psychiatric: She has a normal mood and affect.   Nursing note and vitals reviewed.      ASSESSMENT:       ICD-10-CM ICD-9-CM    1. Nipple discharge in female N64.52 611.79 Mammo Digital Diagnostic Bilat with Lino      Prolactin      TSH      CULTURE, AEROBIC  (SPECIFY SOURCE)      Culture, Anaerobic   2. Encounter for screening mammogram for high-risk patient Z12.31 V76.11 Mammo Digital Diagnostic Bilat with Lino   3. Post-menopause on HRT (hormone replacement therapy) Z79.890 V07.4 estradiol valerate injection 20 mg      testosterone cypionate injection 50 mg     Plan:      Haydee was seen today for consult.    Diagnoses and all orders for this visit:    Nipple discharge in female  -     Mammo Digital Diagnostic Bilat with Lino; Future  -     Prolactin; Future  -     TSH; Future  -     CULTURE, AEROBIC  (SPECIFY SOURCE)  -     Culture, Anaerobic    Encounter for screening mammogram for high-risk patient  -     Mammo Digital Diagnostic Bilat with Lino; Future    Post-menopause on HRT (hormone replacement therapy)  -     estradiol valerate injection 20 mg  -     testosterone cypionate injection 50 mg    Other orders  -     nystatin (MYCOSTATIN) 100,000 unit/mL suspension; Take 4 mLs (400,000 Units total) by mouth after meals as needed.        Orders Placed This Encounter   Procedures    CULTURE, AEROBIC  (SPECIFY SOURCE)    Culture, Anaerobic    Mammo Digital Diagnostic Bilat with Lino    Prolactin    TSH        Follow up in about 3 months (around 5/12/2020) for [employer].     Counseling time: 20 minutes    Sayda Ortiz

## 2020-03-11 ENCOUNTER — CLINICAL SUPPORT (OUTPATIENT)
Dept: OBSTETRICS AND GYNECOLOGY | Facility: CLINIC | Age: 50
End: 2020-03-11
Payer: MEDICAID

## 2020-03-11 DIAGNOSIS — Z79.890 POST-MENOPAUSE ON HRT (HORMONE REPLACEMENT THERAPY): Primary | ICD-10-CM

## 2020-03-11 PROCEDURE — 99999 PR PBB SHADOW E&M-EST. PATIENT-LVL II: ICD-10-PCS | Mod: PBBFAC,,,

## 2020-03-11 PROCEDURE — 99212 OFFICE O/P EST SF 10 MIN: CPT | Mod: PBBFAC

## 2020-03-11 PROCEDURE — 96372 THER/PROPH/DIAG INJ SC/IM: CPT | Mod: PBBFAC

## 2020-03-11 PROCEDURE — 99999 PR PBB SHADOW E&M-EST. PATIENT-LVL II: CPT | Mod: PBBFAC,,,

## 2020-03-11 RX ADMIN — TESTOSTERONE CYPIONATE 50 MG: 200 INJECTION, SOLUTION INTRAMUSCULAR at 03:03

## 2020-03-11 RX ADMIN — ESTRADIOL VALERATE 20 MG: 40 INJECTION INTRAMUSCULAR at 03:03

## 2020-04-09 ENCOUNTER — CLINICAL SUPPORT (OUTPATIENT)
Dept: OBSTETRICS AND GYNECOLOGY | Facility: CLINIC | Age: 50
End: 2020-04-09
Payer: MEDICAID

## 2020-04-09 VITALS — WEIGHT: 290.56 LBS | BODY MASS INDEX: 53.15 KG/M2

## 2020-04-09 DIAGNOSIS — Z79.890 POST-MENOPAUSE ON HRT (HORMONE REPLACEMENT THERAPY): Primary | ICD-10-CM

## 2020-04-09 PROCEDURE — 99999 PR PBB SHADOW E&M-EST. PATIENT-LVL III: CPT | Mod: PBBFAC,,,

## 2020-04-09 PROCEDURE — 99999 PR PBB SHADOW E&M-EST. PATIENT-LVL III: ICD-10-PCS | Mod: PBBFAC,,,

## 2020-04-09 PROCEDURE — 96372 THER/PROPH/DIAG INJ SC/IM: CPT | Mod: PBBFAC

## 2020-04-09 RX ADMIN — ESTRADIOL VALERATE 20 MG: 40 INJECTION INTRAMUSCULAR at 01:04

## 2020-04-09 RX ADMIN — TESTOSTERONE CYPIONATE 50 MG: 200 INJECTION, SOLUTION INTRAMUSCULAR at 01:04

## 2020-04-09 NOTE — PROGRESS NOTES
HRT inj administered without difficulty. Pt instructed to sit in waiting room to monitor for s/s of adverse reaction.

## 2020-05-12 ENCOUNTER — TELEPHONE (OUTPATIENT)
Dept: OBSTETRICS AND GYNECOLOGY | Facility: CLINIC | Age: 50
End: 2020-05-12

## 2020-05-12 NOTE — TELEPHONE ENCOUNTER
----- Message from Erin Mukesh sent at 5/11/2020  1:30 PM CDT -----  Contact: pt  Type: Patient Call Back    Who called:pt    What is the request in detail:pt is calling to reschedule injection for 5/13 @1:15 pm    Can the clinic reply by [facility]?    Would the patient rather a call back or a response via [facility]? call    Best call back number:[phone] (home)       Additional Information:          Inj for HRT has been sched for 5/13/2020 at 1:15 per to request. YAAKOV

## 2020-05-13 ENCOUNTER — CLINICAL SUPPORT (OUTPATIENT)
Dept: OBSTETRICS AND GYNECOLOGY | Facility: CLINIC | Age: 50
End: 2020-05-13
Payer: MEDICAID

## 2020-05-13 VITALS — BODY MASS INDEX: 50.24 KG/M2 | HEIGHT: 62 IN | WEIGHT: 273 LBS

## 2020-05-13 DIAGNOSIS — Z79.890 POST-MENOPAUSE ON HRT (HORMONE REPLACEMENT THERAPY): Primary | ICD-10-CM

## 2020-05-13 PROCEDURE — 99999 PR PBB SHADOW E&M-EST. PATIENT-LVL III: CPT | Mod: PBBFAC,,,

## 2020-05-13 PROCEDURE — 99999 PR PBB SHADOW E&M-EST. PATIENT-LVL III: ICD-10-PCS | Mod: PBBFAC,,,

## 2020-05-13 PROCEDURE — 96372 THER/PROPH/DIAG INJ SC/IM: CPT | Mod: PBBFAC

## 2020-05-13 RX ADMIN — TESTOSTERONE CYPIONATE 50 MG: 200 INJECTION, SOLUTION INTRAMUSCULAR at 01:05

## 2020-05-13 RX ADMIN — ESTRADIOL VALERATE 20 MG: 40 INJECTION INTRAMUSCULAR at 01:05

## 2020-06-15 ENCOUNTER — CLINICAL SUPPORT (OUTPATIENT)
Dept: OBSTETRICS AND GYNECOLOGY | Facility: CLINIC | Age: 50
End: 2020-06-15
Payer: MEDICAID

## 2020-06-15 VITALS — WEIGHT: 277.75 LBS | BODY MASS INDEX: 50.81 KG/M2

## 2020-06-15 DIAGNOSIS — Z79.890 POST-MENOPAUSE ON HRT (HORMONE REPLACEMENT THERAPY): Primary | ICD-10-CM

## 2020-06-15 PROCEDURE — 96372 THER/PROPH/DIAG INJ SC/IM: CPT | Mod: PBBFAC

## 2020-06-15 PROCEDURE — 99999 PR PBB SHADOW E&M-EST. PATIENT-LVL III: ICD-10-PCS | Mod: PBBFAC,,,

## 2020-06-15 PROCEDURE — 99999 PR PBB SHADOW E&M-EST. PATIENT-LVL III: CPT | Mod: PBBFAC,,,

## 2020-06-15 RX ADMIN — TESTOSTERONE CYPIONATE 50 MG: 200 INJECTION, SOLUTION INTRAMUSCULAR at 01:06

## 2020-06-15 RX ADMIN — ESTRADIOL VALERATE 20 MG: 40 INJECTION INTRAMUSCULAR at 01:06

## 2020-06-15 NOTE — PROGRESS NOTES
PT TOLERATED HRT DELESTROGEN ND TESTOSTERONE TO R GLUTEUS WITHOUT COMPLICATIONS.      NO DISTRESS OR ADVERSE REACTIONS NOTED

## 2020-07-15 ENCOUNTER — CLINICAL SUPPORT (OUTPATIENT)
Dept: OBSTETRICS AND GYNECOLOGY | Facility: CLINIC | Age: 50
End: 2020-07-15
Payer: MEDICAID

## 2020-07-15 VITALS — WEIGHT: 280 LBS | BODY MASS INDEX: 51.53 KG/M2 | HEIGHT: 62 IN

## 2020-07-15 DIAGNOSIS — Z79.890 POST-MENOPAUSE ON HRT (HORMONE REPLACEMENT THERAPY): Primary | ICD-10-CM

## 2020-07-15 PROCEDURE — 96372 THER/PROPH/DIAG INJ SC/IM: CPT | Mod: PBBFAC

## 2020-07-15 PROCEDURE — 99999 PR PBB SHADOW E&M-EST. PATIENT-LVL III: CPT | Mod: PBBFAC,,,

## 2020-07-15 PROCEDURE — 99999 PR PBB SHADOW E&M-EST. PATIENT-LVL III: ICD-10-PCS | Mod: PBBFAC,,,

## 2020-07-15 RX ADMIN — ESTRADIOL VALERATE 20 MG: 40 INJECTION INTRAMUSCULAR at 02:07

## 2020-07-15 RX ADMIN — TESTOSTERONE CYPIONATE 50 MG: 200 INJECTION, SOLUTION INTRAMUSCULAR at 02:07

## 2020-07-15 NOTE — PROGRESS NOTES
Pt TOLERATED HRT (DELESTROGEN AND TESTOSTERONE) TO R  GLUTEAL WITHOUT COMPLICATIONS NEXT APPOINTMENT GIVEN

## 2020-08-19 ENCOUNTER — CLINICAL SUPPORT (OUTPATIENT)
Dept: OBSTETRICS AND GYNECOLOGY | Facility: CLINIC | Age: 50
End: 2020-08-19
Payer: MEDICAID

## 2020-08-19 VITALS — BODY MASS INDEX: 53.15 KG/M2 | HEIGHT: 62 IN | WEIGHT: 288.81 LBS

## 2020-08-19 DIAGNOSIS — Z79.890 POST-MENOPAUSE ON HRT (HORMONE REPLACEMENT THERAPY): Primary | ICD-10-CM

## 2020-08-19 PROCEDURE — 96372 THER/PROPH/DIAG INJ SC/IM: CPT | Mod: PBBFAC

## 2020-08-19 PROCEDURE — 99999 PR PBB SHADOW E&M-EST. PATIENT-LVL III: CPT | Mod: PBBFAC,,,

## 2020-08-19 PROCEDURE — 99999 PR PBB SHADOW E&M-EST. PATIENT-LVL III: ICD-10-PCS | Mod: PBBFAC,,,

## 2020-08-19 RX ORDER — TESTOSTERONE CYPIONATE 200 MG/ML
50 INJECTION, SOLUTION INTRAMUSCULAR
Status: COMPLETED | OUTPATIENT
Start: 2020-08-19 | End: 2020-08-19

## 2020-08-19 RX ORDER — ESTRADIOL VALERATE 20 MG/ML
20 INJECTION INTRAMUSCULAR
Status: COMPLETED | OUTPATIENT
Start: 2020-08-19 | End: 2020-08-19

## 2020-08-19 RX ADMIN — ESTRADIOL VALERATE 20 MG: 20 INJECTION INTRAMUSCULAR at 03:08

## 2020-08-19 RX ADMIN — TESTOSTERONE CYPIONATE 50 MG: 200 INJECTION, SOLUTION INTRAMUSCULAR at 03:08

## 2020-08-19 NOTE — PROGRESS NOTES
HRT injection (delestrogen and depotestosterone) given IM to L/ ventroglut without difficulty. Pt advised to remain in the clinin for 15 minutes to monitor for any immediate adverse reactions.

## 2020-09-16 ENCOUNTER — TELEPHONE (OUTPATIENT)
Dept: OBSTETRICS AND GYNECOLOGY | Facility: CLINIC | Age: 50
End: 2020-09-16

## 2020-09-16 NOTE — TELEPHONE ENCOUNTER
Spoke with pt will forward concerns to doctor     ----- Message from Michelle Eddy sent at 9/16/2020  1:51 PM CDT -----  Regarding: self [phone]  .Type: Patient Call Back    Who called: self     What is the request in detail: Pt is requesting to come in for HRT injection     Can the clinic reply by [facility]? Call back     Would the patient rather a call back or a response via [facility]?  Call back     Best call back number: [phone]

## 2020-10-05 ENCOUNTER — OFFICE VISIT (OUTPATIENT)
Dept: OBSTETRICS AND GYNECOLOGY | Facility: CLINIC | Age: 50
End: 2020-10-05
Payer: MEDICAID

## 2020-10-05 VITALS
DIASTOLIC BLOOD PRESSURE: 84 MMHG | BODY MASS INDEX: 52.26 KG/M2 | SYSTOLIC BLOOD PRESSURE: 150 MMHG | HEIGHT: 62 IN | WEIGHT: 284 LBS

## 2020-10-05 DIAGNOSIS — Z01.419 WELL WOMAN EXAM WITH ROUTINE GYNECOLOGICAL EXAM: Primary | ICD-10-CM

## 2020-10-05 DIAGNOSIS — Z79.890 HORMONE REPLACEMENT THERAPY (HRT): ICD-10-CM

## 2020-10-05 PROCEDURE — 99396 PREV VISIT EST AGE 40-64: CPT | Mod: S$PBB,,, | Performed by: OBSTETRICS & GYNECOLOGY

## 2020-10-05 PROCEDURE — 99999 PR PBB SHADOW E&M-EST. PATIENT-LVL II: ICD-10-PCS | Mod: PBBFAC,,, | Performed by: OBSTETRICS & GYNECOLOGY

## 2020-10-05 PROCEDURE — 99396 PR PREVENTIVE VISIT,EST,40-64: ICD-10-PCS | Mod: S$PBB,,, | Performed by: OBSTETRICS & GYNECOLOGY

## 2020-10-05 PROCEDURE — 99212 OFFICE O/P EST SF 10 MIN: CPT | Mod: PBBFAC | Performed by: OBSTETRICS & GYNECOLOGY

## 2020-10-05 PROCEDURE — 99999 PR PBB SHADOW E&M-EST. PATIENT-LVL II: CPT | Mod: PBBFAC,,, | Performed by: OBSTETRICS & GYNECOLOGY

## 2020-10-05 NOTE — PROGRESS NOTES
"  [facility]  Ambulatory Clinic  Obstetrics & Gynecology    Visit Date:  10/5/2020    Chief Complaint:  Annual GYN exam    History of Present Illness:      Haydee Arreola is a 49 y.o. , new pt to me, here for a gynecologic exam.    Pt has h/o supracervical hysterectomy and bilateral salpingo-oophorectomy for AUB by Dr. Delgado.    Pt has been on HRT with depo testosterone and estrogen with Dr. Delgado.    HRT was discontinued today due to COPD, smoking, and general health at risk for adverse cardiovascular event.    Pt denies h/o abnormal pap, last pap ~2018.    Pt denies active sexually transmitted infections.    Pt denies h/o abnormal mammogram, last mammo ~2018.    Pt performs monthly self breast examination, smokes tobacco, uses seat belts, and denies abuse.     Pt denies vaginal bleeding, vaginal discharge, dysmenorrhea, dyspareunia, pelvic pain, bloating, early satiety, unintentional weight loss, breast mass/skin changes, incontinence, GI or urinary complaints.      Otherwise, the pt is in her usual state of health.    Past History:  Gynecologic history as noted above.    Review of Systems:      GENERAL:  No fever, fatigue, excessive weight gain or loss  HEENT:  No headaches, hearing changes, visual disturbance  RESPIRATORY:  +COPD  CARDIOVASCULAR:  No chest pain, heart palpitations, leg swelling  BREAST:  No lump, pain, nipple discharge, skin changes  GASTROINTESTINAL:  No nausea, vomiting, constipation, diarrhea, abd pain, rectal bleeding   GENITOURINARY:  See HPI  ENDOCRINE:  No heat or cold intolerance  HEMATOLOGIC:  No easy bruisability or bleeding   LYMPHATICS:  No enlarged nodes  MUSCULOSKELETAL:  No acute joint pain or swelling  SKIN:  No rash, lesions, jaundice  NEUROLOGIC:  No dizziness, weakness, syncope  PSYCHIATRIC:  No significant mood changes, homicidal/suicidal ideations    Physical Exam:     BP (!) 150/84   Ht 5' 2" (1.575 m)   Wt 128.8 kg (284 lb)   LMP " 10/23/2013   BMI 51.94 kg/m²   Pulse 70, Resp rate 16     GENERAL:  No acute distress, well-nourished  HEENT:  Atraumatic, anicteric, moist mucus membranes. Neck supple w/o masses.  BREAST:  Symmetric, nontender, no obvious masses, adenopathy, skin changes or nipple discharge.  LUNGS:  Clear to auscultation  HEART:  Regular rate and rhythm, no murmurs, gallops, or rubs  ABDOMEN:  Soft, non-tender, non-distended, normoactive bowel sounds, no obvious organomegaly  EXT:  Symmetric w/o cramping, claudication, or edema. +2 distal pulses.  SKIN:  No rashes or bruising  PSYCH:  Mood and affect appropriate  NEURO:  Grossly intact bilaterally    GENITOURINARY:    VULVAR:  Female external genitalia w/o any obvious lesions. Normal urethral meatus. No gross lymphadenopathy.   VAGINA:  Mild, age appropriate vulvovaginal atrophy. No significant cystocele or rectocele. No obvious lesion. No discharge.  CERVIX:   Present. No cervical motion tenderness, discharge, or obvious lesions.  UTERUS:  Surgically absent.   ADNEXA:  Surgically absent.   RECTAL:  Declined. No obvious external lesions    Chaperone present for exam.    Assessment:     49 y.o.  with h/o supracervical hysterectomy with  ovarian conservation:    1. Well woman gynecologic exam  2. HRT counseling    Plan:    A gynecologic health assessment was performed with age appropriate counseling.    Cervical cancer screening - pap up to date.    STI screening - pt declined.      Screening mammogram ordered by Dr. Poe, pt advised to call to schedule.    A full discussion of the benefit-risk ratio of hormonal replacement therapy was carried out. Improvement in vasomotor and other climacteric symptoms is discussed, including possible improvements in sleep and mood. Reduction of risk for osteoporosis was explained. We discussed the study data showing increased risk of thrombo-embolic events such as myocardial infarction, stroke and also possibly breast cancer with estrogen  replacement, and how this might affect her. The range of side effects such as breast tenderness, weight gain and including possible increases in lifetime risk of breast cancer and possible thrombotic complications was discussed. We also discussed ACOG's recommendation to use hormone replacement therapy for the relief of hot flashes alone and to be on the lowest dose possible for the shortest amount of time.  Alternative such as herbal and soy-based products were reviewed. Pt is not a candidate to cardivascular risks.  All of her questions about this therapy were answered.    Encourage healthy lifestyle modifications, monthly self breast exams, Ca/Vit D, postmenopausal bleeding precautions, yearly mammogram, and colonoscopy.    Encourage tobacco cessation, pt not ready to quit, declined help.    F/u with PCP for health maintenance.    Return 1 year for gynecologic exam, or sooner as needed.  All questions answered, pt voiced understanding.        Hakan Duong MD

## 2020-10-06 ENCOUNTER — OFFICE VISIT (OUTPATIENT)
Dept: OBSTETRICS AND GYNECOLOGY | Facility: CLINIC | Age: 50
End: 2020-10-06
Payer: MEDICAID

## 2020-10-06 ENCOUNTER — CLINICAL SUPPORT (OUTPATIENT)
Dept: OBSTETRICS AND GYNECOLOGY | Facility: CLINIC | Age: 50
End: 2020-10-06
Payer: MEDICAID

## 2020-10-06 VITALS
SYSTOLIC BLOOD PRESSURE: 138 MMHG | DIASTOLIC BLOOD PRESSURE: 86 MMHG | WEIGHT: 284 LBS | BODY MASS INDEX: 52.26 KG/M2 | HEIGHT: 62 IN

## 2020-10-06 VITALS — BODY MASS INDEX: 52.25 KG/M2 | HEIGHT: 62 IN | WEIGHT: 283.94 LBS

## 2020-10-06 DIAGNOSIS — Z79.890 POST-MENOPAUSE ON HRT (HORMONE REPLACEMENT THERAPY): Primary | ICD-10-CM

## 2020-10-06 DIAGNOSIS — N95.1 MENOPAUSAL SYMPTOMS: Primary | ICD-10-CM

## 2020-10-06 PROCEDURE — 96372 THER/PROPH/DIAG INJ SC/IM: CPT | Mod: PBBFAC

## 2020-10-06 PROCEDURE — 99999 PR PBB SHADOW E&M-EST. PATIENT-LVL II: ICD-10-PCS | Mod: PBBFAC,,, | Performed by: OBSTETRICS & GYNECOLOGY

## 2020-10-06 PROCEDURE — 99213 PR OFFICE/OUTPT VISIT, EST, LEVL III, 20-29 MIN: ICD-10-PCS | Mod: S$PBB,,, | Performed by: OBSTETRICS & GYNECOLOGY

## 2020-10-06 PROCEDURE — 99999 PR PBB SHADOW E&M-EST. PATIENT-LVL II: CPT | Mod: PBBFAC,,, | Performed by: OBSTETRICS & GYNECOLOGY

## 2020-10-06 PROCEDURE — 99999 PR PBB SHADOW E&M-EST. PATIENT-LVL III: ICD-10-PCS | Mod: PBBFAC,,,

## 2020-10-06 PROCEDURE — 99213 OFFICE O/P EST LOW 20 MIN: CPT | Mod: S$PBB,,, | Performed by: OBSTETRICS & GYNECOLOGY

## 2020-10-06 PROCEDURE — 99999 PR PBB SHADOW E&M-EST. PATIENT-LVL III: CPT | Mod: PBBFAC,,,

## 2020-10-06 PROCEDURE — 99212 OFFICE O/P EST SF 10 MIN: CPT | Mod: PBBFAC | Performed by: OBSTETRICS & GYNECOLOGY

## 2020-10-06 RX ORDER — TESTOSTERONE CYPIONATE 200 MG/ML
50 INJECTION, SOLUTION INTRAMUSCULAR
Status: COMPLETED | OUTPATIENT
Start: 2020-10-06 | End: 2021-03-12

## 2020-10-06 RX ORDER — ESTRADIOL VALERATE 20 MG/ML
20 INJECTION INTRAMUSCULAR
Status: COMPLETED | OUTPATIENT
Start: 2020-10-06 | End: 2021-03-12

## 2020-10-06 RX ADMIN — ESTRADIOL VALERATE 20 MG: 20 INJECTION INTRAMUSCULAR at 04:10

## 2020-10-06 RX ADMIN — TESTOSTERONE CYPIONATE 50 MG: 200 INJECTION, SOLUTION INTRAMUSCULAR at 04:10

## 2020-10-06 NOTE — PROGRESS NOTES
Pt TOLERATED HRT (DELESTROGEN AND TESTOSTERONE) TO L GLUTEAL WITHOUT COMPLICATIONS NEXT APPOINTMENT GIVEN

## 2020-10-07 NOTE — PROGRESS NOTES
"  Subjective:       Patient ID: Haydee Arreola is a 49 y.o. female.    Chief Complaint:  HRT injection      History of Present Illness  HPI  Hormone Replacement Counseling  Patient presents to discuss hormone replacement therapy. Patient is requesting hormone replacement therapy due to hot flashes, insomnia, moodiness, no energy due to supracervical hysterectomy with BSO. The patient is taking hormone replacement therapy. Patient denies post-menopausal vaginal bleeding. The patient is sexually active. GYN screening history: last pap: was normal and last mammogram: approximate date 2018 and was normal. Patient is hormone deficient due to hysterectomy with BSO, which occurred at age 42. The patient currently has symptoms of depression, hot flashes, insomnia, moodiness, no energy, night sweats because she is late for her HRT injection.    Dr. Hakan Duong was reluctant to continue to provide HRT to pt due she heavy smoking use and elevated BP.  Pt presents today to try to continue her HRT.  Pt is very adamant that she continue and she will not accept any alternatives.    Pt having a lot stress due to  in hospice for CHF.    Current hormone therapy:   Estrogen: delestrogen IM and Testosterone                          GYN & OB History  Patient's last menstrual period was 10/23/2013.   Date of Last Pap: 2018    OB History    Para Term  AB Living   4 2 2     2   SAB TAB Ectopic Multiple Live Births                  # Outcome Date GA Lbr Elvis/2nd Weight Sex Delivery Anes PTL Lv   4             3             2 Term            1 Term                Review of Systems  Review of Systems   unchanged     Vitals:    10/06/20 1518   BP: 138/86   Weight: 128.8 kg (284 lb)   Height: 5' 2" (1.575 m)         Objective:    Physical Exam:   Constitutional: She is oriented to person, place, and time. She appears well-developed and well-nourished. No distress.    HENT:   Head: Normocephalic and " atraumatic.     Neck: Normal range of motion.     Pulmonary/Chest: Effort normal. No respiratory distress.        Abdominal: Soft. She exhibits no distension and no mass. There is no abdominal tenderness. There is no rebound and no guarding.             Musculoskeletal: Normal range of motion and moves all extremeties. No tenderness.       Neurological: She is alert and oriented to person, place, and time. No cranial nerve deficit. Coordination normal.    Skin: She is not diaphoretic.    Psychiatric: She has a normal mood and affect. Her behavior is normal. Judgment and thought content normal.          Assessment:        1. Menopausal symptoms     2.  Tobacco abuse          Plan:      R/b/a of HRT thoroughly discussed w/ pt.  Pt is aware of increase risk of DVT/PE and MI while using HRT and smoking.  Pt accepts risks and desires to continue current HRT.  Delestrogen and depotestosterone q mon    Stressed smoking cessation

## 2020-11-06 ENCOUNTER — CLINICAL SUPPORT (OUTPATIENT)
Dept: OBSTETRICS AND GYNECOLOGY | Facility: CLINIC | Age: 50
End: 2020-11-06
Payer: MEDICAID

## 2020-11-06 VITALS — BODY MASS INDEX: 51.94 KG/M2 | WEIGHT: 283.94 LBS

## 2020-11-06 DIAGNOSIS — Z79.890 POST-MENOPAUSE ON HRT (HORMONE REPLACEMENT THERAPY): Primary | ICD-10-CM

## 2020-11-06 PROCEDURE — 96372 THER/PROPH/DIAG INJ SC/IM: CPT | Mod: PBBFAC

## 2020-11-06 PROCEDURE — 99999 PR PBB SHADOW E&M-EST. PATIENT-LVL III: ICD-10-PCS | Mod: PBBFAC,,,

## 2020-11-06 PROCEDURE — 99999 PR PBB SHADOW E&M-EST. PATIENT-LVL III: CPT | Mod: PBBFAC,,,

## 2020-11-06 RX ADMIN — TESTOSTERONE CYPIONATE 50 MG: 200 INJECTION, SOLUTION INTRAMUSCULAR at 02:11

## 2020-11-06 RX ADMIN — ESTRADIOL VALERATE 20 MG: 20 INJECTION INTRAMUSCULAR at 02:11

## 2020-12-10 ENCOUNTER — CLINICAL SUPPORT (OUTPATIENT)
Dept: OBSTETRICS AND GYNECOLOGY | Facility: CLINIC | Age: 50
End: 2020-12-10
Payer: MEDICAID

## 2020-12-10 VITALS — BODY MASS INDEX: 51.94 KG/M2 | WEIGHT: 283.94 LBS

## 2020-12-10 DIAGNOSIS — Z79.890 POST-MENOPAUSE ON HRT (HORMONE REPLACEMENT THERAPY): Primary | ICD-10-CM

## 2020-12-10 PROCEDURE — 99999 PR PBB SHADOW E&M-EST. PATIENT-LVL III: CPT | Mod: PBBFAC,,,

## 2020-12-10 PROCEDURE — 96372 THER/PROPH/DIAG INJ SC/IM: CPT | Mod: PBBFAC

## 2020-12-10 PROCEDURE — 99999 PR PBB SHADOW E&M-EST. PATIENT-LVL III: ICD-10-PCS | Mod: PBBFAC,,,

## 2020-12-10 RX ADMIN — TESTOSTERONE CYPIONATE 50 MG: 200 INJECTION, SOLUTION INTRAMUSCULAR at 02:12

## 2020-12-10 RX ADMIN — ESTRADIOL VALERATE 20 MG: 20 INJECTION INTRAMUSCULAR at 02:12

## 2021-01-12 ENCOUNTER — CLINICAL SUPPORT (OUTPATIENT)
Dept: OBSTETRICS AND GYNECOLOGY | Facility: CLINIC | Age: 51
End: 2021-01-12
Payer: MEDICAID

## 2021-01-12 VITALS — BODY MASS INDEX: 52.42 KG/M2 | WEIGHT: 286.63 LBS

## 2021-01-12 DIAGNOSIS — Z79.890 HORMONE REPLACEMENT THERAPY (HRT): ICD-10-CM

## 2021-01-12 PROCEDURE — 96372 THER/PROPH/DIAG INJ SC/IM: CPT | Mod: PBBFAC

## 2021-01-12 PROCEDURE — 99999 PR PBB SHADOW E&M-EST. PATIENT-LVL III: CPT | Mod: PBBFAC,,,

## 2021-01-12 PROCEDURE — 99999 PR PBB SHADOW E&M-EST. PATIENT-LVL III: ICD-10-PCS | Mod: PBBFAC,,,

## 2021-01-12 RX ADMIN — ESTRADIOL VALERATE 20 MG: 20 INJECTION INTRAMUSCULAR at 02:01

## 2021-01-12 RX ADMIN — TESTOSTERONE CYPIONATE 50 MG: 200 INJECTION, SOLUTION INTRAMUSCULAR at 01:01

## 2021-02-09 ENCOUNTER — CLINICAL SUPPORT (OUTPATIENT)
Dept: OBSTETRICS AND GYNECOLOGY | Facility: CLINIC | Age: 51
End: 2021-02-09
Payer: MEDICAID

## 2021-02-09 VITALS — BODY MASS INDEX: 53.23 KG/M2 | WEIGHT: 291 LBS

## 2021-02-09 DIAGNOSIS — Z79.890 HORMONE REPLACEMENT THERAPY (HRT): Primary | ICD-10-CM

## 2021-02-09 PROCEDURE — 96372 THER/PROPH/DIAG INJ SC/IM: CPT | Mod: PBBFAC

## 2021-02-09 PROCEDURE — 99999 PR PBB SHADOW E&M-EST. PATIENT-LVL III: CPT | Mod: PBBFAC,,,

## 2021-02-09 PROCEDURE — 99999 PR PBB SHADOW E&M-EST. PATIENT-LVL III: ICD-10-PCS | Mod: PBBFAC,,,

## 2021-02-09 RX ADMIN — ESTRADIOL VALERATE 20 MG: 20 INJECTION INTRAMUSCULAR at 01:02

## 2021-02-09 RX ADMIN — TESTOSTERONE CYPIONATE 50 MG: 200 INJECTION, SOLUTION INTRAMUSCULAR at 01:02

## 2021-03-08 ENCOUNTER — HOSPITAL ENCOUNTER (EMERGENCY)
Facility: HOSPITAL | Age: 51
Discharge: HOME | End: 2021-03-08
Attending: INTERNAL MEDICINE
Payer: MEDICAID

## 2021-03-08 VITALS
HEART RATE: 83 BPM | DIASTOLIC BLOOD PRESSURE: 86 MMHG | TEMPERATURE: 98 F | BODY MASS INDEX: 51.71 KG/M2 | RESPIRATION RATE: 20 BRPM | OXYGEN SATURATION: 95 % | WEIGHT: 281 LBS | SYSTOLIC BLOOD PRESSURE: 164 MMHG | HEIGHT: 62 IN

## 2021-03-08 DIAGNOSIS — J01.40 ACUTE NON-RECURRENT PANSINUSITIS: Primary | ICD-10-CM

## 2021-03-08 DIAGNOSIS — R05.9 COUGH: ICD-10-CM

## 2021-03-08 LAB
CTP QC/QA: YES
SARS-COV-2 RDRP RESP QL NAA+PROBE: NEGATIVE

## 2021-03-08 PROCEDURE — U0002 COVID-19 LAB TEST NON-CDC: HCPCS | Mod: ER | Performed by: INTERNAL MEDICINE

## 2021-03-08 PROCEDURE — 94640 AIRWAY INHALATION TREATMENT: CPT | Mod: ER

## 2021-03-08 PROCEDURE — 25000242 PHARM REV CODE 250 ALT 637 W/ HCPCS: Mod: ER | Performed by: PHYSICIAN ASSISTANT

## 2021-03-08 PROCEDURE — 99285 EMERGENCY DEPT VISIT HI MDM: CPT | Mod: 25,ER

## 2021-03-08 PROCEDURE — 25000242 PHARM REV CODE 250 ALT 637 W/ HCPCS: Mod: ER | Performed by: NURSE PRACTITIONER

## 2021-03-08 RX ORDER — IPRATROPIUM BROMIDE AND ALBUTEROL SULFATE 2.5; .5 MG/3ML; MG/3ML
3 SOLUTION RESPIRATORY (INHALATION)
Status: COMPLETED | OUTPATIENT
Start: 2021-03-08 | End: 2021-03-08

## 2021-03-08 RX ORDER — AMOXICILLIN AND CLAVULANATE POTASSIUM 875; 125 MG/1; MG/1
1 TABLET, FILM COATED ORAL 2 TIMES DAILY
Qty: 14 TABLET | Refills: 0 | OUTPATIENT
Start: 2021-03-08 | End: 2021-04-18

## 2021-03-08 RX ORDER — PREDNISONE 20 MG/1
40 TABLET ORAL DAILY
Qty: 10 TABLET | Refills: 0 | Status: SHIPPED | OUTPATIENT
Start: 2021-03-08 | End: 2021-03-13

## 2021-03-08 RX ADMIN — IPRATROPIUM BROMIDE AND ALBUTEROL SULFATE 3 ML: .5; 3 SOLUTION RESPIRATORY (INHALATION) at 09:03

## 2021-03-08 RX ADMIN — IPRATROPIUM BROMIDE AND ALBUTEROL SULFATE 3 ML: .5; 3 SOLUTION RESPIRATORY (INHALATION) at 08:03

## 2021-03-12 ENCOUNTER — CLINICAL SUPPORT (OUTPATIENT)
Dept: OBSTETRICS AND GYNECOLOGY | Facility: CLINIC | Age: 51
End: 2021-03-12
Payer: MEDICAID

## 2021-03-12 VITALS — WEIGHT: 281.06 LBS | BODY MASS INDEX: 51.41 KG/M2

## 2021-03-12 DIAGNOSIS — Z79.890 HORMONE REPLACEMENT THERAPY (HRT): Primary | ICD-10-CM

## 2021-03-12 PROCEDURE — 99999 PR PBB SHADOW E&M-EST. PATIENT-LVL III: CPT | Mod: PBBFAC,,,

## 2021-03-12 PROCEDURE — 99999 PR PBB SHADOW E&M-EST. PATIENT-LVL III: ICD-10-PCS | Mod: PBBFAC,,,

## 2021-03-12 PROCEDURE — 96372 THER/PROPH/DIAG INJ SC/IM: CPT | Mod: PBBFAC

## 2021-03-12 RX ORDER — ESTRADIOL VALERATE 40 MG/ML
20 INJECTION INTRAMUSCULAR
Status: COMPLETED | OUTPATIENT
Start: 2021-03-13 | End: 2021-07-23

## 2021-03-12 RX ADMIN — TESTOSTERONE CYPIONATE 50 MG: 200 INJECTION, SOLUTION INTRAMUSCULAR at 01:03

## 2021-03-12 RX ADMIN — ESTRADIOL VALERATE 20 MG: 20 INJECTION INTRAMUSCULAR at 01:03

## 2021-03-12 RX ADMIN — ESTRADIOL VALERATE 20 MG: 40 INJECTION INTRAMUSCULAR at 03:03

## 2021-04-09 ENCOUNTER — CLINICAL SUPPORT (OUTPATIENT)
Dept: OBSTETRICS AND GYNECOLOGY | Facility: CLINIC | Age: 51
End: 2021-04-09
Payer: MEDICAID

## 2021-04-09 VITALS — BODY MASS INDEX: 51.25 KG/M2 | WEIGHT: 280.19 LBS

## 2021-04-09 DIAGNOSIS — Z79.890 HORMONE REPLACEMENT THERAPY (HRT): Primary | ICD-10-CM

## 2021-04-09 PROCEDURE — 99999 PR PBB SHADOW E&M-EST. PATIENT-LVL III: ICD-10-PCS | Mod: PBBFAC,,,

## 2021-04-09 PROCEDURE — 99999 PR PBB SHADOW E&M-EST. PATIENT-LVL III: CPT | Mod: PBBFAC,,,

## 2021-04-09 PROCEDURE — 96372 THER/PROPH/DIAG INJ SC/IM: CPT | Mod: PBBFAC

## 2021-04-09 RX ORDER — TESTOSTERONE CYPIONATE 200 MG/ML
50 INJECTION, SOLUTION INTRAMUSCULAR
Status: COMPLETED | OUTPATIENT
Start: 2021-04-09 | End: 2021-07-23

## 2021-04-09 RX ADMIN — ESTRADIOL VALERATE 20 MG: 40 INJECTION INTRAMUSCULAR at 01:04

## 2021-04-18 ENCOUNTER — HOSPITAL ENCOUNTER (EMERGENCY)
Facility: HOSPITAL | Age: 51
Discharge: HOME | End: 2021-04-18
Attending: EMERGENCY MEDICINE
Payer: MEDICAID

## 2021-04-18 VITALS
BODY MASS INDEX: 51.53 KG/M2 | HEART RATE: 77 BPM | SYSTOLIC BLOOD PRESSURE: 138 MMHG | RESPIRATION RATE: 18 BRPM | DIASTOLIC BLOOD PRESSURE: 79 MMHG | HEIGHT: 62 IN | TEMPERATURE: 98 F | OXYGEN SATURATION: 97 % | WEIGHT: 280 LBS

## 2021-04-18 DIAGNOSIS — K64.8 INTERNAL HEMORRHOID: ICD-10-CM

## 2021-04-18 DIAGNOSIS — K52.9 ENTERITIS: Primary | ICD-10-CM

## 2021-04-18 DIAGNOSIS — K76.9 LIVER LESION: ICD-10-CM

## 2021-04-18 DIAGNOSIS — R10.13 EPIGASTRIC PAIN: ICD-10-CM

## 2021-04-18 DIAGNOSIS — K64.4 HEMORRHOIDS, EXTERNAL: ICD-10-CM

## 2021-04-18 LAB
ALBUMIN SERPL-MCNC: 3.9 G/DL (ref 3.3–5.5)
ALBUMIN SERPL-MCNC: 4 G/DL (ref 3.3–5.5)
ALP SERPL-CCNC: 100 U/L (ref 42–141)
ALP SERPL-CCNC: 105 U/L (ref 42–141)
BILIRUB SERPL-MCNC: 0.4 MG/DL (ref 0.2–1.6)
BILIRUB SERPL-MCNC: 0.4 MG/DL (ref 0.2–1.6)
BUN SERPL-MCNC: 8 MG/DL (ref 7–22)
CALCIUM SERPL-MCNC: 9 MG/DL (ref 8–10.3)
CHLORIDE SERPL-SCNC: 108 MMOL/L (ref 98–108)
CREAT SERPL-MCNC: 0.6 MG/DL (ref 0.6–1.2)
CTP QC/QA: YES
FECAL OCCULT BLOOD, POC: POSITIVE
GLUCOSE SERPL-MCNC: 108 MG/DL (ref 73–118)
POC ALT (SGPT): 16 U/L (ref 10–47)
POC ALT (SGPT): 17 U/L (ref 10–47)
POC AMYLASE: 24 U/L (ref 14–97)
POC AST (SGOT): 23 U/L (ref 11–38)
POC AST (SGOT): 24 U/L (ref 11–38)
POC CARDIAC TROPONIN I: 0 NG/ML
POC GGT: 17 U/L (ref 5–65)
POC PTINR: 1 (ref 0.9–1.2)
POC PTWBT: 12.3 SEC (ref 9.7–14.3)
POC TCO2: 26 MMOL/L (ref 18–33)
POTASSIUM BLD-SCNC: 4.2 MMOL/L (ref 3.6–5.1)
PROTEIN, POC: 6.7 G/DL (ref 6.4–8.1)
PROTEIN, POC: 6.7 G/DL (ref 6.4–8.1)
SAMPLE: NORMAL
SAMPLE: NORMAL
SODIUM BLD-SCNC: 139 MMOL/L (ref 128–145)

## 2021-04-18 PROCEDURE — 82150 ASSAY OF AMYLASE: CPT | Mod: ER

## 2021-04-18 PROCEDURE — 93005 ELECTROCARDIOGRAM TRACING: CPT | Mod: ER

## 2021-04-18 PROCEDURE — 99285 EMERGENCY DEPT VISIT HI MDM: CPT | Mod: 25,ER

## 2021-04-18 PROCEDURE — 93010 ELECTROCARDIOGRAM REPORT: CPT | Mod: ,,, | Performed by: INTERNAL MEDICINE

## 2021-04-18 PROCEDURE — 25000003 PHARM REV CODE 250: Mod: ER | Performed by: NURSE PRACTITIONER

## 2021-04-18 PROCEDURE — 25500020 PHARM REV CODE 255: Mod: ER | Performed by: EMERGENCY MEDICINE

## 2021-04-18 PROCEDURE — 82270 OCCULT BLOOD FECES: CPT | Mod: ER

## 2021-04-18 PROCEDURE — 84484 ASSAY OF TROPONIN QUANT: CPT | Mod: ER

## 2021-04-18 PROCEDURE — 85025 COMPLETE CBC W/AUTO DIFF WBC: CPT | Mod: ER

## 2021-04-18 PROCEDURE — 93010 EKG 12-LEAD: ICD-10-PCS | Mod: ,,, | Performed by: INTERNAL MEDICINE

## 2021-04-18 PROCEDURE — 85610 PROTHROMBIN TIME: CPT | Mod: ER

## 2021-04-18 PROCEDURE — 96360 HYDRATION IV INFUSION INIT: CPT | Mod: 59,ER

## 2021-04-18 PROCEDURE — 80053 COMPREHEN METABOLIC PANEL: CPT | Mod: ER

## 2021-04-18 RX ORDER — ONDANSETRON 8 MG/1
8 TABLET, ORALLY DISINTEGRATING ORAL EVERY 8 HOURS PRN
Qty: 30 TABLET | Refills: 0 | Status: SHIPPED | OUTPATIENT
Start: 2021-04-18 | End: 2023-04-28

## 2021-04-18 RX ORDER — SODIUM CHLORIDE 9 MG/ML
INJECTION, SOLUTION INTRAVENOUS
Status: COMPLETED | OUTPATIENT
Start: 2021-04-18 | End: 2021-04-18

## 2021-04-18 RX ORDER — AMOXICILLIN AND CLAVULANATE POTASSIUM 875; 125 MG/1; MG/1
1 TABLET, FILM COATED ORAL 2 TIMES DAILY
Qty: 20 TABLET | Refills: 0 | Status: SHIPPED | OUTPATIENT
Start: 2021-04-18 | End: 2021-04-28

## 2021-04-18 RX ORDER — DOCUSATE SODIUM 100 MG/1
100 CAPSULE, LIQUID FILLED ORAL 2 TIMES DAILY PRN
Qty: 30 CAPSULE | Refills: 0 | Status: SHIPPED | OUTPATIENT
Start: 2021-04-18

## 2021-04-18 RX ADMIN — IOHEXOL 100 ML: 350 INJECTION, SOLUTION INTRAVENOUS at 05:04

## 2021-04-18 RX ADMIN — SODIUM CHLORIDE: 0.9 INJECTION, SOLUTION INTRAVENOUS at 05:04

## 2021-04-21 ENCOUNTER — TELEPHONE (OUTPATIENT)
Dept: TRANSPLANT | Facility: CLINIC | Age: 51
End: 2021-04-21

## 2021-04-22 ENCOUNTER — TELEPHONE (OUTPATIENT)
Dept: HEPATOLOGY | Facility: CLINIC | Age: 51
End: 2021-04-22

## 2021-04-22 NOTE — TELEPHONE ENCOUNTER
----- Message from Carlos Delgado MD sent at 7/24/2017 12:44 PM CDT -----  Please come to the office to  your signed Mammogram/ Ultrasound order.   ----- Message -----  From: Cher Lugo MA  Sent: 7/24/2017  11:05 AM  To: Carlos Delgado MD        ----- Message -----  From: Rosey Samano  Sent: 7/24/2017  11:02 AM  To: aDnny Gonzalez I Staff    Patient is needing mammogram orders. Please call at [phone] Thank you!     Patient will ambulate 500 feet without device independently for community ambulation in 1-2 weeks.

## 2021-05-02 PROBLEM — K76.9 LIVER LESION: Status: ACTIVE | Noted: 2021-05-02

## 2021-05-03 ENCOUNTER — LAB VISIT (OUTPATIENT)
Dept: LAB | Facility: HOSPITAL | Age: 51
End: 2021-05-03
Attending: INTERNAL MEDICINE
Payer: MEDICAID

## 2021-05-03 ENCOUNTER — OFFICE VISIT (OUTPATIENT)
Dept: HEPATOLOGY | Facility: CLINIC | Age: 51
End: 2021-05-03
Payer: MEDICAID

## 2021-05-03 VITALS
HEIGHT: 62 IN | DIASTOLIC BLOOD PRESSURE: 87 MMHG | RESPIRATION RATE: 20 BRPM | BODY MASS INDEX: 51.67 KG/M2 | HEART RATE: 87 BPM | SYSTOLIC BLOOD PRESSURE: 168 MMHG | OXYGEN SATURATION: 97 % | TEMPERATURE: 98 F | WEIGHT: 280.75 LBS

## 2021-05-03 DIAGNOSIS — Z12.12 ENCOUNTER FOR COLORECTAL CANCER SCREENING: ICD-10-CM

## 2021-05-03 DIAGNOSIS — Z12.11 ENCOUNTER FOR COLORECTAL CANCER SCREENING: ICD-10-CM

## 2021-05-03 DIAGNOSIS — K52.9 ENTERITIS: Primary | ICD-10-CM

## 2021-05-03 DIAGNOSIS — K76.9 LIVER LESION: ICD-10-CM

## 2021-05-03 DIAGNOSIS — Z12.31 SCREENING MAMMOGRAM, ENCOUNTER FOR: Primary | ICD-10-CM

## 2021-05-03 LAB
AFP SERPL-MCNC: 3.2 NG/ML (ref 0–8.4)
ALBUMIN SERPL BCP-MCNC: 4.1 G/DL (ref 3.5–5.2)
ALP SERPL-CCNC: 118 U/L (ref 55–135)
ALT SERPL W/O P-5'-P-CCNC: 13 U/L (ref 10–44)
ANION GAP SERPL CALC-SCNC: 8 MMOL/L (ref 8–16)
AST SERPL-CCNC: 18 U/L (ref 10–40)
BASOPHILS # BLD AUTO: 0.05 K/UL (ref 0–0.2)
BASOPHILS NFR BLD: 0.7 % (ref 0–1.9)
BILIRUB SERPL-MCNC: 0.3 MG/DL (ref 0.1–1)
BUN SERPL-MCNC: 9 MG/DL (ref 6–20)
CALCIUM SERPL-MCNC: 9.2 MG/DL (ref 8.7–10.5)
CEA SERPL-MCNC: 6.5 NG/ML (ref 0–5)
CHLORIDE SERPL-SCNC: 105 MMOL/L (ref 95–110)
CO2 SERPL-SCNC: 26 MMOL/L (ref 23–29)
CREAT SERPL-MCNC: 0.8 MG/DL (ref 0.5–1.4)
DIFFERENTIAL METHOD: NORMAL
EOSINOPHIL # BLD AUTO: 0.1 K/UL (ref 0–0.5)
EOSINOPHIL NFR BLD: 1.3 % (ref 0–8)
ERYTHROCYTE [DISTWIDTH] IN BLOOD BY AUTOMATED COUNT: 14.1 % (ref 11.5–14.5)
EST. GFR  (AFRICAN AMERICAN): >60 ML/MIN/1.73 M^2
EST. GFR  (NON AFRICAN AMERICAN): >60 ML/MIN/1.73 M^2
GLUCOSE SERPL-MCNC: 93 MG/DL (ref 70–110)
HCT VFR BLD AUTO: 43.7 % (ref 37–48.5)
HGB BLD-MCNC: 15 G/DL (ref 12–16)
IMM GRANULOCYTES # BLD AUTO: 0.02 K/UL (ref 0–0.04)
IMM GRANULOCYTES NFR BLD AUTO: 0.3 % (ref 0–0.5)
INR PPP: 0.9 (ref 0.8–1.2)
LYMPHOCYTES # BLD AUTO: 1.5 K/UL (ref 1–4.8)
LYMPHOCYTES NFR BLD: 21.3 % (ref 18–48)
MCH RBC QN AUTO: 30.9 PG (ref 27–31)
MCHC RBC AUTO-ENTMCNC: 34.3 G/DL (ref 32–36)
MCV RBC AUTO: 90 FL (ref 82–98)
MONOCYTES # BLD AUTO: 0.4 K/UL (ref 0.3–1)
MONOCYTES NFR BLD: 5.5 % (ref 4–15)
NEUTROPHILS # BLD AUTO: 5 K/UL (ref 1.8–7.7)
NEUTROPHILS NFR BLD: 70.9 % (ref 38–73)
NRBC BLD-RTO: 0 /100 WBC
PLATELET # BLD AUTO: 287 K/UL (ref 150–450)
PMV BLD AUTO: 10 FL (ref 9.2–12.9)
POTASSIUM SERPL-SCNC: 4.4 MMOL/L (ref 3.5–5.1)
PROT SERPL-MCNC: 7.4 G/DL (ref 6–8.4)
PROTHROMBIN TIME: 9.9 SEC (ref 9–12.5)
RBC # BLD AUTO: 4.85 M/UL (ref 4–5.4)
SODIUM SERPL-SCNC: 139 MMOL/L (ref 136–145)
WBC # BLD AUTO: 7.1 K/UL (ref 3.9–12.7)

## 2021-05-03 PROCEDURE — 85025 COMPLETE CBC W/AUTO DIFF WBC: CPT | Performed by: INTERNAL MEDICINE

## 2021-05-03 PROCEDURE — 80053 COMPREHEN METABOLIC PANEL: CPT | Performed by: INTERNAL MEDICINE

## 2021-05-03 PROCEDURE — 85610 PROTHROMBIN TIME: CPT | Performed by: INTERNAL MEDICINE

## 2021-05-03 PROCEDURE — 99204 OFFICE O/P NEW MOD 45 MIN: CPT | Mod: S$PBB,,, | Performed by: INTERNAL MEDICINE

## 2021-05-03 PROCEDURE — 99999 PR PBB SHADOW E&M-EST. PATIENT-LVL V: CPT | Mod: PBBFAC,,, | Performed by: INTERNAL MEDICINE

## 2021-05-03 PROCEDURE — 86304 IMMUNOASSAY TUMOR CA 125: CPT | Performed by: INTERNAL MEDICINE

## 2021-05-03 PROCEDURE — 99215 OFFICE O/P EST HI 40 MIN: CPT | Mod: PBBFAC,PN | Performed by: INTERNAL MEDICINE

## 2021-05-03 PROCEDURE — 99999 PR PBB SHADOW E&M-EST. PATIENT-LVL V: ICD-10-PCS | Mod: PBBFAC,,, | Performed by: INTERNAL MEDICINE

## 2021-05-03 PROCEDURE — 82105 ALPHA-FETOPROTEIN SERUM: CPT | Performed by: INTERNAL MEDICINE

## 2021-05-03 PROCEDURE — 36415 COLL VENOUS BLD VENIPUNCTURE: CPT | Mod: PN | Performed by: INTERNAL MEDICINE

## 2021-05-03 PROCEDURE — 82378 CARCINOEMBRYONIC ANTIGEN: CPT | Performed by: INTERNAL MEDICINE

## 2021-05-03 PROCEDURE — 86301 IMMUNOASSAY TUMOR CA 19-9: CPT | Performed by: INTERNAL MEDICINE

## 2021-05-03 PROCEDURE — 99204 PR OFFICE/OUTPT VISIT, NEW, LEVL IV, 45-59 MIN: ICD-10-PCS | Mod: S$PBB,,, | Performed by: INTERNAL MEDICINE

## 2021-05-03 RX ORDER — SPIRONOLACTONE 25 MG/1
TABLET ORAL
COMMUNITY

## 2021-05-03 RX ORDER — MONTELUKAST SODIUM 10 MG/1
TABLET ORAL
COMMUNITY

## 2021-05-03 RX ORDER — HYDROCODONE BITARTRATE AND ACETAMINOPHEN 10; 325 MG/1; MG/1
1 TABLET ORAL
COMMUNITY

## 2021-05-03 RX ORDER — ROSUVASTATIN CALCIUM 10 MG/1
10 TABLET, COATED ORAL DAILY
COMMUNITY

## 2021-05-03 RX ORDER — POTASSIUM CHLORIDE 750 MG/1
CAPSULE, EXTENDED RELEASE ORAL
COMMUNITY

## 2021-05-03 RX ORDER — NYSTATIN 100000 [USP'U]/ML
SUSPENSION ORAL
COMMUNITY

## 2021-05-04 ENCOUNTER — TELEPHONE (OUTPATIENT)
Dept: HEPATOLOGY | Facility: CLINIC | Age: 51
End: 2021-05-04

## 2021-05-04 LAB
CANCER AG125 SERPL-ACNC: 14 U/ML (ref 0–30)
CANCER AG19-9 SERPL-ACNC: <2 U/ML (ref 2–40)

## 2021-05-11 ENCOUNTER — CLINICAL SUPPORT (OUTPATIENT)
Dept: OBSTETRICS AND GYNECOLOGY | Facility: CLINIC | Age: 51
End: 2021-05-11
Payer: MEDICAID

## 2021-05-11 VITALS — BODY MASS INDEX: 51.61 KG/M2 | WEIGHT: 282.19 LBS

## 2021-05-11 DIAGNOSIS — Z79.890 HORMONE REPLACEMENT THERAPY (HRT): Primary | ICD-10-CM

## 2021-05-11 PROCEDURE — 99999 PR PBB SHADOW E&M-EST. PATIENT-LVL III: CPT | Mod: PBBFAC,,,

## 2021-05-11 PROCEDURE — 96372 THER/PROPH/DIAG INJ SC/IM: CPT | Mod: PBBFAC

## 2021-05-11 PROCEDURE — 99999 PR PBB SHADOW E&M-EST. PATIENT-LVL III: ICD-10-PCS | Mod: PBBFAC,,,

## 2021-05-11 RX ORDER — TESTOSTERONE CYPIONATE 200 MG/ML
50 INJECTION, SOLUTION INTRAMUSCULAR
Status: SHIPPED | OUTPATIENT
Start: 2021-05-11 | End: 2021-09-28

## 2021-05-11 RX ADMIN — ESTRADIOL VALERATE 20 MG: 40 INJECTION INTRAMUSCULAR at 01:05

## 2021-05-11 RX ADMIN — TESTOSTERONE CYPIONATE 50 MG: 200 INJECTION, SOLUTION INTRAMUSCULAR at 01:05

## 2021-05-14 ENCOUNTER — HOSPITAL ENCOUNTER (OUTPATIENT)
Dept: RADIOLOGY | Facility: HOSPITAL | Age: 51
Discharge: HOME | End: 2021-05-14
Attending: INTERNAL MEDICINE
Payer: MEDICAID

## 2021-05-14 ENCOUNTER — TELEPHONE (OUTPATIENT)
Dept: HEPATOLOGY | Facility: CLINIC | Age: 51
End: 2021-05-14

## 2021-05-14 DIAGNOSIS — K76.9 LIVER LESION: ICD-10-CM

## 2021-05-14 PROCEDURE — 74183 MRI ABD W/O CNTR FLWD CNTR: CPT | Mod: TC

## 2021-05-14 PROCEDURE — A9585 GADOBUTROL INJECTION: HCPCS | Performed by: INTERNAL MEDICINE

## 2021-05-14 PROCEDURE — 25500020 PHARM REV CODE 255: Performed by: INTERNAL MEDICINE

## 2021-05-14 PROCEDURE — 74183 MRI ABD W/O CNTR FLWD CNTR: CPT | Mod: 26,,, | Performed by: RADIOLOGY

## 2021-05-14 PROCEDURE — 74183 MRI ABDOMEN W WO CONTRAST: ICD-10-PCS | Mod: 26,,, | Performed by: RADIOLOGY

## 2021-05-14 RX ORDER — GADOBUTROL 604.72 MG/ML
10 INJECTION INTRAVENOUS
Status: COMPLETED | OUTPATIENT
Start: 2021-05-14 | End: 2021-05-14

## 2021-05-14 RX ADMIN — GADOBUTROL 10 ML: 604.72 INJECTION INTRAVENOUS at 01:05

## 2021-05-18 ENCOUNTER — OFFICE VISIT (OUTPATIENT)
Dept: OBSTETRICS AND GYNECOLOGY | Facility: CLINIC | Age: 51
End: 2021-05-18
Payer: MEDICAID

## 2021-05-18 VITALS
DIASTOLIC BLOOD PRESSURE: 77 MMHG | SYSTOLIC BLOOD PRESSURE: 144 MMHG | HEIGHT: 62 IN | BODY MASS INDEX: 51.99 KG/M2 | WEIGHT: 282.5 LBS

## 2021-05-18 DIAGNOSIS — Z90.711 S/P PARTIAL HYSTERECTOMY: ICD-10-CM

## 2021-05-18 DIAGNOSIS — Z01.419 GYNECOLOGIC EXAM NORMAL: Primary | ICD-10-CM

## 2021-05-18 PROCEDURE — 99396 PR PREVENTIVE VISIT,EST,40-64: ICD-10-PCS | Mod: S$PBB,,, | Performed by: OBSTETRICS & GYNECOLOGY

## 2021-05-18 PROCEDURE — 99396 PREV VISIT EST AGE 40-64: CPT | Mod: S$PBB,,, | Performed by: OBSTETRICS & GYNECOLOGY

## 2021-05-18 PROCEDURE — 99214 OFFICE O/P EST MOD 30 MIN: CPT | Mod: PBBFAC | Performed by: OBSTETRICS & GYNECOLOGY

## 2021-05-18 PROCEDURE — 99999 PR PBB SHADOW E&M-EST. PATIENT-LVL IV: ICD-10-PCS | Mod: PBBFAC,,, | Performed by: OBSTETRICS & GYNECOLOGY

## 2021-05-18 PROCEDURE — 99999 PR PBB SHADOW E&M-EST. PATIENT-LVL IV: CPT | Mod: PBBFAC,,, | Performed by: OBSTETRICS & GYNECOLOGY

## 2021-05-18 PROCEDURE — 87624 HPV HI-RISK TYP POOLED RSLT: CPT | Performed by: OBSTETRICS & GYNECOLOGY

## 2021-05-18 PROCEDURE — 88175 CYTOPATH C/V AUTO FLUID REDO: CPT | Performed by: OBSTETRICS & GYNECOLOGY

## 2021-05-20 DIAGNOSIS — Z01.818 PRE-OP TESTING: ICD-10-CM

## 2021-05-20 DIAGNOSIS — Z12.11 SPECIAL SCREENING FOR MALIGNANT NEOPLASMS, COLON: Primary | ICD-10-CM

## 2021-05-20 RX ORDER — SODIUM, POTASSIUM,MAG SULFATES 17.5-3.13G
1 SOLUTION, RECONSTITUTED, ORAL ORAL DAILY
Qty: 1 KIT | Refills: 0 | Status: SHIPPED | OUTPATIENT
Start: 2021-05-20 | End: 2021-05-22

## 2021-05-21 LAB
FINAL PATHOLOGIC DIAGNOSIS: NORMAL
Lab: NORMAL

## 2021-05-25 LAB
HPV HR 12 DNA SPEC QL NAA+PROBE: NEGATIVE
HPV16 AG SPEC QL: NEGATIVE
HPV18 DNA SPEC QL NAA+PROBE: NEGATIVE

## 2021-05-26 ENCOUNTER — TELEPHONE (OUTPATIENT)
Dept: OBSTETRICS AND GYNECOLOGY | Facility: CLINIC | Age: 51
End: 2021-05-26

## 2021-05-31 ENCOUNTER — TELEPHONE (OUTPATIENT)
Dept: HEPATOLOGY | Facility: CLINIC | Age: 51
End: 2021-05-31

## 2021-05-31 ENCOUNTER — OFFICE VISIT (OUTPATIENT)
Dept: HEPATOLOGY | Facility: CLINIC | Age: 51
End: 2021-05-31
Payer: MEDICAID

## 2021-05-31 VITALS
BODY MASS INDEX: 51.4 KG/M2 | WEIGHT: 279.31 LBS | HEART RATE: 84 BPM | TEMPERATURE: 98 F | HEIGHT: 62 IN | OXYGEN SATURATION: 98 % | RESPIRATION RATE: 18 BRPM | SYSTOLIC BLOOD PRESSURE: 146 MMHG | DIASTOLIC BLOOD PRESSURE: 86 MMHG

## 2021-05-31 DIAGNOSIS — K76.89 HEPATIC CYST: Primary | ICD-10-CM

## 2021-05-31 DIAGNOSIS — K76.0 FATTY LIVER: ICD-10-CM

## 2021-05-31 PROCEDURE — 99999 PR PBB SHADOW E&M-EST. PATIENT-LVL V: ICD-10-PCS | Mod: PBBFAC,,, | Performed by: INTERNAL MEDICINE

## 2021-05-31 PROCEDURE — 99213 OFFICE O/P EST LOW 20 MIN: CPT | Mod: S$PBB,,, | Performed by: INTERNAL MEDICINE

## 2021-05-31 PROCEDURE — 99999 PR PBB SHADOW E&M-EST. PATIENT-LVL V: CPT | Mod: PBBFAC,,, | Performed by: INTERNAL MEDICINE

## 2021-05-31 PROCEDURE — 99215 OFFICE O/P EST HI 40 MIN: CPT | Mod: PBBFAC,PN | Performed by: INTERNAL MEDICINE

## 2021-05-31 PROCEDURE — 99213 PR OFFICE/OUTPT VISIT, EST, LEVL III, 20-29 MIN: ICD-10-PCS | Mod: S$PBB,,, | Performed by: INTERNAL MEDICINE

## 2021-05-31 RX ORDER — FLUCONAZOLE 150 MG/1
TABLET ORAL
COMMUNITY
Start: 2021-01-08 | End: 2022-06-20

## 2021-05-31 RX ORDER — FLUTICASONE PROPIONATE AND SALMETEROL 50; 250 UG/1; UG/1
1 POWDER RESPIRATORY (INHALATION) 2 TIMES DAILY
COMMUNITY
Start: 2021-05-05

## 2021-05-31 RX ORDER — NEOMYCIN SULFATE, POLYMYXIN B SULFATE, HYDROCORTISONE 3.5; 10000; 1 MG/ML; [USP'U]/ML; MG/ML
SOLUTION/ DROPS AURICULAR (OTIC)
COMMUNITY
Start: 2020-12-04

## 2021-05-31 RX ORDER — NITROGLYCERIN 0.4 MG/1
TABLET SUBLINGUAL
COMMUNITY
Start: 2021-05-17

## 2021-05-31 RX ORDER — MELOXICAM 15 MG/1
15 TABLET ORAL DAILY PRN
COMMUNITY
Start: 2021-05-05

## 2021-05-31 RX ORDER — METOPROLOL SUCCINATE 25 MG/1
25 TABLET, EXTENDED RELEASE ORAL DAILY
COMMUNITY
Start: 2021-05-08

## 2021-05-31 RX ORDER — TIOTROPIUM BROMIDE 18 UG/1
18 CAPSULE ORAL; RESPIRATORY (INHALATION) DAILY
COMMUNITY

## 2021-06-06 ENCOUNTER — LAB VISIT (OUTPATIENT)
Dept: SPORTS MEDICINE | Facility: CLINIC | Age: 51
End: 2021-06-06
Payer: MEDICAID

## 2021-06-06 DIAGNOSIS — Z01.818 PRE-OP TESTING: ICD-10-CM

## 2021-06-06 PROCEDURE — U0005 INFEC AGEN DETEC AMPLI PROBE: HCPCS | Performed by: FAMILY MEDICINE

## 2021-06-06 PROCEDURE — U0003 INFECTIOUS AGENT DETECTION BY NUCLEIC ACID (DNA OR RNA); SEVERE ACUTE RESPIRATORY SYNDROME CORONAVIRUS 2 (SARS-COV-2) (CORONAVIRUS DISEASE [COVID-19]), AMPLIFIED PROBE TECHNIQUE, MAKING USE OF HIGH THROUGHPUT TECHNOLOGIES AS DESCRIBED BY CMS-2020-01-R: HCPCS | Performed by: FAMILY MEDICINE

## 2021-06-07 LAB — SARS-COV-2 RNA RESP QL NAA+PROBE: NOT DETECTED

## 2021-06-08 ENCOUNTER — HOSPITAL ENCOUNTER (OUTPATIENT)
Dept: RADIOLOGY | Facility: HOSPITAL | Age: 51
Discharge: HOME | End: 2021-06-08
Attending: OBSTETRICS & GYNECOLOGY
Payer: MEDICAID

## 2021-06-08 ENCOUNTER — ANESTHESIA EVENT (OUTPATIENT)
Dept: ENDOSCOPY | Facility: HOSPITAL | Age: 51
End: 2021-06-08
Payer: MEDICAID

## 2021-06-08 ENCOUNTER — CLINICAL SUPPORT (OUTPATIENT)
Dept: OBSTETRICS AND GYNECOLOGY | Facility: CLINIC | Age: 51
End: 2021-06-08
Payer: MEDICAID

## 2021-06-08 VITALS — WEIGHT: 278 LBS | BODY MASS INDEX: 50.85 KG/M2

## 2021-06-08 VITALS — WEIGHT: 279.31 LBS | HEIGHT: 62 IN | BODY MASS INDEX: 51.4 KG/M2

## 2021-06-08 DIAGNOSIS — Z79.890 HORMONE REPLACEMENT THERAPY (HRT): Primary | ICD-10-CM

## 2021-06-08 DIAGNOSIS — Z12.31 SCREENING MAMMOGRAM, ENCOUNTER FOR: ICD-10-CM

## 2021-06-08 PROCEDURE — 77067 MAMMO DIGITAL SCREENING BILAT WITH TOMO: ICD-10-PCS | Mod: 26,,, | Performed by: RADIOLOGY

## 2021-06-08 PROCEDURE — 77067 SCR MAMMO BI INCL CAD: CPT | Mod: 26,,, | Performed by: RADIOLOGY

## 2021-06-08 PROCEDURE — 77063 BREAST TOMOSYNTHESIS BI: CPT | Mod: 26,,, | Performed by: RADIOLOGY

## 2021-06-08 PROCEDURE — 77063 MAMMO DIGITAL SCREENING BILAT WITH TOMO: ICD-10-PCS | Mod: 26,,, | Performed by: RADIOLOGY

## 2021-06-08 PROCEDURE — 99999 PR PBB SHADOW E&M-EST. PATIENT-LVL III: CPT | Mod: PBBFAC,,,

## 2021-06-08 PROCEDURE — 99999 PR PBB SHADOW E&M-EST. PATIENT-LVL III: ICD-10-PCS | Mod: PBBFAC,,,

## 2021-06-08 PROCEDURE — 77067 SCR MAMMO BI INCL CAD: CPT | Mod: TC

## 2021-06-08 PROCEDURE — 96372 THER/PROPH/DIAG INJ SC/IM: CPT | Mod: PBBFAC

## 2021-06-08 RX ORDER — LIDOCAINE HYDROCHLORIDE 10 MG/ML
1 INJECTION, SOLUTION EPIDURAL; INFILTRATION; INTRACAUDAL; PERINEURAL ONCE
Status: CANCELLED | OUTPATIENT
Start: 2021-06-08 | End: 2021-06-08

## 2021-06-08 RX ADMIN — TESTOSTERONE CYPIONATE 50 MG: 200 INJECTION, SOLUTION INTRAMUSCULAR at 11:06

## 2021-06-08 RX ADMIN — ESTRADIOL VALERATE 20 MG: 40 INJECTION INTRAMUSCULAR at 11:06

## 2021-06-09 ENCOUNTER — ANESTHESIA (OUTPATIENT)
Dept: ENDOSCOPY | Facility: HOSPITAL | Age: 51
End: 2021-06-09
Payer: MEDICAID

## 2021-06-09 ENCOUNTER — HOSPITAL ENCOUNTER (OUTPATIENT)
Facility: HOSPITAL | Age: 51
Discharge: HOME | End: 2021-06-09
Attending: INTERNAL MEDICINE | Admitting: INTERNAL MEDICINE
Payer: MEDICAID

## 2021-06-09 VITALS
RESPIRATION RATE: 20 BRPM | SYSTOLIC BLOOD PRESSURE: 136 MMHG | DIASTOLIC BLOOD PRESSURE: 70 MMHG | HEART RATE: 72 BPM | TEMPERATURE: 98 F | OXYGEN SATURATION: 100 %

## 2021-06-09 DIAGNOSIS — K76.9 LIVER LESION: ICD-10-CM

## 2021-06-09 DIAGNOSIS — K64.9 HEMORRHOIDS, UNSPECIFIED HEMORRHOID TYPE: Primary | ICD-10-CM

## 2021-06-09 PROCEDURE — 37000008 HC ANESTHESIA 1ST 15 MINUTES: Performed by: INTERNAL MEDICINE

## 2021-06-09 PROCEDURE — 25000003 PHARM REV CODE 250: Performed by: ANESTHESIOLOGY

## 2021-06-09 PROCEDURE — 25000003 PHARM REV CODE 250: Performed by: NURSE ANESTHETIST, CERTIFIED REGISTERED

## 2021-06-09 PROCEDURE — D9220A PRA ANESTHESIA: ICD-10-PCS | Mod: CRNA,,, | Performed by: NURSE ANESTHETIST, CERTIFIED REGISTERED

## 2021-06-09 PROCEDURE — 45378 DIAGNOSTIC COLONOSCOPY: CPT | Performed by: INTERNAL MEDICINE

## 2021-06-09 PROCEDURE — D9220A PRA ANESTHESIA: ICD-10-PCS | Mod: ANES,,, | Performed by: ANESTHESIOLOGY

## 2021-06-09 PROCEDURE — 45378 DIAGNOSTIC COLONOSCOPY: CPT | Mod: ,,, | Performed by: INTERNAL MEDICINE

## 2021-06-09 PROCEDURE — 00811 ANES LWR INTST NDSC NOS: CPT | Performed by: INTERNAL MEDICINE

## 2021-06-09 PROCEDURE — D9220A PRA ANESTHESIA: Mod: CRNA,,, | Performed by: NURSE ANESTHETIST, CERTIFIED REGISTERED

## 2021-06-09 PROCEDURE — G0121 COLON CA SCRN NOT HI RSK IND: HCPCS | Performed by: INTERNAL MEDICINE

## 2021-06-09 PROCEDURE — 37000009 HC ANESTHESIA EA ADD 15 MINS: Performed by: INTERNAL MEDICINE

## 2021-06-09 PROCEDURE — 63600175 PHARM REV CODE 636 W HCPCS: Performed by: NURSE ANESTHETIST, CERTIFIED REGISTERED

## 2021-06-09 PROCEDURE — D9220A PRA ANESTHESIA: Mod: ANES,,, | Performed by: ANESTHESIOLOGY

## 2021-06-09 PROCEDURE — 45378 PR COLONOSCOPY,DIAGNOSTIC: ICD-10-PCS | Mod: ,,, | Performed by: INTERNAL MEDICINE

## 2021-06-09 RX ORDER — PROPOFOL 10 MG/ML
VIAL (ML) INTRAVENOUS
Status: DISCONTINUED | OUTPATIENT
Start: 2021-06-09 | End: 2021-06-09

## 2021-06-09 RX ORDER — LIDOCAINE HYDROCHLORIDE 20 MG/ML
INJECTION INTRAVENOUS
Status: DISCONTINUED | OUTPATIENT
Start: 2021-06-09 | End: 2021-06-09

## 2021-06-09 RX ORDER — SODIUM CHLORIDE 9 MG/ML
INJECTION, SOLUTION INTRAVENOUS CONTINUOUS
Status: DISCONTINUED | OUTPATIENT
Start: 2021-06-09 | End: 2021-06-09 | Stop reason: HOSPADM

## 2021-06-09 RX ADMIN — PROPOFOL 50 MG: 10 INJECTION, EMULSION INTRAVENOUS at 02:06

## 2021-06-09 RX ADMIN — PROPOFOL 30 MG: 10 INJECTION, EMULSION INTRAVENOUS at 02:06

## 2021-06-09 RX ADMIN — PROPOFOL 40 MG: 10 INJECTION, EMULSION INTRAVENOUS at 02:06

## 2021-06-09 RX ADMIN — SODIUM CHLORIDE: 0.9 INJECTION, SOLUTION INTRAVENOUS at 01:06

## 2021-06-09 RX ADMIN — Medication 60 MG: at 02:06

## 2021-06-09 RX ADMIN — PROPOFOL 80 MG: 10 INJECTION, EMULSION INTRAVENOUS at 02:06

## 2021-06-11 ENCOUNTER — TELEPHONE (OUTPATIENT)
Dept: OBSTETRICS AND GYNECOLOGY | Facility: CLINIC | Age: 51
End: 2021-06-11

## 2021-07-23 ENCOUNTER — CLINICAL SUPPORT (OUTPATIENT)
Dept: OBSTETRICS AND GYNECOLOGY | Facility: CLINIC | Age: 51
End: 2021-07-23
Payer: MEDICAID

## 2021-07-23 VITALS — BODY MASS INDEX: 51.72 KG/M2 | WEIGHT: 281.06 LBS | HEIGHT: 62 IN

## 2021-07-23 DIAGNOSIS — Z79.890 HORMONE REPLACEMENT THERAPY (HRT): Primary | ICD-10-CM

## 2021-07-23 PROCEDURE — 99999 PR PBB SHADOW E&M-EST. PATIENT-LVL III: CPT | Mod: PBBFAC,,,

## 2021-07-23 PROCEDURE — 99999 PR PBB SHADOW E&M-EST. PATIENT-LVL III: ICD-10-PCS | Mod: PBBFAC,,,

## 2021-07-23 PROCEDURE — 96372 THER/PROPH/DIAG INJ SC/IM: CPT | Mod: PBBFAC

## 2021-07-23 RX ADMIN — ESTRADIOL VALERATE 20 MG: 40 INJECTION INTRAMUSCULAR at 01:07

## 2021-07-23 RX ADMIN — TESTOSTERONE CYPIONATE 50 MG: 200 INJECTION, SOLUTION INTRAMUSCULAR at 01:07

## 2021-08-20 ENCOUNTER — CLINICAL SUPPORT (OUTPATIENT)
Dept: OBSTETRICS AND GYNECOLOGY | Facility: CLINIC | Age: 51
End: 2021-08-20
Payer: MEDICAID

## 2021-08-20 DIAGNOSIS — Z79.890 HORMONE REPLACEMENT THERAPY (HRT): Primary | ICD-10-CM

## 2021-08-20 PROCEDURE — 96372 THER/PROPH/DIAG INJ SC/IM: CPT | Mod: PBBFAC

## 2021-08-20 RX ORDER — ESTRADIOL VALERATE 20 MG/ML
20 INJECTION INTRAMUSCULAR
Status: COMPLETED | OUTPATIENT
Start: 2021-08-20 | End: 2021-08-20

## 2021-08-20 RX ADMIN — ESTRADIOL VALERATE 20 MG: 20 INJECTION, SOLUTION INTRAMUSCULAR at 02:08

## 2021-08-20 RX ADMIN — TESTOSTERONE CYPIONATE 50 MG: 200 INJECTION, SOLUTION INTRAMUSCULAR at 02:08

## 2021-09-16 ENCOUNTER — CLINICAL SUPPORT (OUTPATIENT)
Dept: OBSTETRICS AND GYNECOLOGY | Facility: CLINIC | Age: 51
End: 2021-09-16
Payer: MEDICAID

## 2021-09-16 VITALS — WEIGHT: 273.69 LBS | BODY MASS INDEX: 50.06 KG/M2

## 2021-09-16 DIAGNOSIS — Z79.890 HORMONE REPLACEMENT THERAPY (HRT): Primary | ICD-10-CM

## 2021-09-16 PROCEDURE — 96372 THER/PROPH/DIAG INJ SC/IM: CPT | Mod: PBBFAC

## 2021-09-16 PROCEDURE — 99999 PR PBB SHADOW E&M-EST. PATIENT-LVL I: CPT | Mod: PBBFAC,,,

## 2021-09-16 PROCEDURE — 99999 PR PBB SHADOW E&M-EST. PATIENT-LVL I: ICD-10-PCS | Mod: PBBFAC,,,

## 2021-09-16 RX ORDER — ESTRADIOL VALERATE 40 MG/ML
20 INJECTION INTRAMUSCULAR
Status: SHIPPED | OUTPATIENT
Start: 2021-09-16

## 2021-09-16 RX ADMIN — ESTRADIOL VALERATE 20 MG: 40 INJECTION, SOLUTION INTRAMUSCULAR at 02:09

## 2021-09-16 RX ADMIN — TESTOSTERONE CYPIONATE 50 MG: 200 INJECTION, SOLUTION INTRAMUSCULAR at 02:09

## 2021-10-13 ENCOUNTER — OFFICE VISIT (OUTPATIENT)
Dept: OBSTETRICS AND GYNECOLOGY | Facility: CLINIC | Age: 51
End: 2021-10-13
Payer: MEDICAID

## 2021-10-13 VITALS
BODY MASS INDEX: 49.88 KG/M2 | DIASTOLIC BLOOD PRESSURE: 90 MMHG | SYSTOLIC BLOOD PRESSURE: 138 MMHG | HEIGHT: 62 IN | WEIGHT: 271.06 LBS

## 2021-10-13 DIAGNOSIS — Z79.890 HORMONE REPLACEMENT THERAPY (HRT): ICD-10-CM

## 2021-10-13 DIAGNOSIS — S39.83XA PELVIC STRADDLE INJURY, INITIAL ENCOUNTER: Primary | ICD-10-CM

## 2021-10-13 PROCEDURE — 99999 PR PBB SHADOW E&M-EST. PATIENT-LVL IV: CPT | Mod: PBBFAC,,, | Performed by: OBSTETRICS & GYNECOLOGY

## 2021-10-13 PROCEDURE — 99213 PR OFFICE/OUTPT VISIT, EST, LEVL III, 20-29 MIN: ICD-10-PCS | Mod: S$PBB,,, | Performed by: OBSTETRICS & GYNECOLOGY

## 2021-10-13 PROCEDURE — 99213 OFFICE O/P EST LOW 20 MIN: CPT | Mod: S$PBB,,, | Performed by: OBSTETRICS & GYNECOLOGY

## 2021-10-13 PROCEDURE — 99999 PR PBB SHADOW E&M-EST. PATIENT-LVL IV: ICD-10-PCS | Mod: PBBFAC,,, | Performed by: OBSTETRICS & GYNECOLOGY

## 2021-10-13 PROCEDURE — 99214 OFFICE O/P EST MOD 30 MIN: CPT | Mod: PBBFAC | Performed by: OBSTETRICS & GYNECOLOGY

## 2021-10-13 RX ORDER — ROSUVASTATIN CALCIUM 20 MG/1
1 TABLET, COATED ORAL NIGHTLY
COMMUNITY
Start: 2021-01-21 | End: 2022-01-21

## 2021-10-13 RX ORDER — POTASSIUM CHLORIDE 750 MG/1
10 CAPSULE, EXTENDED RELEASE ORAL
COMMUNITY
Start: 2021-06-23

## 2021-10-13 RX ORDER — SERTRALINE HYDROCHLORIDE 100 MG/1
100 TABLET, FILM COATED ORAL DAILY
COMMUNITY
Start: 2021-07-09

## 2021-10-13 RX ORDER — IPRATROPIUM BROMIDE AND ALBUTEROL SULFATE 2.5; .5 MG/3ML; MG/3ML
SOLUTION RESPIRATORY (INHALATION)
COMMUNITY
Start: 2021-08-08

## 2021-10-13 RX ORDER — HYDROCHLOROTHIAZIDE 25 MG/1
25 TABLET ORAL
COMMUNITY
Start: 2021-07-19

## 2021-10-13 RX ORDER — SPIRONOLACTONE 25 MG/1
1 TABLET ORAL EVERY MORNING
COMMUNITY
Start: 2021-01-21 | End: 2022-01-21

## 2021-10-13 RX ORDER — ALPRAZOLAM 1 MG/1
1 TABLET ORAL 2 TIMES DAILY
COMMUNITY
Start: 2021-10-03

## 2021-10-13 RX ORDER — IPRATROPIUM BROMIDE AND ALBUTEROL SULFATE 2.5; .5 MG/3ML; MG/3ML
3 SOLUTION RESPIRATORY (INHALATION)
COMMUNITY
Start: 2021-07-09

## 2021-10-13 RX ORDER — HYDROCHLOROTHIAZIDE 25 MG/1
25 TABLET ORAL DAILY
COMMUNITY
Start: 2021-07-19

## 2021-10-13 RX ORDER — POTASSIUM CHLORIDE 750 MG/1
10 TABLET, EXTENDED RELEASE ORAL DAILY
COMMUNITY
Start: 2021-08-04

## 2022-05-20 DIAGNOSIS — R79.89 ELEVATED LIVER FUNCTION TESTS: Primary | ICD-10-CM

## 2022-05-24 ENCOUNTER — TELEPHONE (OUTPATIENT)
Dept: HEPATOLOGY | Facility: CLINIC | Age: 52
End: 2022-05-24
Payer: MEDICAID

## 2022-05-24 DIAGNOSIS — K76.0 FATTY LIVER: Primary | ICD-10-CM

## 2022-05-24 NOTE — TELEPHONE ENCOUNTER
Called  To patient that her [facility] lab orders are ready and that she can do them as soon as the Monday prior to her appt Wednesday 6/22 and that they are non fasting

## 2022-06-03 LAB
ALBUMIN SERPL-MCNC: 4.2 G/DL (ref 3.6–5.1)
ALBUMIN/GLOB SERPL: 1.9 (CALC) (ref 1–2.5)
ALP SERPL-CCNC: 79 U/L (ref 37–153)
ALT SERPL-CCNC: 23 U/L (ref 6–29)
AST SERPL-CCNC: 29 U/L (ref 10–35)
BASOPHILS # BLD AUTO: 49 CELLS/UL (ref 0–200)
BASOPHILS NFR BLD AUTO: 1 %
BILIRUB SERPL-MCNC: 0.3 MG/DL (ref 0.2–1.2)
BUN SERPL-MCNC: 14 MG/DL (ref 7–25)
BUN/CREAT SERPL: NORMAL (CALC) (ref 6–22)
CALCIUM SERPL-MCNC: 9.4 MG/DL (ref 8.6–10.4)
CHLORIDE SERPL-SCNC: 106 MMOL/L (ref 98–110)
CO2 SERPL-SCNC: 32 MMOL/L (ref 20–32)
CREAT SERPL-MCNC: 0.73 MG/DL (ref 0.5–1.05)
EOSINOPHIL # BLD AUTO: 69 CELLS/UL (ref 15–500)
EOSINOPHIL NFR BLD AUTO: 1.4 %
ERYTHROCYTE [DISTWIDTH] IN BLOOD BY AUTOMATED COUNT: 14.3 % (ref 11–15)
GLOBULIN SER CALC-MCNC: 2.2 G/DL (CALC) (ref 1.9–3.7)
GLUCOSE SERPL-MCNC: 84 MG/DL (ref 65–99)
HCT VFR BLD AUTO: 43.1 % (ref 35–45)
HGB BLD-MCNC: 13.8 G/DL (ref 11.7–15.5)
INR PPP: 0.9
LYMPHOCYTES # BLD AUTO: 882 CELLS/UL (ref 850–3900)
LYMPHOCYTES NFR BLD AUTO: 18 %
MCH RBC QN AUTO: 30.4 PG (ref 27–33)
MCHC RBC AUTO-ENTMCNC: 32 G/DL (ref 32–36)
MCV RBC AUTO: 94.9 FL (ref 80–100)
MONOCYTES # BLD AUTO: 441 CELLS/UL (ref 200–950)
MONOCYTES NFR BLD AUTO: 9 %
NEUTROPHILS # BLD AUTO: 3459 CELLS/UL (ref 1500–7800)
NEUTROPHILS NFR BLD AUTO: 70.6 %
PLATELET # BLD AUTO: 202 THOUSAND/UL (ref 140–400)
PMV BLD REES-ECKER: 10.9 FL (ref 7.5–12.5)
POTASSIUM SERPL-SCNC: 4.2 MMOL/L (ref 3.5–5.3)
PROT SERPL-MCNC: 6.4 G/DL (ref 6.1–8.1)
PROTHROMBIN TIME: 9.7 SEC (ref 9–11.5)
RBC # BLD AUTO: 4.54 MILLION/UL (ref 3.8–5.1)
SODIUM SERPL-SCNC: 143 MMOL/L (ref 135–146)
WBC # BLD AUTO: 4.9 THOUSAND/UL (ref 3.8–10.8)

## 2022-06-20 ENCOUNTER — OFFICE VISIT (OUTPATIENT)
Dept: OBSTETRICS AND GYNECOLOGY | Facility: CLINIC | Age: 52
End: 2022-06-20
Payer: MEDICAID

## 2022-06-20 VITALS
SYSTOLIC BLOOD PRESSURE: 130 MMHG | DIASTOLIC BLOOD PRESSURE: 90 MMHG | BODY MASS INDEX: 51.05 KG/M2 | HEIGHT: 62 IN | WEIGHT: 277.44 LBS

## 2022-06-20 DIAGNOSIS — Z12.31 SCREENING MAMMOGRAM FOR HIGH-RISK PATIENT: ICD-10-CM

## 2022-06-20 DIAGNOSIS — N90.89 VULVAR IRRITATION: ICD-10-CM

## 2022-06-20 DIAGNOSIS — Z01.419 WELL WOMAN EXAM WITH ROUTINE GYNECOLOGICAL EXAM: Primary | ICD-10-CM

## 2022-06-20 DIAGNOSIS — B37.31 VAGINAL YEAST INFECTION: ICD-10-CM

## 2022-06-20 PROBLEM — N89.8 VAGINAL ODOR: Status: RESOLVED | Noted: 2018-08-16 | Resolved: 2022-06-20

## 2022-06-20 PROCEDURE — 3075F SYST BP GE 130 - 139MM HG: CPT | Mod: CPTII,,, | Performed by: OBSTETRICS & GYNECOLOGY

## 2022-06-20 PROCEDURE — 99396 PREV VISIT EST AGE 40-64: CPT | Mod: S$PBB,,, | Performed by: OBSTETRICS & GYNECOLOGY

## 2022-06-20 PROCEDURE — 3008F BODY MASS INDEX DOCD: CPT | Mod: CPTII,,, | Performed by: OBSTETRICS & GYNECOLOGY

## 2022-06-20 PROCEDURE — 3080F PR MOST RECENT DIASTOLIC BLOOD PRESSURE >= 90 MM HG: ICD-10-PCS | Mod: CPTII,,, | Performed by: OBSTETRICS & GYNECOLOGY

## 2022-06-20 PROCEDURE — 99999 PR PBB SHADOW E&M-EST. PATIENT-LVL IV: ICD-10-PCS | Mod: PBBFAC,,, | Performed by: OBSTETRICS & GYNECOLOGY

## 2022-06-20 PROCEDURE — 1160F PR REVIEW ALL MEDS BY PRESCRIBER/CLIN PHARMACIST DOCUMENTED: ICD-10-PCS | Mod: CPTII,,, | Performed by: OBSTETRICS & GYNECOLOGY

## 2022-06-20 PROCEDURE — 1160F RVW MEDS BY RX/DR IN RCRD: CPT | Mod: CPTII,,, | Performed by: OBSTETRICS & GYNECOLOGY

## 2022-06-20 PROCEDURE — 1159F PR MEDICATION LIST DOCUMENTED IN MEDICAL RECORD: ICD-10-PCS | Mod: CPTII,,, | Performed by: OBSTETRICS & GYNECOLOGY

## 2022-06-20 PROCEDURE — 1159F MED LIST DOCD IN RCRD: CPT | Mod: CPTII,,, | Performed by: OBSTETRICS & GYNECOLOGY

## 2022-06-20 PROCEDURE — 3075F PR MOST RECENT SYSTOLIC BLOOD PRESS GE 130-139MM HG: ICD-10-PCS | Mod: CPTII,,, | Performed by: OBSTETRICS & GYNECOLOGY

## 2022-06-20 PROCEDURE — 3080F DIAST BP >= 90 MM HG: CPT | Mod: CPTII,,, | Performed by: OBSTETRICS & GYNECOLOGY

## 2022-06-20 PROCEDURE — 99396 PR PREVENTIVE VISIT,EST,40-64: ICD-10-PCS | Mod: S$PBB,,, | Performed by: OBSTETRICS & GYNECOLOGY

## 2022-06-20 PROCEDURE — 99999 PR PBB SHADOW E&M-EST. PATIENT-LVL IV: CPT | Mod: PBBFAC,,, | Performed by: OBSTETRICS & GYNECOLOGY

## 2022-06-20 PROCEDURE — 99214 OFFICE O/P EST MOD 30 MIN: CPT | Mod: PBBFAC | Performed by: OBSTETRICS & GYNECOLOGY

## 2022-06-20 PROCEDURE — 3008F PR BODY MASS INDEX (BMI) DOCUMENTED: ICD-10-PCS | Mod: CPTII,,, | Performed by: OBSTETRICS & GYNECOLOGY

## 2022-06-20 RX ORDER — FLUCONAZOLE 150 MG/1
150 TABLET ORAL
Qty: 6 TABLET | Refills: 0 | Status: SHIPPED | OUTPATIENT
Start: 2022-06-20 | End: 2022-08-04 | Stop reason: SDUPTHER

## 2022-06-20 RX ORDER — NYSTATIN AND TRIAMCINOLONE ACETONIDE 100000; 1 [USP'U]/G; MG/G
CREAM TOPICAL
Qty: 30 G | Refills: 1 | Status: SHIPPED | OUTPATIENT
Start: 2022-06-20 | End: 2023-06-20

## 2022-06-20 NOTE — PROGRESS NOTES
"[facility]  Ambulatory Clinic  Obstetrics & Gynecology    Visit Date:  2022    Chief Complaint:  Annual GYN exam, vaginal yeast infection    History of Present Illness:      Haydee Arreola is a 51 y.o.  here for a gynecologic exam with c/o vaginal yeast infection.  Pt described discharge as itchy, white, non bloody, without pelvic pain or abnormal vaginal bleeding for past few days.  Pt has h/o supracervical hysterectomy and bilateral salpingo-oophorectomy for AUB by Dr. Delgado.  Pt reports an uneventful transition into menopause and is not on hormone replacement therapy.    Last pap ~2021 benign.  Last mammo ~ benign.  Pt performs monthly self breast examination, former smoker, uses seat belts, and denies abuse.   Pt denies vaginal bleeding, dyspareunia, pelvic pain, bloating, early satiety, unintentional weight loss, breast mass/skin changes, incontinence, GI or urinary complaints.    Otherwise, the pt is in her usual state of health.    Past History:  Gynecologic history as noted above.    Review of Systems:      GENERAL:  No fever, fatigue, excessive weight gain or loss  HEENT:  No headaches, hearing changes, visual disturbance  RESPIRATORY:  H/o COPD.  CARDIOVASCULAR:  No chest pain, heart palpitations, leg swelling  BREAST:  No lump, pain, nipple discharge, skin changes  GASTROINTESTINAL:  No nausea, vomiting, constipation, diarrhea, abd pain, rectal bleeding   GENITOURINARY:  See HPI  ENDOCRINE:  No heat or cold intolerance  HEMATOLOGIC:  No easy bruisability or bleeding   LYMPHATICS:  No enlarged nodes  MUSCULOSKELETAL:  No acute joint pain or swelling  SKIN:  No rash, lesions, jaundice  NEUROLOGIC:  No dizziness, weakness, syncope  PSYCHIATRIC:  No significant mood changes, homicidal/suicidal ideations    Physical Exam:     BP (!) 130/90   Ht 5' 2" (1.575 m)   Wt 125.8 kg (277 lb 7.2 oz)   LMP 10/23/2013   BMI 50.75 kg/m²   Pulse 60's, Resp rate 18     GENERAL:  " No acute distress, well-nourished  HEENT:  Atraumatic, anicteric, moist mucus membranes. Neck supple w/o masses.  BREAST:  Symmetric, nontender, no obvious masses, adenopathy, skin changes or nipple discharge.  LUNGS:  Normal respiratory effort  HEART:  Regular rate and rhythm  ABDOMEN:  Soft, non-tender, non-distended, normoactive bowel sounds, no obvious organomegaly  EXT:  Symmetric w/o cramping, claudication, or edema. +2 distal pulses.  SKIN:  No rashes or bruising  PSYCH:  Mood and affect appropriate  NEURO:  Grossly intact bilaterally    GENITOURINARY:    VULVAR:  Female external genitalia w/o any obvious lesions. Normal urethral meatus. No gross lymphadenopathy.   VAGINA:  Normal vaginal mucosa.  Adequate support. No significant cystocele or rectocele. No obvious lesion. Mild white discharge, +yeast.  CERVIX:   Present, no cervical lesion, no CMT     UTERUS:  Surgically absent.   ADNEXA:  Surgically absent.    RECTAL:  Deferred. No obvious external lesions    Chaperone present for exam.    Assessment:     51 y.o.  with h/o supracervical hysterectomy and bilateral salpingo-oophorectomy:    1. Well woman gynecologic exam  2. Vaginal yeast infection    Plan:    A gynecologic health assessment was performed with age appropriate counseling.  Cervical cancer screening - pap up to date.  Screening mammogram ordered, pt advised to call to schedule.  Diflucan for yeast infection.  Mycolog 2 for vulva irritation.  Hygiene advice.  Encourage healthy lifestyle modifications, monthly self breast exams, Ca/Vit D, postmenopausal bleeding precautions, yearly mammogram, and colonoscopy.  Encourage tobacco cessation.  F/u with PCP for health maintenance.  Return 1 year for gynecologic exam or sooner as needed.    All questions answered, pt voiced understanding.        Hakan Duong MD

## 2022-06-22 ENCOUNTER — OFFICE VISIT (OUTPATIENT)
Dept: HEPATOLOGY | Facility: CLINIC | Age: 52
End: 2022-06-22
Payer: MEDICAID

## 2022-06-22 ENCOUNTER — LAB VISIT (OUTPATIENT)
Dept: LAB | Facility: HOSPITAL | Age: 52
End: 2022-06-22
Attending: INTERNAL MEDICINE
Payer: MEDICAID

## 2022-06-22 VITALS
WEIGHT: 276.56 LBS | HEIGHT: 62 IN | BODY MASS INDEX: 50.89 KG/M2 | HEART RATE: 70 BPM | TEMPERATURE: 98 F | OXYGEN SATURATION: 97 % | RESPIRATION RATE: 18 BRPM | SYSTOLIC BLOOD PRESSURE: 128 MMHG | DIASTOLIC BLOOD PRESSURE: 60 MMHG

## 2022-06-22 DIAGNOSIS — K76.0 FATTY LIVER: Primary | ICD-10-CM

## 2022-06-22 DIAGNOSIS — R60.9 EDEMA, UNSPECIFIED TYPE: ICD-10-CM

## 2022-06-22 DIAGNOSIS — R10.9 ABDOMINAL PAIN, UNSPECIFIED ABDOMINAL LOCATION: ICD-10-CM

## 2022-06-22 DIAGNOSIS — K76.89 HEPATIC CYST: ICD-10-CM

## 2022-06-22 LAB
AMORPH CRY UR QL COMP ASSIST: NORMAL
BILIRUB UR QL STRIP: NEGATIVE
CLARITY UR REFRACT.AUTO: ABNORMAL
COLOR UR AUTO: YELLOW
GLUCOSE UR QL STRIP: NEGATIVE
HGB UR QL STRIP: NEGATIVE
KETONES UR QL STRIP: NEGATIVE
LEUKOCYTE ESTERASE UR QL STRIP: ABNORMAL
MICROSCOPIC COMMENT: NORMAL
NITRITE UR QL STRIP: NEGATIVE
PH UR STRIP: 5 [PH] (ref 5–8)
PROT UR QL STRIP: NEGATIVE
SP GR UR STRIP: 1.02 (ref 1–1.03)
URN SPEC COLLECT METH UR: ABNORMAL
WBC #/AREA URNS AUTO: 0 /HPF (ref 0–5)

## 2022-06-22 PROCEDURE — 81001 URINALYSIS AUTO W/SCOPE: CPT | Performed by: INTERNAL MEDICINE

## 2022-06-22 PROCEDURE — 1160F RVW MEDS BY RX/DR IN RCRD: CPT | Mod: CPTII,,, | Performed by: INTERNAL MEDICINE

## 2022-06-22 PROCEDURE — 3008F BODY MASS INDEX DOCD: CPT | Mod: CPTII,,, | Performed by: INTERNAL MEDICINE

## 2022-06-22 PROCEDURE — 3078F PR MOST RECENT DIASTOLIC BLOOD PRESSURE < 80 MM HG: ICD-10-PCS | Mod: CPTII,,, | Performed by: INTERNAL MEDICINE

## 2022-06-22 PROCEDURE — 99214 OFFICE O/P EST MOD 30 MIN: CPT | Mod: S$PBB,,, | Performed by: INTERNAL MEDICINE

## 2022-06-22 PROCEDURE — 99215 OFFICE O/P EST HI 40 MIN: CPT | Mod: PBBFAC,PN | Performed by: INTERNAL MEDICINE

## 2022-06-22 PROCEDURE — 1160F PR REVIEW ALL MEDS BY PRESCRIBER/CLIN PHARMACIST DOCUMENTED: ICD-10-PCS | Mod: CPTII,,, | Performed by: INTERNAL MEDICINE

## 2022-06-22 PROCEDURE — 99214 PR OFFICE/OUTPT VISIT, EST, LEVL IV, 30-39 MIN: ICD-10-PCS | Mod: S$PBB,,, | Performed by: INTERNAL MEDICINE

## 2022-06-22 PROCEDURE — 1159F PR MEDICATION LIST DOCUMENTED IN MEDICAL RECORD: ICD-10-PCS | Mod: CPTII,,, | Performed by: INTERNAL MEDICINE

## 2022-06-22 PROCEDURE — 1159F MED LIST DOCD IN RCRD: CPT | Mod: CPTII,,, | Performed by: INTERNAL MEDICINE

## 2022-06-22 PROCEDURE — 99999 PR PBB SHADOW E&M-EST. PATIENT-LVL V: CPT | Mod: PBBFAC,,, | Performed by: INTERNAL MEDICINE

## 2022-06-22 PROCEDURE — 3074F PR MOST RECENT SYSTOLIC BLOOD PRESSURE < 130 MM HG: ICD-10-PCS | Mod: CPTII,,, | Performed by: INTERNAL MEDICINE

## 2022-06-22 PROCEDURE — 3008F PR BODY MASS INDEX (BMI) DOCUMENTED: ICD-10-PCS | Mod: CPTII,,, | Performed by: INTERNAL MEDICINE

## 2022-06-22 PROCEDURE — 99999 PR PBB SHADOW E&M-EST. PATIENT-LVL V: ICD-10-PCS | Mod: PBBFAC,,, | Performed by: INTERNAL MEDICINE

## 2022-06-22 PROCEDURE — 3078F DIAST BP <80 MM HG: CPT | Mod: CPTII,,, | Performed by: INTERNAL MEDICINE

## 2022-06-22 PROCEDURE — 3074F SYST BP LT 130 MM HG: CPT | Mod: CPTII,,, | Performed by: INTERNAL MEDICINE

## 2022-06-22 NOTE — PROGRESS NOTES
HEPATOLOGY FOLLOW UP    Referring Physician: Aguila Judd MD  Current Corresponding Physician: Aguila Judd MD    Haydee Arreola is here for follow up of fatty lvier and abnormal abdominal imaging.    HPI  Haydee Arreola is a 51 y.o. female who recently was evaluated in the ER when she developed rectal bleeding. As part of her evaluation because she had abdominal pain, she had a ct scan of the abdomen/pelvis 4/18/21. This revealed: There are multiple small and too small characterize low attenuation lesions seen left lobe of the liver and caudate.  Some are too small to characterize others have imaging characteristics that of simple cysts. She has normal liver enzymes and thus, no obvious chronic liver disease. The ct scan also showed infection/inflammation of unclear etiology in the RLQ. Appendix was not visualized. She was diagosed with enteritis and given antibiotics.     Her abdominal pain and other GI symptoms have resolved.     Labs 2010 and 2013: normal liver enzymes (ALT, aST <25)     Mammogram: last one in [facility] 2018  Pap test: last one in [facility] 2018  Colonoscopy: none on file at [facility]    Interval History  Since Haydee Arreola's last 2 visits:    Her  passed away 3 months ago. They were  for 31 years.    Labs 6/2/22: ALT 23, AST 29, ALKP 79, Tbil 0.3  BMI: 50    MRI Abdomen 5/14/21: A few scattered simple appearing hepatic cysts.  No suspicious hepatic lesions. Findings consistent with hepatic steatosis.  AFP 3.2, CEA 6.5,  14, CA 19-9 <2.0    Colonoscopy 6/9/21: hemorrhoids  Mammogram 6/21: normal  Pap 5/18/21: negative     The patient denies cognitive problems that would suggest hepatic encephalopathy, or GI bleeding from varices. However, she does have edema and bloating. She is also c/o generalized abdominal discomfort.     Outpatient Encounter Medications as of 6/22/2022   Medication Sig Dispense Refill    albuterol (ACCUNEB) 0.63 mg/3 mL Nebu Take 0.63  mg by nebulization every 6 (six) hours as needed.      albuterol-ipratropium (DUO-NEB) 2.5 mg-0.5 mg/3 mL nebulizer solution Inhale 3 mLs into the lungs.      albuterol-ipratropium (DUO-NEB) 2.5 mg-0.5 mg/3 mL nebulizer solution SMARTSIG:3 Milliliter(s) Via Nebulizer Every 6 Hours      ALPRAZolam (XANAX) 1 MG tablet Take 1 mg by mouth 2 (two) times daily.      amLODIPine (NORVASC) 10 MG tablet Take 10 mg by mouth once daily.  2    benzonatate (TESSALON) 200 MG capsule TAKE 1 CAPSULE BY MOUTH THREE TIMES A DAY AS NEEDED FOR COUGH  0    fluconazole (DIFLUCAN) 150 MG Tab Take 1 tablet (150 mg total) by mouth as needed (vaginal yeast infection). Take one pill weekly as needed for yeast infection. 6 tablet 0    HYDROcodone-acetaminophen (NORCO)  mg per tablet Take 1 tablet by mouth.      loratadine 10 mg Cap loratadine Take No date recorded No form recorded No frequency recorded No route recorded No set duration recorded No set duration amount recorded active No dosage strength recorded No dosage strength units of measure recorded      meloxicam (MOBIC) 15 MG tablet Take 15 mg by mouth daily as needed.      metoprolol succinate (TOPROL-XL) 25 MG 24 hr tablet Take 25 mg by mouth once daily.      montelukast (SINGULAIR) 10 mg tablet montelukast Take No date recorded No form recorded No frequency recorded No route recorded No set duration recorded No set duration amount recorded active No dosage strength recorded No dosage strength units of measure recorded      omeprazole (PRILOSEC) 40 MG capsule Take 40 mg by mouth once daily.  5    rosuvastatin (CRESTOR) 10 MG tablet Take 10 mg by mouth once daily.      sertraline (ZOLOFT) 100 MG tablet Take 100 mg by mouth once daily.      spironolactone (ALDACTONE) 25 MG tablet spironolactone Take No date recorded No form recorded No frequency recorded No route recorded No set duration recorded No set duration amount recorded active No dosage strength recorded No  dosage strength units of measure recorded      terconazole (TERAZOL 3) 0.8 % vaginal cream INSERT ONE APPLICATORFUL VAGINALLY AT BEDTIME 20 g 1    tiotropium (SPIRIVA) 18 mcg inhalation capsule Inhale 18 mcg into the lungs once daily. Controller      VENTOLIN HFA 90 mcg/actuation inhaler 2 puffs every 4 to 6 hours as needed.  4    ADVAIR DISKUS 250-50 mcg/dose diskus inhaler Inhale 1 puff into the lungs 2 (two) times daily.      alprazolam (XANAX) 2 MG Tab Take 2 mg by mouth 2 (two) times daily.  0    clotrimazole-betamethasone 1-0.05% (LOTRISONE) cream APPLY TO AFFECTED AREA TWICE DAILY (Patient not taking: Reported on 6/22/2022) 15 g 2    cyclobenzaprine (FLEXERIL) 10 MG tablet       docusate sodium (COLACE) 100 MG capsule Take 1 capsule (100 mg total) by mouth 2 (two) times daily as needed for Constipation. (Patient not taking: Reported on 6/22/2022) 30 capsule 0    hydroCHLOROthiazide (HYDRODIURIL) 12.5 MG Tab Take 12.5 mg by mouth once daily.      hydroCHLOROthiazide (HYDRODIURIL) 25 MG tablet Take 25 mg by mouth.      hydroCHLOROthiazide (HYDRODIURIL) 25 MG tablet Take 25 mg by mouth once daily.      neomycin-polymyxin-hydrocortisone (CORTISPORIN) otic solution INT 3 GTS IN AU QID FOR 10 DAYS      nitroGLYCERIN (NITROSTAT) 0.4 MG SL tablet DISSOLVE 1 TABLET UNDER THE TONGUE EVERY 5 MINS AS NEEDED FOR CHEST PAIN FOR UP TO 3 DOSES. CALL 911 IF NO RELIEF      nystatin (MYCOSTATIN) 100,000 unit/mL suspension nystatin Take No date recorded No form recorded No frequency recorded No route recorded No set duration recorded No set duration amount recorded active No dosage strength recorded No dosage strength units of measure recorded      nystatin-triamcinolone (MYCOLOG II) cream Apply to affected area 2 times daily (Patient not taking: Reported on 6/22/2022) 30 g 1    ondansetron (ZOFRAN-ODT) 8 MG TbDL Take 1 tablet (8 mg total) by mouth every 8 (eight) hours as needed (nausea). (Patient not taking:  Reported on 6/22/2022) 30 tablet 0    potassium chloride (KLOR-CON) 10 MEQ TbSR Take 10 mEq by mouth once daily.      potassium chloride (MICRO-K) 10 MEQ CpSR potassium chloride Take No date recorded No form recorded No frequency recorded No route recorded No set duration recorded No set duration amount recorded active No dosage strength recorded No dosage strength units of measure recorded      potassium chloride (MICRO-K) 10 MEQ CpSR Take 10 mEq by mouth.      sertraline (ZOLOFT) 25 MG tablet Take 100 mg by mouth once daily.        Facility-Administered Encounter Medications as of 6/22/2022   Medication Dose Route Frequency Provider Last Rate Last Admin    estradiol valerate injection 20 mg  20 mg Intramuscular Q30 Days Yuri Walden MD   20 mg at 09/16/21 1414     Review of patient's allergies indicates:   Allergen Reactions    Tramadol Swelling and Anaphylaxis     Throat swelling  Throat swelling    Codeine Other (See Comments)     Unknown reaction  Unknown reaction    Ketorolac Nausea Only     Past Medical History:   Diagnosis Date    Anemia     Anesthesia     PT HAS BULGING DISC/ HERNIATED  DISC C2-C7    Anxiety     Arthritis     RA    Asthma     Back problem     Blood transfusion     Cervical pain     COPD (chronic obstructive pulmonary disease)     CTS (carpal tunnel syndrome)     Fatty liver 5/31/2021    GERD (gastroesophageal reflux disease)     Joint pain     shoulder    Joint pain     knee    Liver lesion 5/2/2021    Lumbar pain     Migraine     Neck pain     Panic attacks     Rotator cuff tear     RIGHT    Sciatica     Thyroid disease        Review of Systems   Constitutional: Negative.    HENT: Negative.    Eyes: Negative.    Respiratory: Negative.    Cardiovascular: Negative.    Gastrointestinal: Negative.    Genitourinary: Negative.    Musculoskeletal: Negative.    Skin: Negative.    Neurological: Negative.    Psychiatric/Behavioral: Negative.      Vitals:    06/22/22  1049   BP: 128/60   Pulse: 70   Resp: 18   Temp: 98.1 °F (36.7 °C)       Physical Exam  Vitals reviewed.   Constitutional:       Appearance: She is well-developed.   HENT:      Head: Normocephalic and atraumatic.   Eyes:      General: No scleral icterus.     Conjunctiva/sclera: Conjunctivae normal.      Pupils: Pupils are equal, round, and reactive to light.   Neck:      Thyroid: No thyromegaly.   Cardiovascular:      Rate and Rhythm: Normal rate and regular rhythm.      Heart sounds: Normal heart sounds.   Pulmonary:      Effort: Pulmonary effort is normal.      Breath sounds: Normal breath sounds. No rales.   Abdominal:      General: Bowel sounds are normal. There is no distension.      Palpations: Abdomen is soft. There is no mass.      Tenderness: There is no abdominal tenderness.   Musculoskeletal:         General: Normal range of motion.      Cervical back: Normal range of motion and neck supple.   Skin:     General: Skin is warm and dry.      Findings: No rash.   Neurological:      Mental Status: She is alert and oriented to person, place, and time.         MELD-Na score: 6 at 6/2/2022  9:45 AM  MELD score: 6 at 6/2/2022  9:45 AM  Calculated from:  Serum Creatinine: 0.73 mg/dL (Using min of 1 mg/dL) at 6/2/2022  9:45 AM  Serum Sodium: 143 mmol/L (Using max of 137 mmol/L) at 6/2/2022  9:45 AM  Total Bilirubin: 0.3 mg/dL (Using min of 1 mg/dL) at 6/2/2022  9:45 AM  INR(ratio): 0.9 (Using min of 1) at 6/2/2022  9:45 AM  Age: 51 years    Lab Results   Component Value Date    GLU 84 06/02/2022    BUN 14 06/02/2022    CREATININE 0.73 06/02/2022    CALCIUM 9.4 06/02/2022     06/02/2022    K 4.2 06/02/2022     06/02/2022    PROT 6.4 06/02/2022    CO2 32 06/02/2022    ANIONGAP 8 05/03/2021    WBC 4.9 06/02/2022    RBC 4.54 06/02/2022    HGB 13.8 06/02/2022    HCT 43.1 06/02/2022    MCV 94.9 06/02/2022    MCH 30.4 06/02/2022    MCHC 32.0 06/02/2022     Lab Results   Component Value Date    RDW 14.3  06/02/2022     06/02/2022    MPV 10.9 06/02/2022    GRAN 5.0 05/03/2021    GRAN 70.9 05/03/2021    LYMPH 882 06/02/2022    LYMPH 18.0 06/02/2022    MONO 441 06/02/2022    MONO 9.0 06/02/2022    EOSINOPHIL 1.4 06/02/2022    BASOPHIL 1.0 06/02/2022    EOS 69 06/02/2022    BASO 49 06/02/2022    APTT 26.4 04/12/2010    GROUPTRH AB POS 11/16/2013    GROUPTRH AB POS 04/12/2010    ALBUMIN 4.2 06/02/2022    AST 29 06/02/2022    ALT 23 06/02/2022    ALKPHOS 118 05/03/2021    LABPROT 9.7 06/02/2022    INR 0.9 06/02/2022       Assessment and Plan:  Haydee Arreola is a 51 y.o. female with a fatty liver. CT suggestive of cysts. MRI shows no obvious cirrhosis (normal liver enzymes continue) and no solid liver lesions, only benign hepatic cysts. AFP, CA 19-9 and  normal. CEA elevated but colonoscopy showed no colon cancer.    The edema and bloating I doubt is from the liver but will proceed with MRE to ensure she does not have cirrhosis despite normal liver tests. I am also recommending an echo and urinalysis as part of the w/u for this. For the abdominal discomfort I am recommending and MRI abod/pelvis.    Return 8 weeks

## 2022-07-05 ENCOUNTER — HOSPITAL ENCOUNTER (OUTPATIENT)
Dept: CARDIOLOGY | Facility: HOSPITAL | Age: 52
Discharge: HOME | End: 2022-07-05
Attending: INTERNAL MEDICINE
Payer: MEDICAID

## 2022-07-05 DIAGNOSIS — R60.9 EDEMA, UNSPECIFIED TYPE: ICD-10-CM

## 2022-07-05 LAB
AORTIC ROOT ANNULUS: 2.69 CM
AORTIC VALVE CUSP SEPERATION: 1.95 CM
ASCENDING AORTA: 2.89 CM
AV INDEX (PROSTH): 0.64
AV MEAN GRADIENT: 5 MMHG
AV PEAK GRADIENT: 11 MMHG
AV VALVE AREA: 1.94 CM2
AV VELOCITY RATIO: 0.64
CV ECHO LV RWT: 0.45 CM
DOP CALC AO PEAK VEL: 1.69 M/S
DOP CALC AO VTI: 39.21 CM
DOP CALC LVOT AREA: 3 CM2
DOP CALC LVOT DIAMETER: 1.96 CM
DOP CALC LVOT PEAK VEL: 1.08 M/S
DOP CALC LVOT STROKE VOLUME: 76.15 CM3
DOP CALCLVOT PEAK VEL VTI: 25.25 CM
E WAVE DECELERATION TIME: 209.84 MSEC
E/A RATIO: 1.11
E/E' RATIO: 7.15 M/S
ECHO LV POSTERIOR WALL: 1.29 CM (ref 0.6–1.1)
EJECTION FRACTION: 55 %
FRACTIONAL SHORTENING: 38 % (ref 28–44)
INTERVENTRICULAR SEPTUM: 1.13 CM (ref 0.6–1.1)
LA MAJOR: 4.8 CM
LA MINOR: 5.68 CM
LA WIDTH: 4.11 CM
LEFT ATRIUM SIZE: 4.37 CM
LEFT ATRIUM VOLUME: 79.43 CM3
LEFT INTERNAL DIMENSION IN SYSTOLE: 3.56 CM (ref 2.1–4)
LEFT VENTRICLE DIASTOLIC VOLUME: 161.81 ML
LEFT VENTRICLE SYSTOLIC VOLUME: 53.05 ML
LEFT VENTRICULAR INTERNAL DIMENSION IN DIASTOLE: 5.73 CM (ref 3.5–6)
LEFT VENTRICULAR MASS: 294.47 G
LV LATERAL E/E' RATIO: 7.15 M/S
LV SEPTAL E/E' RATIO: 7.15 M/S
MV PEAK A VEL: 0.84 M/S
MV PEAK E VEL: 0.93 M/S
MV STENOSIS PRESSURE HALF TIME: 67.79 MS
MV VALVE AREA P 1/2 METHOD: 3.25 CM2
PISA MRMAX VEL: 0.04 M/S
PISA TR MAX VEL: 2.77 M/S
PV PEAK VELOCITY: 0.95 CM/S
RA MAJOR: 4.85 CM
RA PRESSURE: 3 MMHG
RA WIDTH: 3.57 CM
SINUS: 2.72 CM
STJ: 2.41 CM
TDI LATERAL: 0.13 M/S
TDI SEPTAL: 0.13 M/S
TDI: 0.13 M/S
TR MAX PG: 31 MMHG
TV REST PULMONARY ARTERY PRESSURE: 34 MMHG

## 2022-07-05 PROCEDURE — 93306 ECHO (CUPID ONLY): ICD-10-PCS | Mod: 26,,, | Performed by: INTERNAL MEDICINE

## 2022-07-05 PROCEDURE — 93306 TTE W/DOPPLER COMPLETE: CPT | Mod: 26,,, | Performed by: INTERNAL MEDICINE

## 2022-07-05 PROCEDURE — 93306 TTE W/DOPPLER COMPLETE: CPT

## 2022-07-08 ENCOUNTER — TELEPHONE (OUTPATIENT)
Dept: HEPATOLOGY | Facility: CLINIC | Age: 52
End: 2022-07-08
Payer: MEDICAID

## 2022-07-15 ENCOUNTER — HOSPITAL ENCOUNTER (OUTPATIENT)
Dept: RADIOLOGY | Facility: HOSPITAL | Age: 52
Discharge: HOME | End: 2022-07-15
Attending: INTERNAL MEDICINE
Payer: MEDICAID

## 2022-07-15 DIAGNOSIS — K76.0 FATTY LIVER: ICD-10-CM

## 2022-07-15 PROCEDURE — 72197 MRI ABDOMEN-PELVIS W W/O CONTRAST (XPD): ICD-10-PCS | Mod: 26,,, | Performed by: STUDENT IN AN ORGANIZED HEALTH CARE EDUCATION/TRAINING PROGRAM

## 2022-07-15 PROCEDURE — A9585 GADOBUTROL INJECTION: HCPCS | Performed by: INTERNAL MEDICINE

## 2022-07-15 PROCEDURE — 74183 MRI ABDOMEN-PELVIS W W/O CONTRAST (XPD): ICD-10-PCS | Mod: 26,,, | Performed by: STUDENT IN AN ORGANIZED HEALTH CARE EDUCATION/TRAINING PROGRAM

## 2022-07-15 PROCEDURE — 76391 MR ELASTOGRAPHY: CPT | Mod: 26,,, | Performed by: STUDENT IN AN ORGANIZED HEALTH CARE EDUCATION/TRAINING PROGRAM

## 2022-07-15 PROCEDURE — 74183 MRI ABD W/O CNTR FLWD CNTR: CPT | Mod: 26,,, | Performed by: STUDENT IN AN ORGANIZED HEALTH CARE EDUCATION/TRAINING PROGRAM

## 2022-07-15 PROCEDURE — 76391 MR ELASTOGRAPHY: CPT | Mod: TC

## 2022-07-15 PROCEDURE — 76391 MR ELASTOGRAPHY: ICD-10-PCS | Mod: 26,,, | Performed by: STUDENT IN AN ORGANIZED HEALTH CARE EDUCATION/TRAINING PROGRAM

## 2022-07-15 PROCEDURE — 72197 MRI PELVIS W/O & W/DYE: CPT | Mod: TC

## 2022-07-15 PROCEDURE — 25500020 PHARM REV CODE 255: Performed by: INTERNAL MEDICINE

## 2022-07-15 PROCEDURE — 72197 MRI PELVIS W/O & W/DYE: CPT | Mod: 26,,, | Performed by: STUDENT IN AN ORGANIZED HEALTH CARE EDUCATION/TRAINING PROGRAM

## 2022-07-15 RX ORDER — GADOBUTROL 604.72 MG/ML
10 INJECTION INTRAVENOUS
Status: COMPLETED | OUTPATIENT
Start: 2022-07-15 | End: 2022-07-15

## 2022-07-15 RX ADMIN — GADOBUTROL 10 ML: 604.72 INJECTION INTRAVENOUS at 11:07

## 2022-07-18 ENCOUNTER — TELEPHONE (OUTPATIENT)
Dept: HEPATOLOGY | Facility: CLINIC | Age: 52
End: 2022-07-18
Payer: MEDICAID

## 2022-07-18 NOTE — TELEPHONE ENCOUNTER
No answer, left a message that informed her the results of her tests are coming to her in the mail

## 2022-08-04 ENCOUNTER — TELEPHONE (OUTPATIENT)
Dept: OBSTETRICS AND GYNECOLOGY | Facility: HOSPITAL | Age: 52
End: 2022-08-04

## 2022-08-04 DIAGNOSIS — B37.31 VAGINAL YEAST INFECTION: ICD-10-CM

## 2022-08-04 DIAGNOSIS — N76.1 CHRONIC VAGINITIS: ICD-10-CM

## 2022-08-04 RX ORDER — TERCONAZOLE 8 MG/G
CREAM VAGINAL
Qty: 20 G | Refills: 1 | Status: SHIPPED | OUTPATIENT
Start: 2022-08-04 | End: 2023-05-30

## 2022-08-04 RX ORDER — FLUCONAZOLE 150 MG/1
150 TABLET ORAL
Qty: 6 TABLET | Refills: 0 | Status: SHIPPED | OUTPATIENT
Start: 2022-08-04

## 2022-08-04 NOTE — TELEPHONE ENCOUNTER
----- Message from Samantha Joseph MA sent at 8/4/2022 11:59 AM CDT -----  Pt asking for Diflucan and Terazol cream. Please assist.   ----- Message -----  From: Verenice Fuentes  Sent: 8/4/2022  10:29 AM CDT  To: Rhoda PRITCHARD Staff    Type: RX Refill Request    Who Called: self    Have you contacted your pharmacy:call    Refill or New Rx:refill    RX Name and Strength:fluconazole (DIFLUCAN) 150 MG Tab and terconazole (TERAZOL 3) 0.8 % vaginal cream      Preferred Pharmacy with phone number:[facility] #[id number] [place] [address] AT [facility]   Phone: [phone]  Fax:  [phone]      Local or Mail Order:local    Would the patient rather a call back or a response via [facility]? call    Best Call Back Number:[phone] (home)

## 2022-08-13 ENCOUNTER — HOSPITAL ENCOUNTER (EMERGENCY)
Facility: HOSPITAL | Age: 52
Discharge: HOME | End: 2022-08-13
Attending: EMERGENCY MEDICINE
Payer: MEDICAID

## 2022-08-13 VITALS
BODY MASS INDEX: 49.13 KG/M2 | OXYGEN SATURATION: 97 % | SYSTOLIC BLOOD PRESSURE: 144 MMHG | TEMPERATURE: 98 F | DIASTOLIC BLOOD PRESSURE: 89 MMHG | HEART RATE: 58 BPM | WEIGHT: 267 LBS | HEIGHT: 62 IN | RESPIRATION RATE: 16 BRPM

## 2022-08-13 DIAGNOSIS — M25.561 ACUTE PAIN OF RIGHT KNEE: Primary | ICD-10-CM

## 2022-08-13 DIAGNOSIS — M25.571 ACUTE RIGHT ANKLE PAIN: ICD-10-CM

## 2022-08-13 DIAGNOSIS — M79.675 PAIN OF TOE OF LEFT FOOT: ICD-10-CM

## 2022-08-13 DIAGNOSIS — R52 PAIN: ICD-10-CM

## 2022-08-13 PROCEDURE — 99283 EMERGENCY DEPT VISIT LOW MDM: CPT | Mod: ER

## 2022-08-13 RX ORDER — IBUPROFEN 600 MG/1
600 TABLET ORAL EVERY 6 HOURS
Qty: 20 TABLET | Refills: 0 | Status: SHIPPED | OUTPATIENT
Start: 2022-08-13

## 2022-08-13 NOTE — ED PROVIDER NOTES
Encounter Date: 8/13/2022    SCRIBE #1 NOTE: I, Ana Guerra, am scribing for, and in the presence of,  Ligia Preciado MD. I have scribed the following portions of the note - Other sections scribed: HPI, ROS, and PEx.       History     Chief Complaint   Patient presents with    Knee Pain     Right knee pain    Toe Pain     Left great toe pain    Ankle Pain     Right ankle pain.  Onset 10 days ago after bringing groceries upstairs, not getting better     Haydee Arreola is a 51 y.o. female patient with a PMHx of joint pain and HTN who presents to the Emergency Department for evaluation of right knee pain which began 10 days ago. Patient was carrying bags upstairs before her knee began hurting.  Pain has been constant since onset.  She also reports pain in the right ankle and left great toe. Patient occasionally takes 600 mg of Advil with no relief. She has been applying heat as well.  Patient states that her right knee hurts when she bends it. Symptoms are constant and moderate in severity. Patient denies any fever, chills, N/V, HA, SOB, and all other sxs at this time. Patient is a frequent smoker. No further complaints or concerns at this time. She does report old injury of knee seen on previous MRI.    The history is provided by the patient. No  was used.     Review of patient's allergies indicates:   Allergen Reactions    Tramadol Swelling and Anaphylaxis     Throat swelling  Throat swelling    Codeine Other (See Comments)     Unknown reaction  Unknown reaction    Ketorolac Nausea Only     Past Medical History:   Diagnosis Date    Abdominal pain 6/22/2022    Anemia     Anesthesia     PT HAS BULGING DISC/ HERNIATED  DISC C2-C7    Anxiety     Arthritis     RA    Asthma     Back problem     Blood transfusion     Cervical pain     COPD (chronic obstructive pulmonary disease)     CTS (carpal tunnel syndrome)     Edema 6/22/2022    Fatty liver 5/31/2021    GERD (gastroesophageal  reflux disease)     Joint pain     shoulder    Joint pain     knee    Liver lesion 2021    Lumbar pain     Migraine     Neck pain     Panic attacks     Rotator cuff tear     RIGHT    Sciatica     Thyroid disease      Past Surgical History:   Procedure Laterality Date    APPENDECTOMY       SECTION, LOW TRANSVERSE      x 2    COLONOSCOPY Left 2021    Procedure: COLONOSCOPY;  Surgeon: Akila Li MD;  Location: [facility];  Service: Endoscopy;  Laterality: Left;  BMI 51.67  covid test [place]   pt requested [facility]  cardiology clearance scanned into media tab dated -MS    HYSTERECTOMY      left ankle surg      plates and screws    OOPHORECTOMY      TONSILLECTOMY       Family History   Problem Relation Age of Onset    Diabetes Sister     Breast cancer Maternal Aunt     Breast cancer Paternal Grandmother      Social History     Tobacco Use    Smoking status: Former Smoker     Packs/day: 1.00     Years: 8.00     Pack years: 8.00    Smokeless tobacco: Former User   Substance Use Topics    Alcohol use: No    Drug use: No     Review of Systems   Constitutional: Negative for activity change, appetite change, chills and fever.   HENT: Negative for congestion, rhinorrhea, sneezing and sore throat.    Respiratory: Negative for cough, choking, shortness of breath and wheezing.    Cardiovascular: Negative for chest pain and palpitations.   Gastrointestinal: Negative for abdominal pain, diarrhea, nausea and vomiting.   Musculoskeletal: Positive for arthralgias (Right knee pain, right ankle pain, left big toe pain), gait problem and joint swelling. Negative for back pain.   Skin: Negative for color change, pallor, rash and wound.   Neurological: Negative for dizziness, syncope, light-headedness and headaches.   All other systems reviewed and are negative.      Physical Exam     Initial Vitals [22 1542]   BP Pulse Resp Temp SpO2   (!) 142/78 70 19 98.4 °F (36.9 °C) 97 %       MAP       --         Physical Exam    Nursing note and vitals reviewed.  Constitutional: She appears well-developed and well-nourished. No distress.   HENT:   Head: Normocephalic and atraumatic.   Eyes: Conjunctivae are normal.   Neck:   Normal range of motion.  Cardiovascular: Normal rate, regular rhythm and normal heart sounds.   No murmur heard.  Pulmonary/Chest: Breath sounds normal. No respiratory distress.   Abdominal: Abdomen is soft. Bowel sounds are normal. She exhibits no distension. There is no abdominal tenderness.   Musculoskeletal:         General: No tenderness or edema. Normal range of motion.      Cervical back: Normal range of motion.      Right knee: Swelling present.        Legs:      Neurological: She is alert and oriented to person, place, and time. GCS score is 15. GCS eye subscore is 4. GCS verbal subscore is 5. GCS motor subscore is 6.   Skin: Skin is warm and dry.   Psychiatric: She has a normal mood and affect. Her behavior is normal.         ED Course   Procedures  Labs Reviewed - No data to display       Imaging Results          X-Ray Knee 3 View Right (Final result)  Result time 08/13/22 16:40:27    Final result by Lennox Best MD (08/13/22 16:40:27)                 Impression:      Mild degenerative changes with no acute radiographic abnormality.      Electronically signed by: Lennox Best  Date:    08/13/2022  Time:    16:40             Narrative:    EXAMINATION:  XR KNEE 3 VIEW RIGHT    CLINICAL HISTORY:  Pain, unspecified    TECHNIQUE:  AP, lateral, and Merchant views of the right knee were performed.    COMPARISON:  None    FINDINGS:  Alignment is satisfactory.  No acute fracture, subluxation or dislocation.  Mild spurring of the patellofemoral joint.  Minimal joint space narrowing the medial compartment and patellofemoral joint.  No significant joint effusion.  Large body habitus.  No osseous destruction.                               X-Ray Ankle Complete Right (Final  result)  Result time 08/13/22 16:39:45    Final result by Lennox eBst MD (08/13/22 16:39:45)                 Impression:      No acute radiographic abnormality.  See above comments.  Recommend clinical correlation.      Electronically signed by: Lennox Best  Date:    08/13/2022  Time:    16:39             Narrative:    EXAMINATION:  XR ANKLE COMPLETE 3 VIEW RIGHT    CLINICAL HISTORY:  Pain, unspecified    TECHNIQUE:  AP, lateral, and oblique images of the right ankle were performed.    COMPARISON:  None    FINDINGS:  Alignment is satisfactory.  No acute fracture, subluxation or dislocation.  No mass or foreign body.  Mild osteophytic spurring of the medial and lateral malleolus.    Soft tissues appear within normal limits.  The ankle mortise is intact.    Calcaneal spurring inferiorly and posteriorly.  Prominent calcification of the distal Achilles tendon may be associated with prior injury.    Slight bony prominence of the posterosuperior aspect of the calcaneum consistent with Chas deformity.  This can be associated with retrocalcaneal bursitis.  Recommend clinical correlation.                               X-Ray Foot Complete Left (Final result)  Result time 08/13/22 16:30:56    Final result by Lennox Best MD (08/13/22 16:30:56)                 Impression:      Mild degenerative changes with no acute radiographic abnormality.      Electronically signed by: Lennox Best  Date:    08/13/2022  Time:    16:30             Narrative:    EXAMINATION:  XR FOOT COMPLETE 3 VIEW LEFT    CLINICAL HISTORY:  .  Pain, unspecified    TECHNIQUE:  AP, lateral and oblique views of the left foot were performed.    COMPARISON:  None    FINDINGS:  Alignment is satisfactory.  No acute fracture, subluxation or dislocation.  No mass or foreign body.  Surgical plates associated with the distal fibula.  Minimal hallux valgus deformity.  Mild joint space narrowing of the 1st metatarsophalangeal joint.  No osseous  destruction.  Mild degenerative changes of the interphalangeal joints.    Mild calcaneal spurring inferiorly.                                 Medications - No data to display  Medical Decision Making:   History:   Old Medical Records: I decided to obtain old medical records.  Clinical Tests:   Radiological Study: Ordered and Reviewed  ED Management:  R knee pain - no signs of infection and no acute bony abnormality on xray.  Exam does not suggest gout.  Will treat with NSAIDs and ice.  R ankle and L great toe - no acute abnormalities on xray.    Follow up with Ortho.          Scribe Attestation:   Scribe #1: I performed the above scribed service and the documentation accurately describes the services I performed. I attest to the accuracy of the note.               I, Dr. Ligia Preciado, personally performed the services described in this documentation.   All medical record entries made by the scribe were at my direction and in my presence.   I have reviewed the chart and agree that the record is accurate and complete.   Ligia Preciado MD.  6:24 PM 08/13/2022   Clinical Impression:   Final diagnoses:  [R52] Pain  [M25.561] Acute pain of right knee (Primary)  [M25.571] Acute right ankle pain  [M79.675] Pain of toe of left foot          ED Disposition Condition    Discharge Stable        ED Prescriptions     Medication Sig Dispense Start Date End Date Auth. Provider    ibuprofen (ADVIL,MOTRIN) 600 MG tablet Take 1 tablet (600 mg total) by mouth every 6 (six) hours. 20 tablet 8/13/2022  Ligia Preciado MD        Follow-up Information     Follow up With Specialties Details Why Contact Info Additional Information    [facility] Orthopedics Schedule an appointment as soon as possible for a visit   [address] [facility], [address] [id number] [phone] Please park in surface lot and use [facility] entrance. Check in at main registration.            Ligia Preciado MD  08/13/22 6011

## 2022-08-13 NOTE — FIRST PROVIDER EVALUATION
"Medical screening exam completed.  I have conducted a focused provider triage encounter, findings are as follows:    Brief history of present illness:  Right knee and ankle pain; left great toe pain for 10 days    Vitals:    08/13/22 1542   BP: (!) 142/78   Pulse: 70   Resp: 19   Temp: 98.4 °F (36.9 °C)   TempSrc: Oral   Weight: 121.1 kg (267 lb)   Height: 5' 2" (1.575 m)       Pertinent physical exam:  In a WC, NAD noted    Brief workup plan:  Xrays    Preliminary workup initiated; this workup will be continued and followed by the physician or advanced practice provider that is assigned to the patient when roomed.  "

## 2022-08-13 NOTE — DISCHARGE INSTRUCTIONS
Apply ice to knee for 20 minutes 3 times a day.  Take motrin every 6 hours.  Wear ACE wrap for support.  Follow up with Orthopedics for further evaluation of your knee pain.

## 2022-08-17 ENCOUNTER — OFFICE VISIT (OUTPATIENT)
Dept: HEPATOLOGY | Facility: CLINIC | Age: 52
End: 2022-08-17
Payer: MEDICAID

## 2022-08-17 VITALS
BODY MASS INDEX: 49.99 KG/M2 | DIASTOLIC BLOOD PRESSURE: 80 MMHG | HEIGHT: 62 IN | TEMPERATURE: 99 F | SYSTOLIC BLOOD PRESSURE: 140 MMHG | OXYGEN SATURATION: 96 % | HEART RATE: 64 BPM | RESPIRATION RATE: 19 BRPM | WEIGHT: 271.63 LBS

## 2022-08-17 DIAGNOSIS — K76.89 HEPATIC CYST: ICD-10-CM

## 2022-08-17 DIAGNOSIS — R10.13 EPIGASTRIC PAIN: ICD-10-CM

## 2022-08-17 DIAGNOSIS — K76.9 LIVER LESION: ICD-10-CM

## 2022-08-17 DIAGNOSIS — K76.0 FATTY LIVER: Primary | ICD-10-CM

## 2022-08-17 PROCEDURE — 99999 PR PBB SHADOW E&M-EST. PATIENT-LVL V: ICD-10-PCS | Mod: PBBFAC,,, | Performed by: INTERNAL MEDICINE

## 2022-08-17 PROCEDURE — 3077F SYST BP >= 140 MM HG: CPT | Mod: CPTII,,, | Performed by: INTERNAL MEDICINE

## 2022-08-17 PROCEDURE — 3008F BODY MASS INDEX DOCD: CPT | Mod: CPTII,,, | Performed by: INTERNAL MEDICINE

## 2022-08-17 PROCEDURE — 3079F DIAST BP 80-89 MM HG: CPT | Mod: CPTII,,, | Performed by: INTERNAL MEDICINE

## 2022-08-17 PROCEDURE — 99215 OFFICE O/P EST HI 40 MIN: CPT | Mod: PBBFAC,PN | Performed by: INTERNAL MEDICINE

## 2022-08-17 PROCEDURE — 99214 OFFICE O/P EST MOD 30 MIN: CPT | Mod: S$PBB,,, | Performed by: INTERNAL MEDICINE

## 2022-08-17 PROCEDURE — 1160F RVW MEDS BY RX/DR IN RCRD: CPT | Mod: CPTII,,, | Performed by: INTERNAL MEDICINE

## 2022-08-17 PROCEDURE — 99999 PR PBB SHADOW E&M-EST. PATIENT-LVL V: CPT | Mod: PBBFAC,,, | Performed by: INTERNAL MEDICINE

## 2022-08-17 PROCEDURE — 1159F PR MEDICATION LIST DOCUMENTED IN MEDICAL RECORD: ICD-10-PCS | Mod: CPTII,,, | Performed by: INTERNAL MEDICINE

## 2022-08-17 PROCEDURE — 1160F PR REVIEW ALL MEDS BY PRESCRIBER/CLIN PHARMACIST DOCUMENTED: ICD-10-PCS | Mod: CPTII,,, | Performed by: INTERNAL MEDICINE

## 2022-08-17 PROCEDURE — 1159F MED LIST DOCD IN RCRD: CPT | Mod: CPTII,,, | Performed by: INTERNAL MEDICINE

## 2022-08-17 PROCEDURE — 3077F PR MOST RECENT SYSTOLIC BLOOD PRESSURE >= 140 MM HG: ICD-10-PCS | Mod: CPTII,,, | Performed by: INTERNAL MEDICINE

## 2022-08-17 PROCEDURE — 3079F PR MOST RECENT DIASTOLIC BLOOD PRESSURE 80-89 MM HG: ICD-10-PCS | Mod: CPTII,,, | Performed by: INTERNAL MEDICINE

## 2022-08-17 PROCEDURE — 99214 PR OFFICE/OUTPT VISIT, EST, LEVL IV, 30-39 MIN: ICD-10-PCS | Mod: S$PBB,,, | Performed by: INTERNAL MEDICINE

## 2022-08-17 PROCEDURE — 3008F PR BODY MASS INDEX (BMI) DOCUMENTED: ICD-10-PCS | Mod: CPTII,,, | Performed by: INTERNAL MEDICINE

## 2022-08-17 NOTE — PROGRESS NOTES
HEPATOLOGY FOLLOW UP    Referring Physician: Jaxson Oh MD  Current Corresponding Physician: Jaxson Oh MD    Haydee Arreola is here for follow up of fatty lvier and abnormal abdominal imaging.    HPI  Haydee Arreola is a 51 y.o. female who was evaluated in the ER 4/8/21 when she developed rectal bleeding. As part of her evaluation because she had abdominal pain, she had a ct scan of the abdomen/pelvis 4/18/21. This revealed: There are multiple small and too small characterize low attenuation lesions seen left lobe of the liver and caudate.  Some are too small to characterize others have imaging characteristics that of simple cysts. She has normal liver enzymes and thus, no obvious chronic liver disease. The ct scan also showed infection/inflammation of unclear etiology in the RLQ. Appendix was not visualized. She was diagosed with enteritis and given antibiotics.     Labs 2010 and 2013: normal liver enzymes (ALT, AST <25)     Mammogram: last one in [facility] 2018  Pap test: last one in [facility] 2018  Colonoscopy: none on file at [facility]    Interval History  Since Haydee Arreola's last few visits:    Her  passed away  Early 2022. They were  for 31 years.    Labs 6/2/22: ALT 23, AST 29, ALKP 79, Tbil 0.3  BMI: 50    At her last visit 06/22 she had edema. To evaluate I ordered an MRE and 2D echo. For continued pain, I ordered and MRI:    MRI Abdomen pelvis w wo contrast 7/15/22: Liver: Enlarged measuring 19.5 cm.  Diffuse loss of parenchymal signal on out of phase imaging consistent with steatosis.  Few stable scattered simple and minimally complex hepatic cysts containing thin septation.  No suspicious enhancing lesion.  No contrast washout or pseudo capsule.  Hepatic and portal veins are patent.AFP 3.2, CEA 6.5,  14, CA 19-9 <2.0  MRE 7/15/22: mild steatosis, F0 fibrosis  2D echo 7/5/22:.  · The estimated ejection fraction is 55%; The left ventricle is mildly enlarged with  concentric hypertrophy and normal systolic function  · Normal left ventricular diastolic function.  · Normal right ventricular size with normal right ventricular systolic function.  · Mild left atrial enlargement.  · Mild mitral regurgitation.  · Normal central venous pressure (3 mmHg).  · The estimated PA systolic pressure is 34 mmHg.    Colonoscopy 6/9/21: hemorrhoids  Mammogram 6/21: normal  Pap 5/18/21: negative     The patient denies cognitive problems that would suggest hepatic encephalopathy, or GI bleeding from varices. Edema and abdominal pain have improved.    Outpatient Encounter Medications as of 8/17/2022   Medication Sig Dispense Refill    albuterol (ACCUNEB) 0.63 mg/3 mL Nebu Take 0.63 mg by nebulization every 6 (six) hours as needed.      ALPRAZolam (XANAX) 1 MG tablet Take 1 mg by mouth 2 (two) times daily.      amLODIPine (NORVASC) 10 MG tablet Take 10 mg by mouth once daily.  2    benzonatate (TESSALON) 200 MG capsule TAKE 1 CAPSULE BY MOUTH THREE TIMES A DAY AS NEEDED FOR COUGH  0    clotrimazole-betamethasone 1-0.05% (LOTRISONE) cream APPLY TO AFFECTED AREA TWICE DAILY 15 g 2    hydroCHLOROthiazide (HYDRODIURIL) 12.5 MG Tab Take 12.5 mg by mouth once daily.      HYDROcodone-acetaminophen (NORCO)  mg per tablet Take 1 tablet by mouth.      ibuprofen (ADVIL,MOTRIN) 600 MG tablet Take 1 tablet (600 mg total) by mouth every 6 (six) hours. 20 tablet 0    loratadine 10 mg Cap loratadine Take No date recorded No form recorded No frequency recorded No route recorded No set duration recorded No set duration amount recorded active No dosage strength recorded No dosage strength units of measure recorded      meloxicam (MOBIC) 15 MG tablet Take 15 mg by mouth daily as needed.      metoprolol succinate (TOPROL-XL) 25 MG 24 hr tablet Take 25 mg by mouth once daily.      montelukast (SINGULAIR) 10 mg tablet montelukast Take No date recorded No form recorded No frequency recorded No route  recorded No set duration recorded No set duration amount recorded active No dosage strength recorded No dosage strength units of measure recorded      omeprazole (PRILOSEC) 40 MG capsule Take 40 mg by mouth once daily.  5    potassium chloride (KLOR-CON) 10 MEQ TbSR Take 10 mEq by mouth once daily.      rosuvastatin (CRESTOR) 10 MG tablet Take 10 mg by mouth once daily.      sertraline (ZOLOFT) 100 MG tablet Take 100 mg by mouth once daily.      spironolactone (ALDACTONE) 25 MG tablet spironolactone Take No date recorded No form recorded No frequency recorded No route recorded No set duration recorded No set duration amount recorded active No dosage strength recorded No dosage strength units of measure recorded      terconazole (TERAZOL 3) 0.8 % vaginal cream INSERT ONE APPLICATORFUL VAGINALLY AT BEDTIME 20 g 1    tiotropium (SPIRIVA) 18 mcg inhalation capsule Inhale 18 mcg into the lungs once daily. Controller      VENTOLIN HFA 90 mcg/actuation inhaler 2 puffs every 4 to 6 hours as needed.  4    ADVAIR DISKUS 250-50 mcg/dose diskus inhaler Inhale 1 puff into the lungs 2 (two) times daily.      albuterol-ipratropium (DUO-NEB) 2.5 mg-0.5 mg/3 mL nebulizer solution Inhale 3 mLs into the lungs.      albuterol-ipratropium (DUO-NEB) 2.5 mg-0.5 mg/3 mL nebulizer solution SMARTSIG:3 Milliliter(s) Via Nebulizer Every 6 Hours      alprazolam (XANAX) 2 MG Tab Take 2 mg by mouth 2 (two) times daily.  0    cyclobenzaprine (FLEXERIL) 10 MG tablet       docusate sodium (COLACE) 100 MG capsule Take 1 capsule (100 mg total) by mouth 2 (two) times daily as needed for Constipation. (Patient not taking: No sig reported) 30 capsule 0    fluconazole (DIFLUCAN) 150 MG Tab Take 1 tablet (150 mg total) by mouth as needed (vaginal yeast infection). Take one pill weekly as needed for yeast infection. (Patient not taking: Reported on 8/17/2022) 6 tablet 0    hydroCHLOROthiazide (HYDRODIURIL) 25 MG tablet Take 25 mg by mouth.       hydroCHLOROthiazide (HYDRODIURIL) 25 MG tablet Take 25 mg by mouth once daily.      neomycin-polymyxin-hydrocortisone (CORTISPORIN) otic solution INT 3 GTS IN AU QID FOR 10 DAYS      nitroGLYCERIN (NITROSTAT) 0.4 MG SL tablet DISSOLVE 1 TABLET UNDER THE TONGUE EVERY 5 MINS AS NEEDED FOR CHEST PAIN FOR UP TO 3 DOSES. CALL 911 IF NO RELIEF      nystatin (MYCOSTATIN) 100,000 unit/mL suspension nystatin Take No date recorded No form recorded No frequency recorded No route recorded No set duration recorded No set duration amount recorded active No dosage strength recorded No dosage strength units of measure recorded      nystatin-triamcinolone (MYCOLOG II) cream Apply to affected area 2 times daily (Patient not taking: No sig reported) 30 g 1    ondansetron (ZOFRAN-ODT) 8 MG TbDL Take 1 tablet (8 mg total) by mouth every 8 (eight) hours as needed (nausea). (Patient not taking: No sig reported) 30 tablet 0    potassium chloride (MICRO-K) 10 MEQ CpSR potassium chloride Take No date recorded No form recorded No frequency recorded No route recorded No set duration recorded No set duration amount recorded active No dosage strength recorded No dosage strength units of measure recorded      potassium chloride (MICRO-K) 10 MEQ CpSR Take 10 mEq by mouth.      sertraline (ZOLOFT) 25 MG tablet Take 100 mg by mouth once daily.        Facility-Administered Encounter Medications as of 8/17/2022   Medication Dose Route Frequency Provider Last Rate Last Admin    estradiol valerate injection 20 mg  20 mg Intramuscular Q30 Days Yuri Walden MD   20 mg at 09/16/21 1414     Review of patient's allergies indicates:   Allergen Reactions    Tramadol Swelling and Anaphylaxis     Throat swelling  Throat swelling    Codeine Other (See Comments)     Unknown reaction  Unknown reaction    Ketorolac Nausea Only     Past Medical History:   Diagnosis Date    Abdominal pain 6/22/2022    Anemia     Anesthesia     PT HAS BULGING  DISC/ HERNIATED  DISC C2-C7    Anxiety     Arthritis     RA    Asthma     Back problem     Blood transfusion     Cervical pain     COPD (chronic obstructive pulmonary disease)     CTS (carpal tunnel syndrome)     Edema 6/22/2022    Fatty liver 5/31/2021    GERD (gastroesophageal reflux disease)     Joint pain     shoulder    Joint pain     knee    Liver lesion 5/2/2021    Lumbar pain     Migraine     Neck pain     Panic attacks     Rotator cuff tear     RIGHT    Sciatica     Thyroid disease        Review of Systems   Constitutional: Negative.    HENT: Negative.    Eyes: Negative.    Respiratory: Negative.    Cardiovascular: Negative.    Gastrointestinal: Negative.    Genitourinary: Negative.    Musculoskeletal: Negative.    Skin: Negative.    Neurological: Negative.    Psychiatric/Behavioral: Negative.      Vitals:    08/17/22 1033   BP: (!) 140/80   Pulse: 64   Resp: 19   Temp: 98.6 °F (37 °C)       Physical Exam  Vitals reviewed.   Constitutional:       Appearance: She is well-developed.   HENT:      Head: Normocephalic and atraumatic.   Eyes:      General: No scleral icterus.     Conjunctiva/sclera: Conjunctivae normal.      Pupils: Pupils are equal, round, and reactive to light.   Neck:      Thyroid: No thyromegaly.   Cardiovascular:      Rate and Rhythm: Normal rate and regular rhythm.      Heart sounds: Normal heart sounds.   Pulmonary:      Effort: Pulmonary effort is normal.      Breath sounds: Normal breath sounds. No rales.   Abdominal:      General: Bowel sounds are normal. There is no distension.      Palpations: Abdomen is soft. There is no mass.      Tenderness: There is no abdominal tenderness.   Musculoskeletal:         General: Normal range of motion.      Cervical back: Normal range of motion and neck supple.   Skin:     General: Skin is warm and dry.      Findings: No rash.   Neurological:      Mental Status: She is alert and oriented to person, place, and time.          MELD-Na score: 6 at 6/2/2022  9:45 AM  MELD score: 6 at 6/2/2022  9:45 AM  Calculated from:  Serum Creatinine: 0.73 mg/dL (Using min of 1 mg/dL) at 6/2/2022  9:45 AM  Serum Sodium: 143 mmol/L (Using max of 137 mmol/L) at 6/2/2022  9:45 AM  Total Bilirubin: 0.3 mg/dL (Using min of 1 mg/dL) at 6/2/2022  9:45 AM  INR(ratio): 0.9 (Using min of 1) at 6/2/2022  9:45 AM  Age: 51 years    Lab Results   Component Value Date    GLU 84 06/02/2022    BUN 14 06/02/2022    CREATININE 0.73 06/02/2022    CALCIUM 9.4 06/02/2022     06/02/2022    K 4.2 06/02/2022     06/02/2022    PROT 6.4 06/02/2022    CO2 32 06/02/2022    ANIONGAP 8 05/03/2021    WBC 4.9 06/02/2022    RBC 4.54 06/02/2022    HGB 13.8 06/02/2022    HCT 43.1 06/02/2022    MCV 94.9 06/02/2022    MCH 30.4 06/02/2022    MCHC 32.0 06/02/2022     Lab Results   Component Value Date    RDW 14.3 06/02/2022     06/02/2022    MPV 10.9 06/02/2022    GRAN 5.0 05/03/2021    GRAN 70.9 05/03/2021    LYMPH 882 06/02/2022    LYMPH 18.0 06/02/2022    MONO 441 06/02/2022    MONO 9.0 06/02/2022    EOSINOPHIL 1.4 06/02/2022    BASOPHIL 1.0 06/02/2022    EOS 69 06/02/2022    BASO 49 06/02/2022    APTT 26.4 04/12/2010    GROUPTRH AB POS 11/16/2013    GROUPTRH AB POS 04/12/2010    ALBUMIN 4.2 06/02/2022    AST 29 06/02/2022    ALT 23 06/02/2022    ALKPHOS 118 05/03/2021    LABPROT 9.7 06/02/2022    INR 0.9 06/02/2022       Assessment and Plan:  Haydee Arreola is a 51 y.o. female with a fatty liver. CT suggestive of cysts. MRI shows no obvious cirrhosis (normal liver enzymes continue) and no solid liver lesions, only benign hepatic cysts, which are stable on repeat imaging. MRE does not show any significant fibrosis. 2D echo is satisfactory. Thus, no obvious etiology from liver or heart to account for edema. Source of abdoiminal pain is also of unclear etiology. Both have improved.    I am recommending a return visit in one year with labs and an abdo  US

## 2023-01-01 NOTE — PROGRESS NOTES
hormonal replacement therapy (delestrogen & depotestosterone) , information given to pt about menopause & hrt medications,injection given via l gluteal    Without incident.    [Normal Development] : Normal Development [None] : none [Turns to voice] : turns to voice [Vocalizes with extending cooing] : vocalizes with extending cooing [Rolls over prone to supine] : rolls over prone to supine [Supports on elbows & wrists in prone] : supports on elbows and wrists in prone [Plays with fingers in midline] : plays with fingers in midline [Keeps hands unfisted] : keeps hands unfisted [Grasps objects] : grasps objects [Laughs aloud] : does not laugh aloud [FreeTextEntry1] : head lag to follow

## 2023-04-28 ENCOUNTER — HOSPITAL ENCOUNTER (EMERGENCY)
Facility: HOSPITAL | Age: 53
Discharge: HOME | End: 2023-04-28
Attending: EMERGENCY MEDICINE
Payer: MEDICAID

## 2023-04-28 VITALS
HEART RATE: 80 BPM | DIASTOLIC BLOOD PRESSURE: 86 MMHG | TEMPERATURE: 97 F | OXYGEN SATURATION: 99 % | BODY MASS INDEX: 48.83 KG/M2 | RESPIRATION RATE: 20 BRPM | WEIGHT: 267 LBS | SYSTOLIC BLOOD PRESSURE: 160 MMHG

## 2023-04-28 DIAGNOSIS — R10.9 ABDOMINAL PAIN: ICD-10-CM

## 2023-04-28 DIAGNOSIS — G89.29 CHRONIC ABDOMINAL PAIN: Primary | ICD-10-CM

## 2023-04-28 DIAGNOSIS — R10.9 CHRONIC ABDOMINAL PAIN: Primary | ICD-10-CM

## 2023-04-28 DIAGNOSIS — R63.5 WEIGHT GAIN: ICD-10-CM

## 2023-04-28 PROBLEM — J45.909 ASTHMA: Status: ACTIVE | Noted: 2023-04-28

## 2023-04-28 PROBLEM — F41.9 ANXIETY: Status: ACTIVE | Noted: 2020-05-20

## 2023-04-28 PROBLEM — K21.9 GERD WITHOUT ESOPHAGITIS: Status: ACTIVE | Noted: 2018-11-20

## 2023-04-28 PROBLEM — J44.9 CHRONIC OBSTRUCTIVE PULMONARY DISEASE: Status: ACTIVE | Noted: 2023-04-28

## 2023-04-28 LAB
ALBUMIN SERPL-MCNC: 4 G/DL (ref 3.3–5.5)
ALBUMIN SERPL-MCNC: 4.1 G/DL (ref 3.3–5.5)
ALP SERPL-CCNC: 102 U/L (ref 42–141)
ALP SERPL-CCNC: 105 U/L (ref 42–141)
BILIRUB SERPL-MCNC: 0.6 MG/DL (ref 0.2–1.6)
BILIRUB SERPL-MCNC: 0.7 MG/DL (ref 0.2–1.6)
BILIRUBIN, POC UA: NEGATIVE
BLOOD, POC UA: NEGATIVE
BUN SERPL-MCNC: 17 MG/DL (ref 7–22)
CALCIUM SERPL-MCNC: 10.1 MG/DL (ref 8–10.3)
CHLORIDE SERPL-SCNC: 108 MMOL/L (ref 98–108)
CLARITY, POC UA: CLEAR
COLOR, POC UA: YELLOW
CREAT SERPL-MCNC: 0.9 MG/DL (ref 0.6–1.2)
GLUCOSE SERPL-MCNC: 106 MG/DL (ref 73–118)
GLUCOSE, POC UA: NEGATIVE
KETONES, POC UA: NEGATIVE
LEUKOCYTE EST, POC UA: NEGATIVE
NITRITE, POC UA: NEGATIVE
PH UR STRIP: 6 [PH]
POC ALT (SGPT): 24 U/L (ref 10–47)
POC ALT (SGPT): 26 U/L (ref 10–47)
POC AMYLASE: 24 U/L (ref 14–97)
POC AST (SGOT): 41 U/L (ref 11–38)
POC AST (SGOT): 41 U/L (ref 11–38)
POC B-TYPE NATRIURETIC PEPTIDE: 92.7 PG/ML (ref 0–100)
POC CARDIAC TROPONIN I: 0.01 NG/ML (ref 0–0.08)
POC GGT: 34 U/L (ref 5–65)
POC PTINR: 1.1 (ref 0.9–1.2)
POC PTWBT: 13.1 SEC (ref 9.7–14.3)
POC TCO2: 28 MMOL/L (ref 18–33)
POTASSIUM BLD-SCNC: 4.2 MMOL/L (ref 3.6–5.1)
PROTEIN, POC UA: ABNORMAL
PROTEIN, POC: 7.2 G/DL (ref 6.4–8.1)
PROTEIN, POC: 7.5 G/DL (ref 6.4–8.1)
SAMPLE: NORMAL
SAMPLE: NORMAL
SODIUM BLD-SCNC: 142 MMOL/L (ref 128–145)
SPECIFIC GRAVITY, POC UA: 1.02
UROBILINOGEN, POC UA: 0.2 E.U./DL

## 2023-04-28 PROCEDURE — 96374 THER/PROPH/DIAG INJ IV PUSH: CPT | Mod: ER

## 2023-04-28 PROCEDURE — 93010 ELECTROCARDIOGRAM REPORT: CPT | Mod: ,,, | Performed by: INTERNAL MEDICINE

## 2023-04-28 PROCEDURE — 25500020 PHARM REV CODE 255: Mod: ER | Performed by: EMERGENCY MEDICINE

## 2023-04-28 PROCEDURE — 63600175 PHARM REV CODE 636 W HCPCS: Mod: ER | Performed by: EMERGENCY MEDICINE

## 2023-04-28 PROCEDURE — 93010 EKG 12-LEAD: ICD-10-PCS | Mod: ,,, | Performed by: INTERNAL MEDICINE

## 2023-04-28 PROCEDURE — 80053 COMPREHEN METABOLIC PANEL: CPT | Mod: ER

## 2023-04-28 PROCEDURE — 85025 COMPLETE CBC W/AUTO DIFF WBC: CPT | Mod: ER

## 2023-04-28 PROCEDURE — 82150 ASSAY OF AMYLASE: CPT | Mod: ER

## 2023-04-28 PROCEDURE — 99285 EMERGENCY DEPT VISIT HI MDM: CPT | Mod: 25,ER

## 2023-04-28 PROCEDURE — 83880 ASSAY OF NATRIURETIC PEPTIDE: CPT | Mod: ER

## 2023-04-28 PROCEDURE — 96375 TX/PRO/DX INJ NEW DRUG ADDON: CPT | Mod: ER

## 2023-04-28 PROCEDURE — 81003 URINALYSIS AUTO W/O SCOPE: CPT | Mod: ER

## 2023-04-28 PROCEDURE — 84484 ASSAY OF TROPONIN QUANT: CPT | Mod: ER

## 2023-04-28 PROCEDURE — 25000003 PHARM REV CODE 250: Mod: ER | Performed by: EMERGENCY MEDICINE

## 2023-04-28 PROCEDURE — 96361 HYDRATE IV INFUSION ADD-ON: CPT | Mod: ER

## 2023-04-28 PROCEDURE — 93005 ELECTROCARDIOGRAM TRACING: CPT | Mod: ER

## 2023-04-28 RX ORDER — ACETAMINOPHEN 500 MG
1000 TABLET ORAL EVERY 6 HOURS PRN
Qty: 30 TABLET | Refills: 0 | Status: SHIPPED | OUTPATIENT
Start: 2023-04-28

## 2023-04-28 RX ORDER — PROMETHAZINE HYDROCHLORIDE 25 MG/1
25 SUPPOSITORY RECTAL EVERY 6 HOURS PRN
Qty: 10 SUPPOSITORY | Refills: 0 | Status: SHIPPED | OUTPATIENT
Start: 2023-04-28

## 2023-04-28 RX ORDER — ONDANSETRON 2 MG/ML
8 INJECTION INTRAMUSCULAR; INTRAVENOUS
Status: COMPLETED | OUTPATIENT
Start: 2023-04-28 | End: 2023-04-28

## 2023-04-28 RX ORDER — FAMOTIDINE 10 MG/ML
20 INJECTION INTRAVENOUS
Status: COMPLETED | OUTPATIENT
Start: 2023-04-28 | End: 2023-04-28

## 2023-04-28 RX ORDER — POLYETHYLENE GLYCOL 3350 17 G/17G
17 POWDER, FOR SOLUTION ORAL 2 TIMES DAILY PRN
Qty: 1020 G | Refills: 0 | Status: SHIPPED | OUTPATIENT
Start: 2023-04-28 | End: 2023-05-28

## 2023-04-28 RX ORDER — ONDANSETRON 8 MG/1
8 TABLET, ORALLY DISINTEGRATING ORAL EVERY 6 HOURS PRN
Qty: 20 TABLET | Refills: 0 | Status: SHIPPED | OUTPATIENT
Start: 2023-04-28 | End: 2023-04-30

## 2023-04-28 RX ORDER — FAMOTIDINE 20 MG/1
20 TABLET, FILM COATED ORAL 2 TIMES DAILY
Qty: 20 TABLET | Refills: 0 | Status: SHIPPED | OUTPATIENT
Start: 2023-04-28 | End: 2024-04-27

## 2023-04-28 RX ADMIN — ONDANSETRON 8 MG: 2 INJECTION INTRAMUSCULAR; INTRAVENOUS at 11:04

## 2023-04-28 RX ADMIN — FAMOTIDINE 20 MG: 10 INJECTION INTRAVENOUS at 11:04

## 2023-04-28 RX ADMIN — IOHEXOL 100 ML: 350 INJECTION, SOLUTION INTRAVENOUS at 11:04

## 2023-04-28 RX ADMIN — SODIUM CHLORIDE 1000 ML: 9 INJECTION, SOLUTION INTRAVENOUS at 11:04

## 2023-04-28 NOTE — ED PROVIDER NOTES
"Encounter Date: 4/28/2023    SCRIBE #1 NOTE: I, Vanessa Hansen, am scribing for, and in the presence of,  Jen Nolan DO. I have scribed the following portions of the note - Other sections scribed: HPI; ROS; PE.     History     Chief Complaint   Patient presents with    Abdominal Pain     Haydee Arreola, a 52 y.o. female presents to the ED via PV with CC of generalized abdominal pain x2.5 weeks that is worsening. Pt also reports 1 episode of vomiting last night, states "I gained 20lbs in two weeks from abdominal swelling." Does have pmhx of diverticulitis and fatty liver. Also states she has pmhx of hemorrhoids and did notice blood in the stool is unsure if the blood is hemorrhoids or a GI bleed           Haydee Arreola is a 52 y.o. female with Hx of COPD and GERD who presents to the ED for chief complaint of acute on chronic abdominal pain and distension onset 2.5 weeks ago. Associated symptoms are vomiting and unexpected weight gain. Patient reports she has been dealing with these symptoms for a while but have recently worsened. She states she's had an MRI and colonoscopy in the past and was seen by her PCP 2 weeks ago. Patient notes she weighs herself weekly and normally weighs around 258-262 lbs but weighed 277 lbs last week. She endorses taking dulcolax and has a BM daily, but notes they are not normal. Patient states she had an episode of emesis after eating dinner last night. She denies associated nausea or diarrhea. No further complaints at this time. Patient is allergic to tramadol, ketorolac, and codeine.         The history is provided by the patient. No  was used.   Review of patient's allergies indicates:   Allergen Reactions    Tramadol Swelling and Anaphylaxis     Throat swelling  Throat swelling    Codeine Other (See Comments)     Unknown reaction  Unknown reaction    Ketorolac Nausea Only     Past Medical History:   Diagnosis Date    Abdominal pain 6/22/2022    Anemia  "    Anesthesia     PT HAS BULGING DISC/ HERNIATED  DISC C2-C7    Anxiety     Arthritis     RA    Asthma     Back problem     Blood transfusion     Cervical pain     COPD (chronic obstructive pulmonary disease)     CTS (carpal tunnel syndrome)     Edema 2022    Fatty liver 2021    GERD (gastroesophageal reflux disease)     Joint pain     shoulder    Joint pain     knee    Liver lesion 2021    Lumbar pain     Migraine     Neck pain     Panic attacks     Rotator cuff tear     RIGHT    Sciatica     Thyroid disease      Past Surgical History:   Procedure Laterality Date    APPENDECTOMY       SECTION, LOW TRANSVERSE      x 2    COLONOSCOPY Left 2021    Procedure: COLONOSCOPY;  Surgeon: Akila Li MD;  Location: [facility];  Service: Endoscopy;  Laterality: Left;  BMI 51.67  covid test [place]   pt requested [facility]  cardiology clearance scanned into media tab dated -MS    HYSTERECTOMY      left ankle surg      plates and screws    OOPHORECTOMY      TONSILLECTOMY       Family History   Problem Relation Age of Onset    Diabetes Sister     Breast cancer Maternal Aunt     Breast cancer Paternal Grandmother      Social History     Tobacco Use    Smoking status: Former     Packs/day: 1.00     Years: 8.00     Pack years: 8.00     Types: Cigarettes    Smokeless tobacco: Former   Substance Use Topics    Alcohol use: No    Drug use: No     Review of Systems   Constitutional:  Positive for unexpected weight change. Negative for fever.   HENT:  Negative for congestion, sore throat and trouble swallowing.    Respiratory:  Negative for cough and shortness of breath.    Cardiovascular:  Negative for chest pain.   Gastrointestinal:  Positive for abdominal distention, abdominal pain and vomiting. Negative for constipation, diarrhea and nausea.   Genitourinary:  Negative for dysuria, flank pain, frequency and urgency.   Musculoskeletal:  Negative for back pain.   Skin:  Negative for rash.    Neurological:  Negative for headaches.   All other systems reviewed and are negative.    Physical Exam     Initial Vitals [04/28/23 1047]   BP Pulse Resp Temp SpO2   (!) 160/86 74 (!) 22 97.6 °F (36.4 °C) 96 %      MAP       --         Physical Exam    Nursing note and vitals reviewed.  Constitutional: She appears well-developed and well-nourished. She is Obese . No distress.   HENT:   Head: Normocephalic and atraumatic.   Right Ear: External ear normal.   Left Ear: External ear normal.   Eyes: Conjunctivae and EOM are normal. Pupils are equal, round, and reactive to light. Right eye exhibits no discharge. Left eye exhibits no discharge.   Neck: Neck supple.   Normal range of motion.  Cardiovascular:  Normal rate, regular rhythm, normal heart sounds and intact distal pulses.     Exam reveals no gallop and no friction rub.       No murmur heard.  Pulmonary/Chest: Breath sounds normal. No respiratory distress. She has no wheezes. She has no rhonchi. She has no rales. She exhibits no tenderness.   Abdominal: Abdomen is soft. Bowel sounds are normal. She exhibits distension. She exhibits no mass. There is generalized abdominal tenderness.   No CVA tenderness. No rigidity. Bowel sounds normal x4. No point tenderness.  There is no rebound and no guarding.   Musculoskeletal:         General: No tenderness or edema. Normal range of motion.      Cervical back: Normal range of motion and neck supple.      Comments: Extremities normal.     Neurological: She is alert and oriented to person, place, and time.   Skin: Skin is warm and dry.   Psychiatric: She has a normal mood and affect.       ED Course   Procedures  Labs Reviewed   POCT URINALYSIS W/O SCOPE - Abnormal; Notable for the following components:       Result Value    Protein, UA Trace (*)     All other components within normal limits   POCT LIVER PANEL - Abnormal; Notable for the following components:    AST (SGOT), POC 41 (*)     All other components within normal  limits   POCT CMP - Abnormal; Notable for the following components:    AST (SGOT), POC 41 (*)     All other components within normal limits   TROPONIN ISTAT   POCT URINALYSIS W/O SCOPE   POCT CBC   POCT URINALYSIS W/O SCOPE   POCT CMP   POCT LIVER PANEL   POCT PROTIME-INR   POCT TROPONIN   POCT B-TYPE NATRIURETIC PEPTIDE (BNP)   ISTAT PROCEDURE   POCT B-TYPE NATRIURETIC PEPTIDE (BNP)     EKG Readings: (Independently Interpreted)   EKG interpreted by Dr. Nolan.  No STEMI.  Sinus bradycardia ventricular rate 58.  Normal axis.  Aside from rate normal EKG.   when compared to prior EKG done on 04/18/2021 rate has decreased by 12 beats per minute today.     Imaging Results              CT Abdomen Pelvis With Contrast (Final result)  Result time 04/28/23 12:22:26      Final result by Van Castillo MD (04/28/23 12:22:26)                   Impression:      1. Findings suggesting hepatic steatosis, correlation with LFTs recommended.  2. No findings to suggest obstructive uropathy.  3. Please see above for additional findings.      Electronically signed by: Van Castillo MD  Date:    04/28/2023  Time:    12:22               Narrative:    EXAMINATION:  CT ABDOMEN PELVIS WITH CONTRAST    CLINICAL HISTORY:  Abdominal abscess/infection suspected;    TECHNIQUE:  Low dose axial images, sagittal and coronal reformations were obtained from the lung bases to the pubic symphysis following the IV administration of 100 mL of Omnipaque 350 .  Oral contrast was not given.    COMPARISON:  MRI 07/15/2022, CT abdomen and pelvis 04/18/2022    FINDINGS:  Images of the lower thorax are remarkable for minimal dependent atelectasis.    The liver is hypoattenuating suggesting steatosis, correlation with LFTs recommended.  Several low attenuating lesions are noted throughout the hepatic parenchyma, largest within the medial aspect of the right hepatic lobe measures 1.9 cm with attenuation suggesting cyst, the others are too small for  characterization.  The spleen, pancreas, gallbladder and adrenal glands are grossly unremarkable.  The stomach is decompressed without wall thickening.  The portal vein, splenic vein, SMV, celiac axis and SMA all are patent.  No significant abdominal lymphadenopathy.    The kidneys enhance symmetrically without hydronephrosis or nephrolithiasis.  The bilateral ureters are unable to be followed in their entirety to the urinary bladder, no definite calculi seen or secondary findings to suggest obstructive uropathy.  The urinary bladder is decompressed without wall thickening.  The uterus is absent the adnexa is unremarkable.    The large bowel is unremarkable.  The terminal ileum is unremarkable.  The appendix is not confidently identified, no pericecal inflammation.  The small bowel is grossly unremarkable.  There are a few scattered shotty periaortic and paracaval lymph nodes.  No focal organized pelvic fluid collection.    There are degenerative changes of the spine.  No significant inguinal lymphadenopathy.                                       Medications   sodium chloride 0.9% bolus 1,000 mL 1,000 mL (0 mLs Intravenous Stopped 4/28/23 1218)   ondansetron injection 8 mg (8 mg Intravenous Given 4/28/23 1118)   famotidine (PF) injection 20 mg (20 mg Intravenous Given 4/28/23 1119)   iohexoL (OMNIPAQUE 350) injection 100 mL (100 mLs Intravenous Given 4/28/23 1159)     Medical Decision Making:   History:   Old Medical Records: I decided to obtain old medical records.  Independently Interpreted Test(s):   I have ordered and independently interpreted X-rays - see prior notes.  I have ordered and independently interpreted EKG Reading(s) - see prior notes  Clinical Tests:   Lab Tests: Reviewed and Ordered  Radiological Study: Ordered and Reviewed  Medical Tests: Ordered and Reviewed     This is an evaluation of a 52 y.o. female that presents to the Emergency Department for   Chief Complaint   Patient presents with     "Abdominal Pain     Haydee Arreola, a 52 y.o. female presents to the ED via PV with CC of generalized abdominal pain x2.5 weeks that is worsening. Pt also reports 1 episode of vomiting last night, states "I gained 20lbs in two weeks from abdominal swelling." Does have pmhx of diverticulitis and fatty liver. Also states she has pmhx of hemorrhoids and did notice blood in the stool is unsure if the blood is hemorrhoids or a GI bleed             The patient is a non-toxic and well appearing patient. On physical exam, patient appears well hydrated with moist mucus membranes. Breath sounds are clear and equal bilaterally with no adventitious breath sounds, tachypnea or respiratory distress. Regular rate and rhythm. No murmurs. Abdomen soft with generalized tenderness. Patient is tolerating PO without difficulty.  Vital Signs Are Reassuring.     Differential diagnosis: Abdominal Pain, Abdominal Mass, Abdominal infection, Chronic Pain, UTI, IBS, IBD    ED Course:Treatment in the ED included Physical Exam and medications given in ED  Medications   sodium chloride 0.9% bolus 1,000 mL 1,000 mL (0 mLs Intravenous Stopped 4/28/23 1218)   ondansetron injection 8 mg (8 mg Intravenous Given 4/28/23 1118)   famotidine (PF) injection 20 mg (20 mg Intravenous Given 4/28/23 1119)   iohexoL (OMNIPAQUE 350) injection 100 mL (100 mLs Intravenous Given 4/28/23 1159)   .   Patient reports feeling better after treatment in the ER.     Labs Reviewed    Insert CBC here       Admission on 04/28/2023, Discharged on 04/28/2023   Component Date Value Ref Range Status    Glucose, UA 04/28/2023 Negative   Final    Bilirubin, UA 04/28/2023 Negative   Final    Ketones, UA 04/28/2023 Negative   Final    Spec Grav UA 04/28/2023 1.020   Final    Blood, UA 04/28/2023 Negative   Final    PH, UA 04/28/2023 6.0   Final    Protein, UA 04/28/2023 Trace (A)   Final    Urobilinogen, UA 04/28/2023 0.2  E.U./dL Final    Nitrite, UA 04/28/2023 Negative   Final "    Leukocytes, UA 04/28/2023 Negative   Final    Color, UA 04/28/2023 Yellow   Final    Clarity, UA 04/28/2023 Clear   Final    POC PTWBT 04/28/2023 13.1  9.7 - 14.3 sec Final    POC PTINR 04/28/2023 1.1  0.9 - 1.2 Final    Sample 04/28/2023 unknown   Final    Albumin, POC 04/28/2023 4.1  3.3 - 5.5 g/dL Final    Alkaline Phosphatase, POC 04/28/2023 102  42 - 141 U/L Final    ALT (SGPT), POC 04/28/2023 26  10 - 47 U/L Final    Amylase, POC 04/28/2023 24  14 - 97 U/L Final    AST (SGOT), POC 04/28/2023 41 (H)  11 - 38 U/L Final    POC GGT 04/28/2023 34  5 - 65 U/L Final    Bilirubin, POC 04/28/2023 0.6  0.2 - 1.6 mg/dL Final    Protein, POC 04/28/2023 7.2  6.4 - 8.1 g/dL Final    Albumin, POC 04/28/2023 4.0  3.3 - 5.5 g/dL Final    Alkaline Phosphatase, POC 04/28/2023 105  42 - 141 U/L Final    ALT (SGPT), POC 04/28/2023 24  10 - 47 U/L Final    AST (SGOT), POC 04/28/2023 41 (H)  11 - 38 U/L Final    POC BUN 04/28/2023 17  7 - 22 mg/dL Final    Calcium, POC 04/28/2023 10.1  8.0 - 10.3 mg/dL Final    POC Chloride 04/28/2023 108  98 - 108 mmol/L Final    POC Creatinine 04/28/2023 0.9  0.6 - 1.2 mg/dL Final    POC Glucose 04/28/2023 106  73 - 118 mg/dL Final    POC Potassium 04/28/2023 4.2  3.6 - 5.1 mmol/L Final    POC Sodium 04/28/2023 142  128 - 145 mmol/L Final    Bilirubin, POC 04/28/2023 0.7  0.2 - 1.6 mg/dL Final    POC TCO2 04/28/2023 28  18 - 33 mmol/L Final    Protein, POC 04/28/2023 7.5  6.4 - 8.1 g/dL Final    POC Cardiac Troponin I 04/28/2023 0.01  0.00 - 0.08 ng/mL Final    Sample 04/28/2023 unknown   Final    Comment: A single negative troponin is insufficient to rule out myocardial infarction.  The use of a serial sampling protocol is recommended practice. Correlate results with reference intervals established for methodology used. Point of care and core laboratory   troponin results are not interchangeable.      POC B-Type Natriuretic Peptide 04/28/2023 92.7  0.0 - 100.0 pg/mL Final        Imaging  Reviewed    Imaging Results              CT Abdomen Pelvis With Contrast (Final result)  Result time 04/28/23 12:22:26      Final result by Van Castillo MD (04/28/23 12:22:26)                   Impression:      1. Findings suggesting hepatic steatosis, correlation with LFTs recommended.  2. No findings to suggest obstructive uropathy.  3. Please see above for additional findings.      Electronically signed by: Van Castillo MD  Date:    04/28/2023  Time:    12:22               Narrative:    EXAMINATION:  CT ABDOMEN PELVIS WITH CONTRAST    CLINICAL HISTORY:  Abdominal abscess/infection suspected;    TECHNIQUE:  Low dose axial images, sagittal and coronal reformations were obtained from the lung bases to the pubic symphysis following the IV administration of 100 mL of Omnipaque 350 .  Oral contrast was not given.    COMPARISON:  MRI 07/15/2022, CT abdomen and pelvis 04/18/2022    FINDINGS:  Images of the lower thorax are remarkable for minimal dependent atelectasis.    The liver is hypoattenuating suggesting steatosis, correlation with LFTs recommended.  Several low attenuating lesions are noted throughout the hepatic parenchyma, largest within the medial aspect of the right hepatic lobe measures 1.9 cm with attenuation suggesting cyst, the others are too small for characterization.  The spleen, pancreas, gallbladder and adrenal glands are grossly unremarkable.  The stomach is decompressed without wall thickening.  The portal vein, splenic vein, SMV, celiac axis and SMA all are patent.  No significant abdominal lymphadenopathy.    The kidneys enhance symmetrically without hydronephrosis or nephrolithiasis.  The bilateral ureters are unable to be followed in their entirety to the urinary bladder, no definite calculi seen or secondary findings to suggest obstructive uropathy.  The urinary bladder is decompressed without wall thickening.  The uterus is absent the adnexa is unremarkable.    The large bowel is  unremarkable.  The terminal ileum is unremarkable.  The appendix is not confidently identified, no pericecal inflammation.  The small bowel is grossly unremarkable.  There are a few scattered shotty periaortic and paracaval lymph nodes.  No focal organized pelvic fluid collection.    There are degenerative changes of the spine.  No significant inguinal lymphadenopathy.                                      In shared decision making with the patient, we discussed treatment, prescriptions, labs, and imaging results.  Reviewed chart.  On 04/12/2023 patient weight is recorded as 261 lb.  Day patient was placed on a scale and weighed 266 lb.  Had lengthy discussion with patient regarding healthy habits.  Also advised patient to start trying to walk.  Patient states she just stays in the house.  Advised patient to try to start walking.  Start with a few minutes a day and then increase as tolerated.  Discharge home with   ED Prescriptions       Medication Sig Dispense Start Date End Date Auth. Provider    ondansetron (ZOFRAN-ODT) 8 MG TbDL Take 1 tablet (8 mg total) by mouth every 6 (six) hours as needed (As needed for nausea). 20 tablet 4/28/2023 4/30/2023 Jen Nolan, DO    famotidine (PEPCID) 20 MG tablet Take 1 tablet (20 mg total) by mouth 2 (two) times daily. 20 tablet 4/28/2023 4/27/2024 Jen Nolan DO    promethazine (PHENERGAN) 25 MG suppository Place 1 suppository (25 mg total) rectally every 6 (six) hours as needed (Use if  nausea not controlled with oral medication). 10 suppository 4/28/2023 -- Jen Nolan, DO    acetaminophen (TYLENOL) 500 MG tablet Take 2 tablets (1,000 mg total) by mouth every 6 (six) hours as needed for Pain. 30 tablet 4/28/2023 -- Jen Nolan, DO    polyethylene glycol (GLYCOLAX) 17 gram/dose powder Take 17 g by mouth 2 (two) times daily as needed (As needed for constipation and abdominal bloating). 1,020 g 4/28/2023 5/28/2023 Jen Nolan, DO          Fill and take prescriptions as  directed.  Return to the ED if symptoms worsen or do not resolve.   Answered questions and discussed discharge plan.    Patient feels better and is ready for discharge.  Follow up with PCP/specialist in 1 day       Scribe Attestation:   Scribe #1: I performed the above scribed service and the documentation accurately describes the services I performed. I attest to the accuracy of the note.      ED Course as of 04/28/23 1807   Fri Apr 28, 2023   1144 EKG interpreted by Dr. Nolan.  No STEMI.  Sinus bradycardia ventricular rate 58.  Normal axis.  Aside from rate normal EKG.   when compared to prior EKG done on 04/18/2021 rate has decreased by 12 beats per minute today. [RF]   1157 POCT CBC [RF]      ED Course User Index  [RF] Jen Nolan DO           I, Dr. Jen Nolan, personally performed the services described in this documentation. This document was produced by a scribe under my direction and in my presence. All medical record entries made by the scribe were at my direction and in my presence.  I have reviewed the chart and agree that the record reflects my personal performance and is accurate and complete. Jen Nolan DO.     04/28/2023 6:07 PM         Clinical Impression:   Final diagnoses:  [R10.9] Abdominal pain  [R10.9, G89.29] Chronic abdominal pain (Primary)  [R63.5] Weight gain        ED Disposition Condition    Discharge Stable          ED Prescriptions       Medication Sig Dispense Start Date End Date Auth. Provider    ondansetron (ZOFRAN-ODT) 8 MG TbDL Take 1 tablet (8 mg total) by mouth every 6 (six) hours as needed (As needed for nausea). 20 tablet 4/28/2023 4/30/2023 Jen Nolan DO    famotidine (PEPCID) 20 MG tablet Take 1 tablet (20 mg total) by mouth 2 (two) times daily. 20 tablet 4/28/2023 4/27/2024 Jen Nolan DO    promethazine (PHENERGAN) 25 MG suppository Place 1 suppository (25 mg total) rectally every 6 (six) hours as needed (Use if  nausea not controlled with oral  medication). 10 suppository 4/28/2023 -- Jen Nolan DO    acetaminophen (TYLENOL) 500 MG tablet Take 2 tablets (1,000 mg total) by mouth every 6 (six) hours as needed for Pain. 30 tablet 4/28/2023 -- Jen Nolan DO    polyethylene glycol (GLYCOLAX) 17 gram/dose powder Take 17 g by mouth 2 (two) times daily as needed (As needed for constipation and abdominal bloating). 1,020 g 4/28/2023 5/28/2023 Jen Nolan DO          Follow-up Information       Follow up With Specialties Details Why Contact Info    Jaxson Oh MD Family Medicine Schedule an appointment as soon as possible for a visit in 1 day  [address]  [facility] [address]  [phone]      [facility] GASTROENTEROLOGY Gastroenterology Schedule an appointment as soon as possible for a visit in 1 day For further care and evaluation of your abdominal pain [address]  [facility] [address]  [phone]    [facility] - Emergency Dept Emergency Medicine Go to  Please go to [facility] emergency department if symptoms worsen [address]  [facility] [id number] [phone]             Jen Nolan DO  04/28/23 [id number]

## 2023-04-28 NOTE — Clinical Note
"Haydee "Haydee" Maxwell was seen and treated in our emergency department on 4/28/2023.  She may return to work on 04/30/2023.       If you have any questions or concerns, please don't hesitate to call.      Jen Nolan, DO"

## 2023-05-30 DIAGNOSIS — N76.1 CHRONIC VAGINITIS: ICD-10-CM

## 2023-05-30 RX ORDER — TERCONAZOLE 8 MG/G
CREAM VAGINAL
Qty: 20 G | Refills: 1 | Status: SHIPPED | OUTPATIENT
Start: 2023-05-30

## 2023-08-22 ENCOUNTER — TELEPHONE (OUTPATIENT)
Dept: HEPATOLOGY | Facility: CLINIC | Age: 53
End: 2023-08-22
Payer: MEDICAID

## 2023-08-22 DIAGNOSIS — K76.0 FATTY LIVER: Primary | ICD-10-CM

## 2023-08-22 NOTE — TELEPHONE ENCOUNTER
----- Message from Diego Hodge sent at 8/22/2023  1:18 PM CDT -----  Regarding: call back  Pt call to schedule appt pt states she in a lot of pain    Call

## 2023-08-22 NOTE — TELEPHONE ENCOUNTER
Returned call and scheduled labs and ultrasound for Thursday 8/24 at [facility]. Advised if her pain is worse to seek ER or her PCP

## 2023-08-24 ENCOUNTER — HOSPITAL ENCOUNTER (OUTPATIENT)
Dept: RADIOLOGY | Facility: HOSPITAL | Age: 53
Discharge: HOME | End: 2023-08-24
Attending: INTERNAL MEDICINE
Payer: MEDICAID

## 2023-08-24 DIAGNOSIS — K76.0 FATTY LIVER: ICD-10-CM

## 2023-08-24 PROCEDURE — 76700 US EXAM ABDOM COMPLETE: CPT | Mod: 26,,, | Performed by: STUDENT IN AN ORGANIZED HEALTH CARE EDUCATION/TRAINING PROGRAM

## 2023-08-24 PROCEDURE — 76700 US ABDOMEN COMPLETE: ICD-10-PCS | Mod: 26,,, | Performed by: STUDENT IN AN ORGANIZED HEALTH CARE EDUCATION/TRAINING PROGRAM

## 2023-08-24 PROCEDURE — 76700 US EXAM ABDOM COMPLETE: CPT | Mod: TC

## 2023-08-28 ENCOUNTER — TELEPHONE (OUTPATIENT)
Dept: HEPATOLOGY | Facility: CLINIC | Age: 53
End: 2023-08-28
Payer: MEDICAID

## 2023-08-28 NOTE — TELEPHONE ENCOUNTER
--Labs stable- let pt know --- Message from Iram Miguel MD sent at 8/24/2023  5:30 PM CDT -----  Abdo US stable-please let pt know    Spoke with pt and she says that her ankles and feet are swelling but has been eating lots of seafood lately, she was taking her husbands lasix and ran out. She is seeing her heaert dr arias 11/16

## 2023-11-16 ENCOUNTER — OFFICE VISIT (OUTPATIENT)
Dept: HEPATOLOGY | Facility: CLINIC | Age: 53
End: 2023-11-16
Payer: MEDICAID

## 2023-11-16 VITALS
BODY MASS INDEX: 53.92 KG/M2 | OXYGEN SATURATION: 95 % | WEIGHT: 293 LBS | SYSTOLIC BLOOD PRESSURE: 104 MMHG | DIASTOLIC BLOOD PRESSURE: 52 MMHG | RESPIRATION RATE: 19 BRPM | HEART RATE: 82 BPM | TEMPERATURE: 98 F | HEIGHT: 62 IN

## 2023-11-16 DIAGNOSIS — K76.89 HEPATIC CYST: ICD-10-CM

## 2023-11-16 DIAGNOSIS — K76.0 FATTY LIVER: Primary | ICD-10-CM

## 2023-11-16 DIAGNOSIS — K76.9 LIVER LESION: ICD-10-CM

## 2023-11-16 PROCEDURE — 99214 OFFICE O/P EST MOD 30 MIN: CPT | Mod: S$PBB,,, | Performed by: INTERNAL MEDICINE

## 2023-11-16 PROCEDURE — 99999 PR PBB SHADOW E&M-EST. PATIENT-LVL V: CPT | Mod: PBBFAC,,, | Performed by: INTERNAL MEDICINE

## 2023-11-16 PROCEDURE — 4010F PR ACE/ARB THEARPY RXD/TAKEN: ICD-10-PCS | Mod: CPTII,,, | Performed by: INTERNAL MEDICINE

## 2023-11-16 PROCEDURE — 1159F MED LIST DOCD IN RCRD: CPT | Mod: CPTII,,, | Performed by: INTERNAL MEDICINE

## 2023-11-16 PROCEDURE — 1160F PR REVIEW ALL MEDS BY PRESCRIBER/CLIN PHARMACIST DOCUMENTED: ICD-10-PCS | Mod: CPTII,,, | Performed by: INTERNAL MEDICINE

## 2023-11-16 PROCEDURE — 99214 PR OFFICE/OUTPT VISIT, EST, LEVL IV, 30-39 MIN: ICD-10-PCS | Mod: S$PBB,,, | Performed by: INTERNAL MEDICINE

## 2023-11-16 PROCEDURE — 3074F PR MOST RECENT SYSTOLIC BLOOD PRESSURE < 130 MM HG: ICD-10-PCS | Mod: CPTII,,, | Performed by: INTERNAL MEDICINE

## 2023-11-16 PROCEDURE — 3008F PR BODY MASS INDEX (BMI) DOCUMENTED: ICD-10-PCS | Mod: CPTII,,, | Performed by: INTERNAL MEDICINE

## 2023-11-16 PROCEDURE — 3078F PR MOST RECENT DIASTOLIC BLOOD PRESSURE < 80 MM HG: ICD-10-PCS | Mod: CPTII,,, | Performed by: INTERNAL MEDICINE

## 2023-11-16 PROCEDURE — 1160F RVW MEDS BY RX/DR IN RCRD: CPT | Mod: CPTII,,, | Performed by: INTERNAL MEDICINE

## 2023-11-16 PROCEDURE — 3008F BODY MASS INDEX DOCD: CPT | Mod: CPTII,,, | Performed by: INTERNAL MEDICINE

## 2023-11-16 PROCEDURE — 3074F SYST BP LT 130 MM HG: CPT | Mod: CPTII,,, | Performed by: INTERNAL MEDICINE

## 2023-11-16 PROCEDURE — 1159F PR MEDICATION LIST DOCUMENTED IN MEDICAL RECORD: ICD-10-PCS | Mod: CPTII,,, | Performed by: INTERNAL MEDICINE

## 2023-11-16 PROCEDURE — 4010F ACE/ARB THERAPY RXD/TAKEN: CPT | Mod: CPTII,,, | Performed by: INTERNAL MEDICINE

## 2023-11-16 PROCEDURE — 99215 OFFICE O/P EST HI 40 MIN: CPT | Mod: PBBFAC,PN | Performed by: INTERNAL MEDICINE

## 2023-11-16 PROCEDURE — 3078F DIAST BP <80 MM HG: CPT | Mod: CPTII,,, | Performed by: INTERNAL MEDICINE

## 2023-11-16 PROCEDURE — 99999 PR PBB SHADOW E&M-EST. PATIENT-LVL V: ICD-10-PCS | Mod: PBBFAC,,, | Performed by: INTERNAL MEDICINE

## 2023-11-16 RX ORDER — AMOXICILLIN AND CLAVULANATE POTASSIUM 875; 125 MG/1; MG/1
1 TABLET, FILM COATED ORAL 2 TIMES DAILY
COMMUNITY
Start: 2023-11-07 | End: 2023-11-17

## 2023-11-16 RX ORDER — LIDOCAINE 50 MG/G
PATCH TOPICAL
COMMUNITY
Start: 2023-10-27

## 2023-11-16 RX ORDER — ASPIRIN 325 MG
1 TABLET, DELAYED RELEASE (ENTERIC COATED) ORAL
COMMUNITY
Start: 2023-10-09

## 2023-11-16 RX ORDER — SACUBITRIL AND VALSARTAN 24; 26 MG/1; MG/1
1 TABLET, FILM COATED ORAL 2 TIMES DAILY
COMMUNITY
Start: 2023-10-23

## 2023-11-16 RX ORDER — FUROSEMIDE 40 MG/1
60 TABLET ORAL
COMMUNITY
Start: 2023-09-22

## 2023-11-16 NOTE — PROGRESS NOTES
HEPATOLOGY FOLLOW UP    Referring Physician: Jaxson Oh MD  Current Corresponding Physician: Jaxson Oh MD    Haydee Arreola is here for follow up of fatty liver and abnormal abdominal imaging. I first saw her in consultation 5/2/2021    HPI  Haydee Arreola is a 53 y.o. female who was evaluated in the ER 4/8/21 when she developed rectal bleeding. As part of her evaluation because she had abdominal pain, she had a ct scan of the abdomen/pelvis 4/18/21. This revealed: There are multiple small and too small characterize low attenuation lesions seen left lobe of the liver and caudate.  Some are too small to characterize others have imaging characteristics that of simple cysts. She has normal liver enzymes and thus, no obvious chronic liver disease. The ct scan also showed infection/inflammation of unclear etiology in the RLQ. Appendix was not visualized. She was diagosed with enteritis and given antibiotics.     Labs 2010 and 2013: normal liver enzymes (ALT, AST <25)     Mammogram: last one in [facility] 2018  Pap test: last one in [facility] 2018  Colonoscopy: none on file at [facility]    Interval History  Since Haydee Arreola's last few visits:    Her  passed away  Early 2022. They were  for 31 years. Since his passing she started drinking a lot of alcohol and she gained a lot of wt.    Labs 6/2/22: ALT 23, AST 29, ALKP 79, Tbil 0.3  Labs 8/24/23: ALT 21, AST 24, ALKP 96, Tbil 0.4 (since  dies is drinking 2 pints per day of [place])  BMI: 54.6    At her last visit 06/22 she had edema. To evaluate I ordered an MRE and 2D echo. For continued pain, I ordered and MRI:    MRI Abdomen pelvis w wo contrast 7/15/22: Liver: Enlarged measuring 19.5 cm.  Diffuse loss of parenchymal signal on out of phase imaging consistent with steatosis.  Few stable scattered simple and minimally complex hepatic cysts containing thin septation.  No suspicious enhancing lesion.  No contrast washout  or pseudo capsule.  Hepatic and portal veins are patent.AFP 3.2, CEA 6.5,  14, CA 19-9 <2.0  MRE 7/15/22: mild steatosis, F0 fibrosis  2D echo 7/5/22:.  The estimated ejection fraction is 55%; The left ventricle is mildly enlarged with concentric hypertrophy and normal systolic function  Normal left ventricular diastolic function.  Normal right ventricular size with normal right ventricular systolic function.  Mild left atrial enlargement.  Mild mitral regurgitation.  Normal central venous pressure (3 mmHg).  The estimated PA systolic pressure is 34 mmHg.    Colonoscopy 6/9/21: hemorrhoids  Mammogram 6/21: normal  Pap 5/18/21: negative     The patient denies cognitive problems that would suggest hepatic encephalopathy, or GI bleeding from varices. Edema and abdominal pain have improved.    Outpatient Encounter Medications as of 11/16/2023   Medication Sig Dispense Refill    albuterol (ACCUNEB) 0.63 mg/3 mL Nebu Take 0.63 mg by nebulization every 6 (six) hours as needed.      albuterol-ipratropium (DUO-NEB) 2.5 mg-0.5 mg/3 mL nebulizer solution Inhale 3 mLs into the lungs.      albuterol-ipratropium (DUO-NEB) 2.5 mg-0.5 mg/3 mL nebulizer solution SMARTSIG:3 Milliliter(s) Via Nebulizer Every 6 Hours      ALPRAZolam (XANAX) 1 MG tablet Take 1 mg by mouth 2 (two) times daily.      amLODIPine (NORVASC) 10 MG tablet Take 10 mg by mouth once daily.  2    amoxicillin-clavulanate 875-125mg (AUGMENTIN) 875-125 mg per tablet Take 1 tablet by mouth 2 (two) times daily.      benzonatate (TESSALON) 200 MG capsule TAKE 1 CAPSULE BY MOUTH THREE TIMES A DAY AS NEEDED FOR COUGH  0    cholecalciferol, vitamin D3, 1,250 mcg (50,000 unit) capsule Take 1 capsule by mouth every 7 days.      clotrimazole-betamethasone 1-0.05% (LOTRISONE) cream APPLY TO AFFECTED AREA TWICE DAILY 15 g 2    cyclobenzaprine (FLEXERIL) 10 MG tablet       ENTRESTO 24-26 mg per tablet Take 1 tablet by mouth 2 (two) times daily.      fluconazole (DIFLUCAN)  150 MG Tab Take 1 tablet (150 mg total) by mouth as needed (vaginal yeast infection). Take one pill weekly as needed for yeast infection. 6 tablet 0    furosemide (LASIX) 40 MG tablet Take 60 mg by mouth.      HYDROcodone-acetaminophen (NORCO)  mg per tablet Take 1 tablet by mouth.      ibuprofen (ADVIL,MOTRIN) 600 MG tablet Take 1 tablet (600 mg total) by mouth every 6 (six) hours. 20 tablet 0    LIDOcaine (LIDODERM) 5 % SMARTSI Patch(s) Topical      loratadine 10 mg Cap loratadine Take No date recorded No form recorded No frequency recorded No route recorded No set duration recorded No set duration amount recorded active No dosage strength recorded No dosage strength units of measure recorded      meloxicam (MOBIC) 15 MG tablet Take 15 mg by mouth daily as needed.      metoprolol succinate (TOPROL-XL) 25 MG 24 hr tablet Take 25 mg by mouth once daily.      nystatin (MYCOSTATIN) 100,000 unit/mL suspension nystatin Take No date recorded No form recorded No frequency recorded No route recorded No set duration recorded No set duration amount recorded active No dosage strength recorded No dosage strength units of measure recorded      omeprazole (PRILOSEC) 40 MG capsule Take 40 mg by mouth once daily.  5    potassium chloride (KLOR-CON) 10 MEQ TbSR Take 10 mEq by mouth once daily.      rosuvastatin (CRESTOR) 10 MG tablet Take 10 mg by mouth once daily.      sertraline (ZOLOFT) 100 MG tablet Take 100 mg by mouth once daily.      spironolactone (ALDACTONE) 25 MG tablet spironolactone Take No date recorded No form recorded No frequency recorded No route recorded No set duration recorded No set duration amount recorded active No dosage strength recorded No dosage strength units of measure recorded      VENTOLIN HFA 90 mcg/actuation inhaler 2 puffs every 4 to 6 hours as needed.  4    acetaminophen (TYLENOL) 500 MG tablet Take 2 tablets (1,000 mg total) by mouth every 6 (six) hours as needed for Pain. (Patient not  taking: Reported on 11/16/2023) 30 tablet 0    ADVAIR DISKUS 250-50 mcg/dose diskus inhaler Inhale 1 puff into the lungs 2 (two) times daily.      alprazolam (XANAX) 2 MG Tab Take 2 mg by mouth 2 (two) times daily.  0    docusate sodium (COLACE) 100 MG capsule Take 1 capsule (100 mg total) by mouth 2 (two) times daily as needed for Constipation. (Patient not taking: Reported on 6/22/2022) 30 capsule 0    famotidine (PEPCID) 20 MG tablet Take 1 tablet (20 mg total) by mouth 2 (two) times daily. (Patient not taking: Reported on 11/16/2023) 20 tablet 0    hydroCHLOROthiazide (HYDRODIURIL) 12.5 MG Tab Take 12.5 mg by mouth once daily.      hydroCHLOROthiazide (HYDRODIURIL) 25 MG tablet Take 25 mg by mouth.      hydroCHLOROthiazide (HYDRODIURIL) 25 MG tablet Take 25 mg by mouth once daily.      montelukast (SINGULAIR) 10 mg tablet montelukast Take No date recorded No form recorded No frequency recorded No route recorded No set duration recorded No set duration amount recorded active No dosage strength recorded No dosage strength units of measure recorded      neomycin-polymyxin-hydrocortisone (CORTISPORIN) otic solution INT 3 GTS IN AU QID FOR 10 DAYS      nitroGLYCERIN (NITROSTAT) 0.4 MG SL tablet DISSOLVE 1 TABLET UNDER THE TONGUE EVERY 5 MINS AS NEEDED FOR CHEST PAIN FOR UP TO 3 DOSES. CALL 911 IF NO RELIEF      nystatin-triamcinolone (MYCOLOG II) cream Apply to affected area 2 times daily (Patient not taking: No sig reported) 30 g 1    potassium chloride (MICRO-K) 10 MEQ CpSR potassium chloride Take No date recorded No form recorded No frequency recorded No route recorded No set duration recorded No set duration amount recorded active No dosage strength recorded No dosage strength units of measure recorded      potassium chloride (MICRO-K) 10 MEQ CpSR Take 10 mEq by mouth.      promethazine (PHENERGAN) 25 MG suppository Place 1 suppository (25 mg total) rectally every 6 (six) hours as needed (Use if  nausea not  controlled with oral medication). (Patient not taking: Reported on 11/16/2023) 10 suppository 0    sertraline (ZOLOFT) 25 MG tablet Take 100 mg by mouth once daily.       terconazole (TERAZOL 3) 0.8 % vaginal cream INSERT 1 APPLICATORFUL VAGINALLY AT BEDTIME (Patient not taking: Reported on 11/16/2023) 20 g 1    tiotropium (SPIRIVA) 18 mcg inhalation capsule Inhale 18 mcg into the lungs once daily. Controller       Facility-Administered Encounter Medications as of 11/16/2023   Medication Dose Route Frequency Provider Last Rate Last Admin    estradiol valerate injection 20 mg  20 mg Intramuscular Q30 Days Yuri Walden MD   20 mg at 09/16/21 1414     Review of patient's allergies indicates:   Allergen Reactions    Tramadol Swelling and Anaphylaxis     Throat swelling  Throat swelling    Codeine Other (See Comments)     Unknown reaction  Unknown reaction    Ketorolac Nausea Only     Past Medical History:   Diagnosis Date    Abdominal pain 6/22/2022    Anemia     Anesthesia     PT HAS BULGING DISC/ HERNIATED  DISC C2-C7    Anxiety     Arthritis     RA    Asthma     Back problem     Blood transfusion     Cervical pain     COPD (chronic obstructive pulmonary disease)     CTS (carpal tunnel syndrome)     Edema 6/22/2022    Fatty liver 5/31/2021    GERD (gastroesophageal reflux disease)     Joint pain     shoulder    Joint pain     knee    Liver lesion 5/2/2021    Lumbar pain     Migraine     Neck pain     Panic attacks     Rotator cuff tear     RIGHT    Sciatica     Thyroid disease        Review of Systems   Constitutional: Negative.    HENT: Negative.    Eyes: Negative.    Respiratory: Negative.    Cardiovascular: Negative.    Gastrointestinal: Negative.    Genitourinary: Negative.    Musculoskeletal: Negative.    Skin: Negative.    Neurological: Negative.    Psychiatric/Behavioral: Negative.      Vitals:    11/16/23 0951   BP: (!) 104/52   Pulse: 82   Resp: 19   Temp: 98.2 °F (36.8 °C)          Physical Exam  Vitals  reviewed.   Constitutional:       Appearance: She is well-developed.   HENT:      Head: Normocephalic and atraumatic.   Eyes:      General: No scleral icterus.     Conjunctiva/sclera: Conjunctivae normal.      Pupils: Pupils are equal, round, and reactive to light.   Neck:      Thyroid: No thyromegaly.   Cardiovascular:      Rate and Rhythm: Normal rate and regular rhythm.      Heart sounds: Normal heart sounds.   Pulmonary:      Effort: Pulmonary effort is normal.      Breath sounds: Normal breath sounds. No rales.   Abdominal:      General: Bowel sounds are normal. There is no distension.      Palpations: Abdomen is soft. There is no mass.      Tenderness: There is no abdominal tenderness.   Musculoskeletal:         General: Normal range of motion.      Cervical back: Normal range of motion and neck supple.   Skin:     General: Skin is warm and dry.      Findings: No rash.   Neurological:      Mental Status: She is alert and oriented to person, place, and time.         MELD 3.0: 7 at 8/24/2023  9:25 AM  MELD-Na: 6 at 8/24/2023  9:25 AM  Calculated from:  Serum Creatinine: 0.9 mg/dL (Using min of 1 mg/dL) at 8/24/2023  9:25 AM  Serum Sodium: 142 mmol/L (Using max of 137 mmol/L) at 8/24/2023  9:25 AM  Total Bilirubin: 0.4 mg/dL (Using min of 1 mg/dL) at 8/24/2023  9:25 AM  Serum Albumin: 4.1 g/dL (Using max of 3.5 g/dL) at 8/24/2023  9:25 AM  INR(ratio): 0.9 (Using min of 1) at 8/24/2023  9:25 AM  Age at listing (hypothetical): 52 years  Sex: Female at 8/24/2023  9:25 AM      Lab Results   Component Value Date    GLU 90 08/24/2023    BUN 28 (H) 08/24/2023    CREATININE 0.9 08/24/2023    CALCIUM 9.5 08/24/2023     08/24/2023    K 4.4 08/24/2023     08/24/2023    PROT 7.2 08/24/2023    CO2 28 08/24/2023    ANIONGAP 9 08/24/2023    WBC 7.17 08/24/2023    RBC 4.36 08/24/2023    HGB 13.8 08/24/2023    HCT 42.2 08/24/2023    MCV 97 08/24/2023    MCH 31.7 (H) 08/24/2023    MCHC 32.7 08/24/2023     Lab Results    Component Value Date    RDW 14.1 08/24/2023     08/24/2023    MPV 9.3 08/24/2023    GRAN 5.0 08/24/2023    GRAN 69.6 08/24/2023    LYMPH 1.4 08/24/2023    LYMPH 19.7 08/24/2023    MONO 0.5 08/24/2023    MONO 7.5 08/24/2023    EOSINOPHIL 1.8 08/24/2023    BASOPHIL 1.0 08/24/2023    EOS 0.1 08/24/2023    BASO 0.07 08/24/2023    APTT 26.4 04/12/2010    GROUPTRH AB POS 11/16/2013    GROUPTRH AB POS 04/12/2010    ALBUMIN 4.1 08/24/2023    BILIDIR 0.2 08/24/2023    AST 24 08/24/2023    ALT 21 08/24/2023    ALKPHOS 96 08/24/2023    LABPROT 9.8 08/24/2023    INR 0.9 08/24/2023    INR 1.1 04/28/2023       Assessment and Plan:  Haydee Arreola is a 53 y.o. female with a fatty liver. CT suggestive of cysts. MRI shows no obvious cirrhosis (normal liver enzymes continue) and no solid liver lesions, only benign hepatic cysts, which are stable on repeat imaging. MRE does not show any significant fibrosis. 2D echo is satisfactory.     Unfortunately started drinking alcohol, large amounts. Pt has declined rehab at the present time. I have recommended pt do labs every 3 months and and abdo US in 6 months                              Return 6 months  A total of 35 min were spent reviewing the chart, labs, imaging, examining the patient and reviewing the case with the pt's patient care team

## 2024-01-21 ENCOUNTER — HOSPITAL ENCOUNTER (INPATIENT)
Facility: HOSPITAL | Age: 54
LOS: 1 days | Discharge: LEFT BEFORE DISCHARGE | DRG: 190 | End: 2024-01-22
Attending: EMERGENCY MEDICINE | Admitting: STUDENT IN AN ORGANIZED HEALTH CARE EDUCATION/TRAINING PROGRAM
Payer: MEDICAID

## 2024-01-21 DIAGNOSIS — J44.1 COPD WITH ACUTE EXACERBATION: Primary | ICD-10-CM

## 2024-01-21 DIAGNOSIS — R55 SYNCOPE: ICD-10-CM

## 2024-01-21 DIAGNOSIS — R06.02 SHORTNESS OF BREATH: ICD-10-CM

## 2024-01-21 DIAGNOSIS — F10.929 ACUTE ALCOHOLIC INTOXICATION WITH COMPLICATION: ICD-10-CM

## 2024-01-21 DIAGNOSIS — R40.20 LOSS OF CONSCIOUSNESS: ICD-10-CM

## 2024-01-21 PROBLEM — E66.01 MORBID OBESITY WITH BMI OF 50.0-59.9, ADULT: Status: ACTIVE | Noted: 2024-01-21

## 2024-01-21 PROBLEM — N17.9 AKI (ACUTE KIDNEY INJURY): Status: ACTIVE | Noted: 2024-01-21

## 2024-01-21 LAB
ALBUMIN SERPL BCP-MCNC: 4.4 G/DL (ref 3.5–5.2)
ALP SERPL-CCNC: 107 U/L (ref 55–135)
ALT SERPL W/O P-5'-P-CCNC: 19 U/L (ref 10–44)
AMPHET+METHAMPHET UR QL: NEGATIVE
ANION GAP SERPL CALC-SCNC: 15 MMOL/L (ref 8–16)
AST SERPL-CCNC: 13 U/L (ref 10–40)
BARBITURATES UR QL SCN>200 NG/ML: NEGATIVE
BASOPHILS # BLD AUTO: 0.02 K/UL (ref 0–0.2)
BASOPHILS NFR BLD: 0.2 % (ref 0–1.9)
BENZODIAZ UR QL SCN>200 NG/ML: ABNORMAL
BILIRUB SERPL-MCNC: 0.3 MG/DL (ref 0.1–1)
BILIRUB UR QL STRIP: NEGATIVE
BNP SERPL-MCNC: 122 PG/ML (ref 0–99)
BUN SERPL-MCNC: 23 MG/DL (ref 6–20)
BZE UR QL SCN: NEGATIVE
CALCIUM SERPL-MCNC: 9.5 MG/DL (ref 8.7–10.5)
CANNABINOIDS UR QL SCN: NEGATIVE
CHLORIDE SERPL-SCNC: 105 MMOL/L (ref 95–110)
CLARITY UR REFRACT.AUTO: CLEAR
CO2 SERPL-SCNC: 22 MMOL/L (ref 23–29)
COLOR UR AUTO: YELLOW
CREAT SERPL-MCNC: 1.2 MG/DL (ref 0.5–1.4)
CREAT UR-MCNC: 80 MG/DL (ref 15–325)
DIFFERENTIAL METHOD BLD: ABNORMAL
EOSINOPHIL # BLD AUTO: 0 K/UL (ref 0–0.5)
EOSINOPHIL NFR BLD: 0.1 % (ref 0–8)
ERYTHROCYTE [DISTWIDTH] IN BLOOD BY AUTOMATED COUNT: 15.6 % (ref 11.5–14.5)
EST. GFR  (NO RACE VARIABLE): 53.5 ML/MIN/1.73 M^2
ETHANOL SERPL-MCNC: 229 MG/DL
GLUCOSE SERPL-MCNC: 154 MG/DL (ref 70–110)
GLUCOSE UR QL STRIP: NEGATIVE
HCT VFR BLD AUTO: 42.4 % (ref 37–48.5)
HGB BLD-MCNC: 14 G/DL (ref 12–16)
HGB UR QL STRIP: NEGATIVE
IMM GRANULOCYTES # BLD AUTO: 0.1 K/UL (ref 0–0.04)
IMM GRANULOCYTES NFR BLD AUTO: 0.8 % (ref 0–0.5)
KETONES UR QL STRIP: NEGATIVE
LACTATE SERPL-SCNC: 3.4 MMOL/L (ref 0.5–2.2)
LEUKOCYTE ESTERASE UR QL STRIP: NEGATIVE
LYMPHOCYTES # BLD AUTO: 0.7 K/UL (ref 1–4.8)
LYMPHOCYTES NFR BLD: 5.7 % (ref 18–48)
MAGNESIUM SERPL-MCNC: 2.2 MG/DL (ref 1.6–2.6)
MCH RBC QN AUTO: 32.5 PG (ref 27–31)
MCHC RBC AUTO-ENTMCNC: 33 G/DL (ref 32–36)
MCV RBC AUTO: 98 FL (ref 82–98)
METHADONE UR QL SCN>300 NG/ML: NEGATIVE
MONOCYTES # BLD AUTO: 0.4 K/UL (ref 0.3–1)
MONOCYTES NFR BLD: 2.9 % (ref 4–15)
NEUTROPHILS # BLD AUTO: 11.4 K/UL (ref 1.8–7.7)
NEUTROPHILS NFR BLD: 90.3 % (ref 38–73)
NITRITE UR QL STRIP: NEGATIVE
NRBC BLD-RTO: 0 /100 WBC
OPIATES UR QL SCN: ABNORMAL
PCP UR QL SCN>25 NG/ML: NEGATIVE
PH UR STRIP: 6 [PH] (ref 5–8)
PHOSPHATE SERPL-MCNC: 3.4 MG/DL (ref 2.7–4.5)
PLATELET # BLD AUTO: 235 K/UL (ref 150–450)
PMV BLD AUTO: 10.1 FL (ref 9.2–12.9)
POTASSIUM SERPL-SCNC: 3.2 MMOL/L (ref 3.5–5.1)
PROCALCITONIN SERPL IA-MCNC: 0.02 NG/ML
PROT SERPL-MCNC: 7.5 G/DL (ref 6–8.4)
PROT UR QL STRIP: NEGATIVE
RBC # BLD AUTO: 4.31 M/UL (ref 4–5.4)
SODIUM SERPL-SCNC: 142 MMOL/L (ref 136–145)
SP GR UR STRIP: 1.01 (ref 1–1.03)
TOXICOLOGY INFORMATION: ABNORMAL
TROPONIN I SERPL DL<=0.01 NG/ML-MCNC: <0.006 NG/ML (ref 0–0.03)
TSH SERPL DL<=0.005 MIU/L-ACNC: 1.58 UIU/ML (ref 0.4–4)
URN SPEC COLLECT METH UR: NORMAL
WBC # BLD AUTO: 12.6 K/UL (ref 3.9–12.7)

## 2024-01-21 PROCEDURE — 25000003 PHARM REV CODE 250: Performed by: NURSE PRACTITIONER

## 2024-01-21 PROCEDURE — 83605 ASSAY OF LACTIC ACID: CPT

## 2024-01-21 PROCEDURE — 99900035 HC TECH TIME PER 15 MIN (STAT)

## 2024-01-21 PROCEDURE — 82077 ASSAY SPEC XCP UR&BREATH IA: CPT | Performed by: EMERGENCY MEDICINE

## 2024-01-21 PROCEDURE — 82077 ASSAY SPEC XCP UR&BREATH IA: CPT | Mod: 91 | Performed by: NURSE PRACTITIONER

## 2024-01-21 PROCEDURE — 96361 HYDRATE IV INFUSION ADD-ON: CPT

## 2024-01-21 PROCEDURE — 80053 COMPREHEN METABOLIC PANEL: CPT | Performed by: EMERGENCY MEDICINE

## 2024-01-21 PROCEDURE — 83605 ASSAY OF LACTIC ACID: CPT | Performed by: EMERGENCY MEDICINE

## 2024-01-21 PROCEDURE — 84484 ASSAY OF TROPONIN QUANT: CPT | Performed by: EMERGENCY MEDICINE

## 2024-01-21 PROCEDURE — 84443 ASSAY THYROID STIM HORMONE: CPT | Performed by: EMERGENCY MEDICINE

## 2024-01-21 PROCEDURE — 81003 URINALYSIS AUTO W/O SCOPE: CPT | Mod: 59 | Performed by: NURSE PRACTITIONER

## 2024-01-21 PROCEDURE — 63600175 PHARM REV CODE 636 W HCPCS: Performed by: NURSE PRACTITIONER

## 2024-01-21 PROCEDURE — 25000003 PHARM REV CODE 250: Performed by: EMERGENCY MEDICINE

## 2024-01-21 PROCEDURE — 83735 ASSAY OF MAGNESIUM: CPT | Performed by: EMERGENCY MEDICINE

## 2024-01-21 PROCEDURE — 93010 ELECTROCARDIOGRAM REPORT: CPT | Mod: ,,, | Performed by: INTERNAL MEDICINE

## 2024-01-21 PROCEDURE — 84100 ASSAY OF PHOSPHORUS: CPT | Performed by: EMERGENCY MEDICINE

## 2024-01-21 PROCEDURE — 94761 N-INVAS EAR/PLS OXIMETRY MLT: CPT

## 2024-01-21 PROCEDURE — 84145 PROCALCITONIN (PCT): CPT | Performed by: NURSE PRACTITIONER

## 2024-01-21 PROCEDURE — 93005 ELECTROCARDIOGRAM TRACING: CPT

## 2024-01-21 PROCEDURE — 63600175 PHARM REV CODE 636 W HCPCS: Performed by: EMERGENCY MEDICINE

## 2024-01-21 PROCEDURE — 27000221 HC OXYGEN, UP TO 24 HOURS

## 2024-01-21 PROCEDURE — G0378 HOSPITAL OBSERVATION PER HR: HCPCS

## 2024-01-21 PROCEDURE — 25000242 PHARM REV CODE 250 ALT 637 W/ HCPCS: Performed by: EMERGENCY MEDICINE

## 2024-01-21 PROCEDURE — 85025 COMPLETE CBC W/AUTO DIFF WBC: CPT | Performed by: EMERGENCY MEDICINE

## 2024-01-21 PROCEDURE — 80307 DRUG TEST PRSMV CHEM ANLYZR: CPT | Performed by: EMERGENCY MEDICINE

## 2024-01-21 PROCEDURE — 82803 BLOOD GASES ANY COMBINATION: CPT

## 2024-01-21 PROCEDURE — 96372 THER/PROPH/DIAG INJ SC/IM: CPT | Performed by: NURSE PRACTITIONER

## 2024-01-21 PROCEDURE — 12000002 HC ACUTE/MED SURGE SEMI-PRIVATE ROOM

## 2024-01-21 PROCEDURE — 94640 AIRWAY INHALATION TREATMENT: CPT | Mod: XB

## 2024-01-21 PROCEDURE — 96375 TX/PRO/DX INJ NEW DRUG ADDON: CPT

## 2024-01-21 PROCEDURE — 99285 EMERGENCY DEPT VISIT HI MDM: CPT | Mod: 25

## 2024-01-21 PROCEDURE — 96365 THER/PROPH/DIAG IV INF INIT: CPT

## 2024-01-21 PROCEDURE — 83880 ASSAY OF NATRIURETIC PEPTIDE: CPT | Performed by: EMERGENCY MEDICINE

## 2024-01-21 RX ORDER — ONDANSETRON HYDROCHLORIDE 2 MG/ML
4 INJECTION, SOLUTION INTRAVENOUS EVERY 12 HOURS PRN
Status: DISCONTINUED | OUTPATIENT
Start: 2024-01-21 | End: 2024-01-22 | Stop reason: HOSPADM

## 2024-01-21 RX ORDER — AZITHROMYCIN 250 MG/1
500 TABLET, FILM COATED ORAL EVERY 24 HOURS
Status: DISCONTINUED | OUTPATIENT
Start: 2024-01-22 | End: 2024-01-22 | Stop reason: HOSPADM

## 2024-01-21 RX ORDER — ACETAMINOPHEN 500 MG
1000 TABLET ORAL EVERY 8 HOURS PRN
Status: DISCONTINUED | OUTPATIENT
Start: 2024-01-21 | End: 2024-01-22 | Stop reason: HOSPADM

## 2024-01-21 RX ORDER — ATORVASTATIN CALCIUM 40 MG/1
40 TABLET, FILM COATED ORAL NIGHTLY
Status: DISCONTINUED | OUTPATIENT
Start: 2024-01-21 | End: 2024-01-22 | Stop reason: HOSPADM

## 2024-01-21 RX ORDER — METHYLPREDNISOLONE SOD SUCC 125 MG
125 VIAL (EA) INJECTION
Status: COMPLETED | OUTPATIENT
Start: 2024-01-21 | End: 2024-01-21

## 2024-01-21 RX ORDER — GLUCAGON 1 MG
1 KIT INJECTION
Status: DISCONTINUED | OUTPATIENT
Start: 2024-01-21 | End: 2024-01-22 | Stop reason: HOSPADM

## 2024-01-21 RX ORDER — POTASSIUM CHLORIDE 20 MEQ/1
40 TABLET, EXTENDED RELEASE ORAL
Status: COMPLETED | OUTPATIENT
Start: 2024-01-21 | End: 2024-01-21

## 2024-01-21 RX ORDER — ENOXAPARIN SODIUM 100 MG/ML
60 INJECTION SUBCUTANEOUS EVERY 12 HOURS
Status: DISCONTINUED | OUTPATIENT
Start: 2024-01-21 | End: 2024-01-22 | Stop reason: HOSPADM

## 2024-01-21 RX ORDER — MONTELUKAST SODIUM 10 MG/1
10 TABLET ORAL NIGHTLY
Status: DISCONTINUED | OUTPATIENT
Start: 2024-01-21 | End: 2024-01-22 | Stop reason: HOSPADM

## 2024-01-21 RX ORDER — ACETAMINOPHEN 325 MG/1
650 TABLET ORAL EVERY 6 HOURS PRN
Status: DISCONTINUED | OUTPATIENT
Start: 2024-01-21 | End: 2024-01-22 | Stop reason: HOSPADM

## 2024-01-21 RX ORDER — IPRATROPIUM BROMIDE AND ALBUTEROL SULFATE 2.5; .5 MG/3ML; MG/3ML
3 SOLUTION RESPIRATORY (INHALATION)
Status: COMPLETED | OUTPATIENT
Start: 2024-01-21 | End: 2024-01-21

## 2024-01-21 RX ORDER — FLUTICASONE FUROATE AND VILANTEROL 100; 25 UG/1; UG/1
1 POWDER RESPIRATORY (INHALATION) DAILY
Status: DISCONTINUED | OUTPATIENT
Start: 2024-01-22 | End: 2024-01-22 | Stop reason: HOSPADM

## 2024-01-21 RX ORDER — PANTOPRAZOLE SODIUM 40 MG/1
40 TABLET, DELAYED RELEASE ORAL DAILY
Status: DISCONTINUED | OUTPATIENT
Start: 2024-01-22 | End: 2024-01-22 | Stop reason: HOSPADM

## 2024-01-21 RX ORDER — IBUPROFEN 200 MG
24 TABLET ORAL
Status: DISCONTINUED | OUTPATIENT
Start: 2024-01-21 | End: 2024-01-22 | Stop reason: HOSPADM

## 2024-01-21 RX ORDER — ALPRAZOLAM 0.5 MG/1
0.5 TABLET ORAL 3 TIMES DAILY PRN
Status: DISCONTINUED | OUTPATIENT
Start: 2024-01-21 | End: 2024-01-22

## 2024-01-21 RX ORDER — SODIUM CHLORIDE 0.9 % (FLUSH) 0.9 %
3 SYRINGE (ML) INJECTION
Status: DISCONTINUED | OUTPATIENT
Start: 2024-01-21 | End: 2024-01-22 | Stop reason: HOSPADM

## 2024-01-21 RX ORDER — IPRATROPIUM BROMIDE AND ALBUTEROL SULFATE 2.5; .5 MG/3ML; MG/3ML
3 SOLUTION RESPIRATORY (INHALATION)
Status: DISCONTINUED | OUTPATIENT
Start: 2024-01-22 | End: 2024-01-22 | Stop reason: HOSPADM

## 2024-01-21 RX ORDER — AMOXICILLIN 250 MG
1 CAPSULE ORAL 2 TIMES DAILY
Status: DISCONTINUED | OUTPATIENT
Start: 2024-01-21 | End: 2024-01-22

## 2024-01-21 RX ORDER — IBUPROFEN 200 MG
16 TABLET ORAL
Status: DISCONTINUED | OUTPATIENT
Start: 2024-01-21 | End: 2024-01-22 | Stop reason: HOSPADM

## 2024-01-21 RX ADMIN — IPRATROPIUM BROMIDE AND ALBUTEROL SULFATE 3 ML: .5; 3 SOLUTION RESPIRATORY (INHALATION) at 08:01

## 2024-01-21 RX ADMIN — POTASSIUM CHLORIDE 40 MEQ: 1500 TABLET, EXTENDED RELEASE ORAL at 10:01

## 2024-01-21 RX ADMIN — SODIUM CHLORIDE, POTASSIUM CHLORIDE, SODIUM LACTATE AND CALCIUM CHLORIDE 1000 ML: 600; 310; 30; 20 INJECTION, SOLUTION INTRAVENOUS at 09:01

## 2024-01-21 RX ADMIN — IPRATROPIUM BROMIDE AND ALBUTEROL SULFATE 3 ML: .5; 3 SOLUTION RESPIRATORY (INHALATION) at 07:01

## 2024-01-21 RX ADMIN — MONTELUKAST 10 MG: 10 TABLET, FILM COATED ORAL at 10:01

## 2024-01-21 RX ADMIN — METHYLPREDNISOLONE SODIUM SUCCINATE 125 MG: 125 INJECTION, POWDER, FOR SOLUTION INTRAMUSCULAR; INTRAVENOUS at 08:01

## 2024-01-21 RX ADMIN — CEFTRIAXONE SODIUM 1 G: 1 INJECTION, POWDER, FOR SOLUTION INTRAMUSCULAR; INTRAVENOUS at 11:01

## 2024-01-21 RX ADMIN — ENOXAPARIN SODIUM 60 MG: 60 INJECTION SUBCUTANEOUS at 11:01

## 2024-01-22 VITALS
OXYGEN SATURATION: 93 % | WEIGHT: 293 LBS | BODY MASS INDEX: 53.92 KG/M2 | TEMPERATURE: 97 F | DIASTOLIC BLOOD PRESSURE: 77 MMHG | RESPIRATION RATE: 22 BRPM | SYSTOLIC BLOOD PRESSURE: 165 MMHG | HEART RATE: 75 BPM | HEIGHT: 62 IN

## 2024-01-22 PROBLEM — M19.90 OSTEOARTHRITIS: Status: ACTIVE | Noted: 2024-01-22

## 2024-01-22 PROBLEM — E78.5 HLD (HYPERLIPIDEMIA): Status: ACTIVE | Noted: 2024-01-22

## 2024-01-22 PROBLEM — J44.1 CHRONIC OBSTRUCTIVE PULMONARY DISEASE WITH ACUTE EXACERBATION: Status: ACTIVE | Noted: 2023-04-28

## 2024-01-22 PROBLEM — J96.01 ACUTE HYPOXEMIC RESPIRATORY FAILURE: Status: ACTIVE | Noted: 2024-01-22

## 2024-01-22 PROBLEM — F10.10 ETOH ABUSE: Status: ACTIVE | Noted: 2024-01-22

## 2024-01-22 PROBLEM — I50.32 CHRONIC DIASTOLIC CONGESTIVE HEART FAILURE: Status: ACTIVE | Noted: 2024-01-22

## 2024-01-22 PROBLEM — F17.210 CIGARETTE NICOTINE DEPENDENCE WITHOUT COMPLICATION: Status: ACTIVE | Noted: 2024-01-22

## 2024-01-22 PROBLEM — E87.6 HYPOKALEMIA: Status: ACTIVE | Noted: 2024-01-22

## 2024-01-22 PROBLEM — I10 HTN (HYPERTENSION): Status: ACTIVE | Noted: 2024-01-22

## 2024-01-22 LAB
ALLENS TEST: ABNORMAL
ALLENS TEST: ABNORMAL
ANION GAP SERPL CALC-SCNC: 10 MMOL/L (ref 8–16)
BASOPHILS # BLD AUTO: 0.03 K/UL (ref 0–0.2)
BASOPHILS NFR BLD: 0.3 % (ref 0–1.9)
BSA FOR ECHO PROCEDURE: 2.49 M2
BUN SERPL-MCNC: 26 MG/DL (ref 6–20)
CALCIUM SERPL-MCNC: 9.6 MG/DL (ref 8.7–10.5)
CHLORIDE SERPL-SCNC: 113 MMOL/L (ref 95–110)
CO2 SERPL-SCNC: 22 MMOL/L (ref 23–29)
CREAT SERPL-MCNC: 1.2 MG/DL (ref 0.5–1.4)
CV ECHO LV RWT: 0.29 CM
DIFFERENTIAL METHOD BLD: ABNORMAL
DOP CALC LVOT AREA: 3.5 CM2
DOP CALC LVOT DIAMETER: 2.1 CM
ECHO LV POSTERIOR WALL: 0.8 CM (ref 0.6–1.1)
EOSINOPHIL # BLD AUTO: 0 K/UL (ref 0–0.5)
EOSINOPHIL NFR BLD: 0 % (ref 0–8)
ERYTHROCYTE [DISTWIDTH] IN BLOOD BY AUTOMATED COUNT: 16.1 % (ref 11.5–14.5)
EST. GFR  (NO RACE VARIABLE): 53.5 ML/MIN/1.73 M^2
ETHANOL SERPL-MCNC: 123 MG/DL
FRACTIONAL SHORTENING: 27 % (ref 28–44)
GLUCOSE SERPL-MCNC: 166 MG/DL (ref 70–110)
HCO3 UR-SCNC: 24.2 MMOL/L (ref 24–28)
HCT VFR BLD AUTO: 41.3 % (ref 37–48.5)
HGB BLD-MCNC: 13.3 G/DL (ref 12–16)
IMM GRANULOCYTES # BLD AUTO: 0.09 K/UL (ref 0–0.04)
IMM GRANULOCYTES NFR BLD AUTO: 1 % (ref 0–0.5)
INTERVENTRICULAR SEPTUM: 0.8 CM (ref 0.6–1.1)
LA MAJOR: 6.1 CM
LA MINOR: 6.1 CM
LA WIDTH: 4.5 CM
LACTATE SERPL-SCNC: 2.3 MMOL/L (ref 0.5–2.2)
LDH SERPL L TO P-CCNC: 3.59 MMOL/L (ref 0.5–2.2)
LEFT ATRIUM SIZE: 4 CM
LEFT ATRIUM VOLUME INDEX: 40.4 ML/M2
LEFT ATRIUM VOLUME: 93.33 CM3
LEFT INTERNAL DIMENSION IN SYSTOLE: 4 CM (ref 2.1–4)
LEFT VENTRICLE MASS INDEX: 69 G/M2
LEFT VENTRICULAR INTERNAL DIMENSION IN DIASTOLE: 5.5 CM (ref 3.5–6)
LEFT VENTRICULAR MASS: 159.96 G
LYMPHOCYTES # BLD AUTO: 0.5 K/UL (ref 1–4.8)
LYMPHOCYTES NFR BLD: 4.9 % (ref 18–48)
MAGNESIUM SERPL-MCNC: 2 MG/DL (ref 1.6–2.6)
MCH RBC QN AUTO: 32.3 PG (ref 27–31)
MCHC RBC AUTO-ENTMCNC: 32.2 G/DL (ref 32–36)
MCV RBC AUTO: 100 FL (ref 82–98)
MONOCYTES # BLD AUTO: 0.2 K/UL (ref 0.3–1)
MONOCYTES NFR BLD: 1.6 % (ref 4–15)
NEUTROPHILS # BLD AUTO: 8.6 K/UL (ref 1.8–7.7)
NEUTROPHILS NFR BLD: 92.2 % (ref 38–73)
NRBC BLD-RTO: 0 /100 WBC
PCO2 BLDA: 45.9 MMHG (ref 35–45)
PH SMN: 7.33 [PH] (ref 7.35–7.45)
PHOSPHATE SERPL-MCNC: 3.5 MG/DL (ref 2.7–4.5)
PLATELET # BLD AUTO: 230 K/UL (ref 150–450)
PMV BLD AUTO: 10.1 FL (ref 9.2–12.9)
PO2 BLDA: 36 MMHG (ref 40–60)
POC BE: -2 MMOL/L
POC SATURATED O2: 65 % (ref 95–100)
POC TCO2: 26 MMOL/L (ref 24–29)
POTASSIUM SERPL-SCNC: 4.7 MMOL/L (ref 3.5–5.1)
RA MAJOR: 5.2 CM
RA PRESSURE ESTIMATED: 8 MMHG
RA WIDTH: 3.7 CM
RBC # BLD AUTO: 4.12 M/UL (ref 4–5.4)
RIGHT VENTRICULAR END-DIASTOLIC DIMENSION: 4.1 CM
SAMPLE: ABNORMAL
SAMPLE: ABNORMAL
SINUS: 2.9 CM
SITE: ABNORMAL
SITE: ABNORMAL
SODIUM SERPL-SCNC: 145 MMOL/L (ref 136–145)
TDI LATERAL: 0.15 M/S
TDI SEPTAL: 0.13 M/S
TDI: 0.14 M/S
WBC # BLD AUTO: 9.33 K/UL (ref 3.9–12.7)
Z-SCORE OF LEFT VENTRICULAR DIMENSION IN END DIASTOLE: -5.02
Z-SCORE OF LEFT VENTRICULAR DIMENSION IN END SYSTOLE: -2.48

## 2024-01-22 PROCEDURE — 36415 COLL VENOUS BLD VENIPUNCTURE: CPT | Performed by: NURSE PRACTITIONER

## 2024-01-22 PROCEDURE — 80048 BASIC METABOLIC PNL TOTAL CA: CPT | Performed by: NURSE PRACTITIONER

## 2024-01-22 PROCEDURE — 99900035 HC TECH TIME PER 15 MIN (STAT)

## 2024-01-22 PROCEDURE — 94640 AIRWAY INHALATION TREATMENT: CPT

## 2024-01-22 PROCEDURE — 63600175 PHARM REV CODE 636 W HCPCS: Performed by: NURSE PRACTITIONER

## 2024-01-22 PROCEDURE — 27000221 HC OXYGEN, UP TO 24 HOURS

## 2024-01-22 PROCEDURE — 21400001 HC TELEMETRY ROOM

## 2024-01-22 PROCEDURE — 25000003 PHARM REV CODE 250: Performed by: NURSE PRACTITIONER

## 2024-01-22 PROCEDURE — 63700000 PHARM REV CODE 250 ALT 637 W/O HCPCS: Performed by: NURSE PRACTITIONER

## 2024-01-22 PROCEDURE — 25000242 PHARM REV CODE 250 ALT 637 W/ HCPCS: Performed by: NURSE PRACTITIONER

## 2024-01-22 PROCEDURE — 84100 ASSAY OF PHOSPHORUS: CPT | Performed by: NURSE PRACTITIONER

## 2024-01-22 PROCEDURE — 94761 N-INVAS EAR/PLS OXIMETRY MLT: CPT

## 2024-01-22 PROCEDURE — 83605 ASSAY OF LACTIC ACID: CPT | Performed by: NURSE PRACTITIONER

## 2024-01-22 PROCEDURE — 85025 COMPLETE CBC W/AUTO DIFF WBC: CPT | Performed by: NURSE PRACTITIONER

## 2024-01-22 PROCEDURE — 25000003 PHARM REV CODE 250: Performed by: STUDENT IN AN ORGANIZED HEALTH CARE EDUCATION/TRAINING PROGRAM

## 2024-01-22 PROCEDURE — 83735 ASSAY OF MAGNESIUM: CPT | Performed by: NURSE PRACTITIONER

## 2024-01-22 RX ORDER — FOLIC ACID 1 MG/1
1 TABLET ORAL DAILY
Status: DISCONTINUED | OUTPATIENT
Start: 2024-01-22 | End: 2024-01-22 | Stop reason: HOSPADM

## 2024-01-22 RX ORDER — AMLODIPINE BESYLATE 10 MG/1
10 TABLET ORAL DAILY
Status: DISCONTINUED | OUTPATIENT
Start: 2024-01-22 | End: 2024-01-22 | Stop reason: HOSPADM

## 2024-01-22 RX ORDER — AMOXICILLIN 250 MG
1 CAPSULE ORAL 2 TIMES DAILY PRN
Status: DISCONTINUED | OUTPATIENT
Start: 2024-01-22 | End: 2024-01-22 | Stop reason: HOSPADM

## 2024-01-22 RX ORDER — HYDROCODONE BITARTRATE AND ACETAMINOPHEN 10; 325 MG/1; MG/1
1 TABLET ORAL EVERY 6 HOURS PRN
Status: DISCONTINUED | OUTPATIENT
Start: 2024-01-22 | End: 2024-01-22 | Stop reason: HOSPADM

## 2024-01-22 RX ORDER — THIAMINE HCL 100 MG
100 TABLET ORAL DAILY
Status: DISCONTINUED | OUTPATIENT
Start: 2024-01-22 | End: 2024-01-22 | Stop reason: HOSPADM

## 2024-01-22 RX ORDER — BENZONATATE 100 MG/1
100 CAPSULE ORAL 3 TIMES DAILY PRN
Status: DISCONTINUED | OUTPATIENT
Start: 2024-01-22 | End: 2024-01-22 | Stop reason: HOSPADM

## 2024-01-22 RX ORDER — SODIUM CHLORIDE 9 MG/ML
INJECTION, SOLUTION INTRAVENOUS CONTINUOUS
Status: ACTIVE | OUTPATIENT
Start: 2024-01-22 | End: 2024-01-22

## 2024-01-22 RX ORDER — METOPROLOL SUCCINATE 25 MG/1
25 TABLET, EXTENDED RELEASE ORAL DAILY
Status: DISCONTINUED | OUTPATIENT
Start: 2024-01-22 | End: 2024-01-22 | Stop reason: HOSPADM

## 2024-01-22 RX ORDER — GUAIFENESIN 600 MG/1
600 TABLET, EXTENDED RELEASE ORAL 2 TIMES DAILY
Status: DISCONTINUED | OUTPATIENT
Start: 2024-01-22 | End: 2024-01-22 | Stop reason: HOSPADM

## 2024-01-22 RX ORDER — ALPRAZOLAM 1 MG/1
1 TABLET ORAL 2 TIMES DAILY PRN
Status: DISCONTINUED | OUTPATIENT
Start: 2024-01-22 | End: 2024-01-22 | Stop reason: HOSPADM

## 2024-01-22 RX ORDER — SERTRALINE HYDROCHLORIDE 100 MG/1
100 TABLET, FILM COATED ORAL DAILY
Status: DISCONTINUED | OUTPATIENT
Start: 2024-01-22 | End: 2024-01-22 | Stop reason: HOSPADM

## 2024-01-22 RX ADMIN — Medication 100 MG: at 08:01

## 2024-01-22 RX ADMIN — THERA TABS 1 TABLET: TAB at 08:01

## 2024-01-22 RX ADMIN — AZITHROMYCIN DIHYDRATE 500 MG: 250 TABLET ORAL at 08:01

## 2024-01-22 RX ADMIN — PREDNISONE 60 MG: 50 TABLET ORAL at 08:01

## 2024-01-22 RX ADMIN — PANTOPRAZOLE SODIUM 40 MG: 40 TABLET, DELAYED RELEASE ORAL at 08:01

## 2024-01-22 RX ADMIN — AMLODIPINE BESYLATE 10 MG: 10 TABLET ORAL at 08:01

## 2024-01-22 RX ADMIN — IPRATROPIUM BROMIDE AND ALBUTEROL SULFATE 3 ML: .5; 3 SOLUTION RESPIRATORY (INHALATION) at 08:01

## 2024-01-22 RX ADMIN — SODIUM CHLORIDE: 9 INJECTION, SOLUTION INTRAVENOUS at 03:01

## 2024-01-22 RX ADMIN — SERTRALINE 100 MG: 100 TABLET, FILM COATED ORAL at 09:01

## 2024-01-22 RX ADMIN — FLUTICASONE FUROATE AND VILANTEROL TRIFENATATE 1 PUFF: 100; 25 POWDER RESPIRATORY (INHALATION) at 08:01

## 2024-01-22 RX ADMIN — BENZONATATE 100 MG: 100 CAPSULE ORAL at 02:01

## 2024-01-22 RX ADMIN — METOPROLOL SUCCINATE 25 MG: 25 TABLET, EXTENDED RELEASE ORAL at 08:01

## 2024-01-22 RX ADMIN — IPRATROPIUM BROMIDE AND ALBUTEROL SULFATE 3 ML: .5; 3 SOLUTION RESPIRATORY (INHALATION) at 01:01

## 2024-01-22 RX ADMIN — TIOTROPIUM BROMIDE INHALATION SPRAY 2 PUFF: 3.12 SPRAY, METERED RESPIRATORY (INHALATION) at 09:01

## 2024-01-22 RX ADMIN — GUAIFENESIN 600 MG: 600 TABLET, EXTENDED RELEASE ORAL at 08:01

## 2024-01-22 RX ADMIN — FOLIC ACID 1 MG: 1 TABLET ORAL at 08:01

## 2024-01-22 RX ADMIN — ENOXAPARIN SODIUM 60 MG: 60 INJECTION SUBCUTANEOUS at 08:01

## 2024-01-22 NOTE — ASSESSMENT & PLAN NOTE
" Patient is chronically on statin.will continue for now. Monitor clinically. Last LDL was No results found for: "LDLCALC"   "

## 2024-01-22 NOTE — NURSING
1431: Pt requesting to be discharged. States that she will leave AMA. PIV x 1 removed. Pt encouraged to stay to speak with provider. Provider (Jaziel) informed.     1445: RN brought AMA form back to patients room and she is no longer present to sign. MD aware.

## 2024-01-22 NOTE — ASSESSMENT & PLAN NOTE
-Chronic, uncontrolled.  -Continue zoloft.  - reviewed, continue PRN xanax.  -Adamantly against seeing psychiatrist or psychologist.

## 2024-01-22 NOTE — ED NOTES
Telemetry Verification   Patient placed on Telemetry Box  Verified with War Room  Box # [id number]   Monitor Tech    Rate 80   Rhythm NSR

## 2024-01-22 NOTE — ASSESSMENT & PLAN NOTE
Likely multiple factors contributed including acute hypoxemic respiratory failure, dehydration, and polypharmacy.  We discussed she can not mix alcohol, benzos, opiates, and over the counter medicines. She reports drinking and taking nyquil earlier this evening and does not remember the majority of the night including passing out in the bath. Reports she did not take xanax or percocet today.  -WBC 12.6 (recent steroid use). Cr 1.2.   -, Troponin <0.006.  -Lactate 3.4, repeat after IVF.  -Serum alcohol 229. UDS +benzos and opiates  -UA negative.  -EKG negative for acute ischemic changes.  -CXR negative for acute findings.  -Cardiac monitoring.  -Check Orthostatics.   -2D Echocardiogram.   -Fall precautions.

## 2024-01-22 NOTE — ED NOTES
Haydee Arreola, a 53 y.o. female presents to the ED w/ complaint of SOB. Pt states she still feels SOB.    Adult Physical Assessment  LOC: Haydee Arreola, 53 y.o. female verified via two identifiers.  The patient is awake, alert, oriented and speaking appropriately at this time.  APPEARANCE: Patient resting comfortably and appears to be in no acute distress at this time. Patient is clean and well groomed, patient's clothing is properly fastened.  SKIN:The skin is warm and dry, color consistent with ethnicity, patient has normal skin turgor and moist mucus membranes, skin intact, no breakdown or brusing noted.  MUSCULOSKELETAL: Patient moving all extremities well, no obvious swelling or deformities noted. Generalized weakness noted.  RESPIRATORY: Airway is open and patent, respirations are spontaneous. Pt states she feel SOB. Pt is wheezing. Pt is on the nasal cannula with humidification at this time.  CARDIAC: Patient has a normal rate and rhythm, no periphreal edema noted in any extremity, capillary refill < 3 seconds in all extremities  ABDOMEN: Soft and non tender to palpation, no abdominal distention noted. Bowel sounds present in all four quadrants.  NEUROLOGIC: Eyes open spontaneously, behavior appropriate to situation, follows commands, facial expression symmetrical, bilateral hand grasp equal and even, purposeful motor response noted, normal sensation in all extremities when touched with a finger.      Triage note:  Chief Complaint   Patient presents with    Shortness of Breath     Hx COPD, O2 sat 100% on 15 L, no room air O2 sat obtained.  per family Pt went unresponsive/increased SOB while in bath. Pt responsive at this time. +etoh. Received Duoneb with EMS.      Review of patient's allergies indicates:   Allergen Reactions    Tramadol Swelling and Anaphylaxis     Throat swelling  Throat swelling    Codeine Other (See Comments)     Unknown reaction  Unknown reaction    Ketorolac Nausea Only      Past Medical History:   Diagnosis Date    Abdominal pain 6/22/2022    Anemia     Anesthesia     PT HAS BULGING DISC/ HERNIATED  DISC C2-C7    Anxiety     Arthritis     RA    Asthma     Back problem     Blood transfusion     Cervical pain     COPD (chronic obstructive pulmonary disease)     CTS (carpal tunnel syndrome)     Edema 6/22/2022    Fatty liver 5/31/2021    GERD (gastroesophageal reflux disease)     Joint pain     shoulder    Joint pain     knee    Liver lesion 5/2/2021    Lumbar pain     Migraine     Neck pain     Panic attacks     Rotator cuff tear     RIGHT    Sciatica     Thyroid disease

## 2024-01-22 NOTE — ASSESSMENT & PLAN NOTE
Patient's COPD is with exacerbation noted by continued dyspnea currently.  Patient is currently on COPD Pathway. Continue scheduled inhalers Steroids, Antibiotics, and Supplemental oxygen and monitor respiratory status closely.  Patient reports increased shortness of breath and productive cough x1 month which progressively worsened and was seen by her doctor about 1 weeks ago. She was started on levaquin and prednisone and only has one dose left. She denies any improvement in her symptoms. Denies fever or chills.  - Afebrile, WBC 12.6 (on steroids)  - CXR demonstrated no acute intrathoracic process. Subsegmental atelectasis in the right midlung zone.  - procalcitonin 0.02, lactate 3.4  - Venous Blood Gas result:  pO2 36; pCO2 45.9; pH 7.33;  HCO3 24.2, %O2 Sat 90%.  - Prednisone 60 mg daily x 5 days  - rocephin and azithromycin  - scheduled DuoNebs ordered, mucinex or airway clearance techniques if needed   - No formal PFTs seen in the chart.  - Will need follow up with pulmonology.

## 2024-01-22 NOTE — SUBJECTIVE & OBJECTIVE
Past Medical History:   Diagnosis Date    Abdominal pain 2022    Anemia     Anesthesia     PT HAS BULGING DISC/ HERNIATED  DISC C2-C7    Anxiety     Arthritis     RA    Asthma     Back problem     Blood transfusion     Cervical pain     COPD (chronic obstructive pulmonary disease)     CTS (carpal tunnel syndrome)     Edema 2022    Fatty liver 2021    GERD (gastroesophageal reflux disease)     Joint pain     shoulder    Joint pain     knee    Liver lesion 2021    Lumbar pain     Migraine     Neck pain     Panic attacks     Rotator cuff tear     RIGHT    Sciatica     Thyroid disease        Past Surgical History:   Procedure Laterality Date    APPENDECTOMY       SECTION, LOW TRANSVERSE      x 2    COLONOSCOPY Left 2021    Procedure: COLONOSCOPY;  Surgeon: Akila Li MD;  Location: [facility];  Service: Endoscopy;  Laterality: Left;  BMI 51.67  covid test [place]   pt requested [facility]  cardiology clearance scanned into media tab dated -MS    HYSTERECTOMY      left ankle surg      plates and screws    OOPHORECTOMY      TONSILLECTOMY         Review of patient's allergies indicates:   Allergen Reactions    Tramadol Swelling and Anaphylaxis     Throat swelling  Throat swelling    Codeine Other (See Comments)     Unknown reaction  Unknown reaction    Ketorolac Nausea Only       Current Facility-Administered Medications on File Prior to Encounter   Medication    estradiol valerate injection 20 mg     Current Outpatient Medications on File Prior to Encounter   Medication Sig    acetaminophen (TYLENOL) 500 MG tablet Take 2 tablets (1,000 mg total) by mouth every 6 (six) hours as needed for Pain. (Patient not taking: Reported on 2023)    ADVAIR DISKUS 250-50 mcg/dose diskus inhaler Inhale 1 puff into the lungs 2 (two) times daily.    albuterol (ACCUNEB) 0.63 mg/3 mL Nebu Take 0.63 mg by nebulization every 6 (six) hours as needed.    albuterol-ipratropium (DUO-NEB) 2.5 mg-0.5  mg/3 mL nebulizer solution Inhale 3 mLs into the lungs.    albuterol-ipratropium (DUO-NEB) 2.5 mg-0.5 mg/3 mL nebulizer solution SMARTSIG:3 Milliliter(s) Via Nebulizer Every 6 Hours    ALPRAZolam (XANAX) 1 MG tablet Take 1 mg by mouth 2 (two) times daily.    alprazolam (XANAX) 2 MG Tab Take 2 mg by mouth 2 (two) times daily.    amLODIPine (NORVASC) 10 MG tablet Take 10 mg by mouth once daily.    benzonatate (TESSALON) 200 MG capsule TAKE 1 CAPSULE BY MOUTH THREE TIMES A DAY AS NEEDED FOR COUGH    cholecalciferol, vitamin D3, 1,250 mcg (50,000 unit) capsule Take 1 capsule by mouth every 7 days.    clotrimazole-betamethasone 1-0.05% (LOTRISONE) cream APPLY TO AFFECTED AREA TWICE DAILY    cyclobenzaprine (FLEXERIL) 10 MG tablet     docusate sodium (COLACE) 100 MG capsule Take 1 capsule (100 mg total) by mouth 2 (two) times daily as needed for Constipation. (Patient not taking: Reported on 2022)    ENTRESTO 24-26 mg per tablet Take 1 tablet by mouth 2 (two) times daily.    famotidine (PEPCID) 20 MG tablet Take 1 tablet (20 mg total) by mouth 2 (two) times daily. (Patient not taking: Reported on 2023)    fluconazole (DIFLUCAN) 150 MG Tab Take 1 tablet (150 mg total) by mouth as needed (vaginal yeast infection). Take one pill weekly as needed for yeast infection.    furosemide (LASIX) 40 MG tablet Take 60 mg by mouth.    hydroCHLOROthiazide (HYDRODIURIL) 12.5 MG Tab Take 12.5 mg by mouth once daily.    hydroCHLOROthiazide (HYDRODIURIL) 25 MG tablet Take 25 mg by mouth.    hydroCHLOROthiazide (HYDRODIURIL) 25 MG tablet Take 25 mg by mouth once daily.    HYDROcodone-acetaminophen (NORCO)  mg per tablet Take 1 tablet by mouth.    ibuprofen (ADVIL,MOTRIN) 600 MG tablet Take 1 tablet (600 mg total) by mouth every 6 (six) hours.    LIDOcaine (LIDODERM) 5 % SMARTSI Patch(s) Topical    loratadine 10 mg Cap loratadine Take No date recorded No form recorded No frequency recorded No route recorded No set duration  recorded No set duration amount recorded active No dosage strength recorded No dosage strength units of measure recorded    meloxicam (MOBIC) 15 MG tablet Take 15 mg by mouth daily as needed.    metoprolol succinate (TOPROL-XL) 25 MG 24 hr tablet Take 25 mg by mouth once daily.    montelukast (SINGULAIR) 10 mg tablet montelukast Take No date recorded No form recorded No frequency recorded No route recorded No set duration recorded No set duration amount recorded active No dosage strength recorded No dosage strength units of measure recorded    neomycin-polymyxin-hydrocortisone (CORTISPORIN) otic solution INT 3 GTS IN AU QID FOR 10 DAYS    nitroGLYCERIN (NITROSTAT) 0.4 MG SL tablet DISSOLVE 1 TABLET UNDER THE TONGUE EVERY 5 MINS AS NEEDED FOR CHEST PAIN FOR UP TO 3 DOSES. CALL 911 IF NO RELIEF    nystatin (MYCOSTATIN) 100,000 unit/mL suspension nystatin Take No date recorded No form recorded No frequency recorded No route recorded No set duration recorded No set duration amount recorded active No dosage strength recorded No dosage strength units of measure recorded    nystatin-triamcinolone (MYCOLOG II) cream Apply to affected area 2 times daily (Patient not taking: No sig reported)    omeprazole (PRILOSEC) 40 MG capsule Take 40 mg by mouth once daily.    potassium chloride (KLOR-CON) 10 MEQ TbSR Take 10 mEq by mouth once daily.    potassium chloride (MICRO-K) 10 MEQ CpSR potassium chloride Take No date recorded No form recorded No frequency recorded No route recorded No set duration recorded No set duration amount recorded active No dosage strength recorded No dosage strength units of measure recorded    potassium chloride (MICRO-K) 10 MEQ CpSR Take 10 mEq by mouth.    promethazine (PHENERGAN) 25 MG suppository Place 1 suppository (25 mg total) rectally every 6 (six) hours as needed (Use if  nausea not controlled with oral medication). (Patient not taking: Reported on 11/16/2023)    rosuvastatin (CRESTOR) 10 MG  tablet Take 10 mg by mouth once daily.    sertraline (ZOLOFT) 100 MG tablet Take 100 mg by mouth once daily.    sertraline (ZOLOFT) 25 MG tablet Take 100 mg by mouth once daily.     spironolactone (ALDACTONE) 25 MG tablet spironolactone Take No date recorded No form recorded No frequency recorded No route recorded No set duration recorded No set duration amount recorded active No dosage strength recorded No dosage strength units of measure recorded    terconazole (TERAZOL 3) 0.8 % vaginal cream INSERT 1 APPLICATORFUL VAGINALLY AT BEDTIME (Patient not taking: Reported on 11/16/2023)    tiotropium (SPIRIVA) 18 mcg inhalation capsule Inhale 18 mcg into the lungs once daily. Controller    VENTOLIN HFA 90 mcg/actuation inhaler 2 puffs every 4 to 6 hours as needed.     Family History       Problem Relation (Age of Onset)    Breast cancer Maternal Aunt, Paternal Grandmother    Diabetes Sister          Tobacco Use    Smoking status: Former     Current packs/day: 1.00     Average packs/day: 1 pack/day for 8.0 years (8.0 ttl pk-yrs)     Types: Cigarettes    Smokeless tobacco: Former   Substance and Sexual Activity    Alcohol use: No    Drug use: No    Sexual activity: Yes     Partners: Male     Review of Systems   Constitutional:  Positive for fatigue. Negative for appetite change, chills, diaphoresis and fever.   HENT:  Negative for congestion, rhinorrhea and sore throat.    Eyes:  Negative for photophobia and visual disturbance.   Respiratory:  Positive for cough, chest tightness, shortness of breath and wheezing.    Cardiovascular:  Negative for chest pain, palpitations and leg swelling.   Gastrointestinal:  Positive for abdominal pain (chronic, intermittent). Negative for abdominal distention, diarrhea, nausea and vomiting.   Genitourinary:  Negative for dysuria, frequency and hematuria.   Musculoskeletal:  Positive for back pain and neck pain. Negative for gait problem.   Neurological:  Positive for syncope. Negative for  dizziness, weakness, light-headedness and headaches.   Psychiatric/Behavioral:  Positive for dysphoric mood. Negative for confusion and suicidal ideas. The patient is nervous/anxious.      Objective:     Vital Signs (Most Recent):  Temp: 98.4 °F (36.9 °C) (01/21/24 1939)  Pulse: 81 (01/21/24 2049)  Resp: 14 (01/21/24 2017)  BP: 111/71 (01/21/24 2049)  SpO2: 95 % (01/21/24 2049) Vital Signs (24h Range):  Temp:  [98.4 °F (36.9 °C)] 98.4 °F (36.9 °C)  Pulse:  [] 81  Resp:  [14-39] 14  SpO2:  [95 %-100 %] 95 %  BP: (108-114)/(57-78) 111/71     Weight: 135.2 kg (298 lb)  Body mass index is 54.5 kg/m².     Physical Exam  Vitals and nursing note reviewed.   Constitutional:       General: She is not in acute distress.     Appearance: She is obese. She is ill-appearing. She is not toxic-appearing or diaphoretic.      Interventions: Nasal cannula in place.   HENT:      Head: Normocephalic and atraumatic.      Nose: Nose normal.      Mouth/Throat:      Mouth: Mucous membranes are moist.   Eyes:      Pupils: Pupils are equal, round, and reactive to light.   Cardiovascular:      Rate and Rhythm: Normal rate and regular rhythm.      Pulses: Normal pulses.   Pulmonary:      Effort: Tachypnea present. No accessory muscle usage or respiratory distress.      Breath sounds: Decreased air movement present. Wheezing present. No rhonchi or rales.      Comments: Diffuse expiratory wheezes on bilaterally. Currently on 2L NC.  Abdominal:      General: Bowel sounds are normal. There is no distension.      Palpations: Abdomen is soft.      Tenderness: There is no abdominal tenderness. There is no guarding.   Musculoskeletal:         General: Normal range of motion.      Cervical back: Normal range of motion.      Right lower leg: No edema.      Left lower leg: No edema.   Skin:     General: Skin is warm and dry.      Capillary Refill: Capillary refill takes less than 2 seconds.   Neurological:      General: No focal deficit present.      " Mental Status: She is alert and oriented to person, place, and time.      Sensory: No sensory deficit.      Motor: No weakness.   Psychiatric:         Attention and Perception: Attention normal.         Mood and Affect: Mood normal. Affect is tearful (intermittently tearful when discussing her ).         Speech: Speech normal.         Behavior: Behavior normal. Behavior is cooperative.         Thought Content: Thought content does not include suicidal ideation.              CRANIAL NERVES     CN III, IV, VI   Pupils are equal, round, and reactive to light.       Significant Labs: All pertinent labs within the past 24 hours have been reviewed.  CBC:   Recent Labs   Lab 01/21/24 2000   WBC 12.60   HGB 14.0   HCT 42.4        CMP:   Recent Labs   Lab 01/21/24 2000      K 3.2*      CO2 22*   *   BUN 23*   CREATININE 1.2   CALCIUM 9.5   PROT 7.5   ALBUMIN 4.4   BILITOT 0.3   ALKPHOS 107   AST 13   ALT 19   ANIONGAP 15     Cardiac Markers:   Recent Labs   Lab 01/21/24 2000   *     Lactic Acid:   Recent Labs   Lab 01/21/24 2000   LACTATE 3.4*     Magnesium:   Recent Labs   Lab 01/21/24 2000   MG 2.2     Troponin:   Recent Labs   Lab 01/21/24 2000   TROPONINI <0.006     Urine Studies: No results for input(s): "COLORU", "APPEARANCEUA", "PHUR", "SPECGRAV", "PROTEINUA", "GLUCUA", "KETONESU", "BILIRUBINUA", "OCCULTUA", "NITRITE", "UROBILINOGEN", "LEUKOCYTESUR", "RBCUA", "WBCUA", "BACTERIA", "SQUAMEPITHEL", "HYALINECASTS" in the last 48 hours.    Invalid input(s): "WRIGHTSUR"    Significant Imaging: I have reviewed all pertinent imaging results/findings within the past 24 hours.  Imaging Results              X-Ray Chest AP Portable (Final result)  Result time 01/21/24 21:07:14      Final result by Kaushik Dubois MD (01/21/24 21:07:14)                   Impression:      No acute intrathoracic process.    Subsegmental atelectasis in the right midlung zone.      Electronically signed " by: Kaushik Dubois MD  Date:    01/21/2024  Time:    21:07               Narrative:    EXAMINATION:  XR CHEST AP PORTABLE    CLINICAL HISTORY:  Shortness of breath.    TECHNIQUE:  Single frontal view of the chest was performed.    COMPARISON:  03/08/2021.    FINDINGS:  Monitoring EKG leads are present.  The trachea is unremarkable.  The cardiomediastinal silhouette is within normal limits.  There is no evidence of free air beneath the hemidiaphragms.  There are no pleural effusions.  There is no evidence of a pneumothorax.  There is subsegmental atelectasis in the right midlung zone.  There is no focal consolidation.  There are degenerative changes in the osseous structures.

## 2024-01-22 NOTE — PLAN OF CARE
[facility]  Discharge Final Note    Primary Care Provider: Jaxson Oh MD    Expected Discharge Date: 1/22/2024    Final Discharge Note (most recent)       Final Note - 01/22/24 1515          Final Note    Assessment Type Final Discharge Note     Anticipated Discharge Disposition Home or Self Care     Hospital Resources/Appts/Education Provided Provided patient/caregiver with written discharge plan information        Post-Acute Status    Patient choice form signed by patient/caregiver List with quality metrics by geographic area provided     Discharge Delays None known at this time                     Important Message from Medicare             Pt d/c home with family. No d/c needs reported by medical team at this time.      Keren Mancini, BRANDO  [facility]  Ext. [phone]

## 2024-01-22 NOTE — ASSESSMENT & PLAN NOTE
Patient with Hypoxic Respiratory failure which is Acute.  she is not on home oxygen. Supplemental oxygen was provided and noted-      .   Signs/symptoms of respiratory failure include- tachypnea, respiratory distress, and wheezing. Contributing diagnoses includes - COPD Labs and images were reviewed. Patient Has recent ABG, which has been reviewed. Will treat underlying causes and adjust management of respiratory failure as follows-   -Continue scheduled inhalers, steroids, abx, and supplemental oxygen.  -Wean oxygen as tolerated, SpO2 goal 88-92%

## 2024-01-22 NOTE — ASSESSMENT & PLAN NOTE
Patient with acute kidney injury/acute renal failure likely due to pre-renal azotemia due to dehydration BRYCE is currently stable. Baseline creatinine  0.9  - Labs reviewed- Renal function/electrolytes with Estimated Creatinine Clearance: 70.3 mL/min (based on SCr of 1.2 mg/dL). according to latest data. Monitor urine output and serial BMP and adjust therapy as needed. Avoid nephrotoxins and renally dose meds for GFR listed above.  -Received IVF bolus in the ED, continue IVF overnight.  -Hold diuretics.

## 2024-01-22 NOTE — H&P
[facility] - Emergency Dept  [facility] Medicine  History & Physical    Patient Name: Haydee Arreola  MRN: [id number]  Patient Class: OP- Observation  Admission Date: 1/21/2024  Attending Physician: Keren Patel MD   Primary Care Provider: Jaxson Oh MD         Patient information was obtained from patient, past medical records, and ER records.     Subjective:     Principal Problem:Chronic obstructive pulmonary disease with acute exacerbation    Chief Complaint:   Chief Complaint   Patient presents with    Shortness of Breath     Hx COPD, O2 sat 100% on 15 L, no room air O2 sat obtained.  per family Pt went unresponsive/increased SOB while in bath. Pt responsive at this time. +etoh. Received Duoneb with EMS.         HPI: Haydee Arreola is a 53 y.o. female with a PMHx of COPD, asthma, nicotine dependence, anxiety, HLD, HTN, CHF (EF 55%), GERD, fatty liver, morbid obesity, thyroid disease, OA, and ETOH abuse who presents to the ED by EMS for worsening shortness of breath and a syncopal episode. The patient reports increased shortness of breath and productive cough x1 month which has progressively worsened. She was seen by her doctor about 1 weeks ago. She was started on levaquin and prednisone and only has one dose left. She denies any improvement in her symptoms. Endorses associated chest tightness and wheezing. Denies fever or chills. The patient also admits to alcohol intake today and expresses anxiety and grief related to the death of her  2 years ago. She reports she also took nyquil for the first time tonight and states she doesn't remember the events of the evening including agreeing to take a bath. Reportedly she passed out after her sister bathed her. The patient denies N/V/D, CP, headache, or dysuria. The patient endorses chronic, intermittent upper abdominal pain but denies significant pain currently. Also notes chronic back and neck pain that is at baseline.    In the ED, patient  tachycardic and tachypneic, afebrile. She was initially on 15L non rebreather but able to wean to 2L via nasal cannula. On room air SpO2 87-88%. WBC 12.6. K+ 3.2. Cr 1.2 (bl 0.9). Glucose 154. Lactate 3.4. TSH WNL. ETOH 229. . Troponin <0.006. EKG with SR, 70 bpm, no acute ischemic changes. VBG with pH 7.33, PCO2 45.9, PO2 36, and HCO3 24.2. UDS +benzos and opiates. CXR with no acute intrathoracic process. Subsegmental atelectasis in the right midlung zone. The patient received duo nebs, IV solumedrol, oral potassium replacement, and IVF bolus.    Past Medical History:   Diagnosis Date    Abdominal pain 2022    Anemia     Anesthesia     PT HAS BULGING DISC/ HERNIATED  DISC C2-C7    Anxiety     Arthritis     RA    Asthma     Back problem     Blood transfusion     Cervical pain     COPD (chronic obstructive pulmonary disease)     CTS (carpal tunnel syndrome)     Edema 2022    Fatty liver 2021    GERD (gastroesophageal reflux disease)     Joint pain     shoulder    Joint pain     knee    Liver lesion 2021    Lumbar pain     Migraine     Neck pain     Panic attacks     Rotator cuff tear     RIGHT    Sciatica     Thyroid disease        Past Surgical History:   Procedure Laterality Date    APPENDECTOMY       SECTION, LOW TRANSVERSE      x 2    COLONOSCOPY Left 2021    Procedure: COLONOSCOPY;  Surgeon: Akila Li MD;  Location: [facility];  Service: Endoscopy;  Laterality: Left;  BMI 51.67  covid test Joshua   [place] requested [facility] [place]  cardiology clearance scanned into media tab dated -MS    HYSTERECTOMY      left ankle surg      plates and screws    OOPHORECTOMY      TONSILLECTOMY         Review of patient's allergies indicates:   Allergen Reactions    Tramadol Swelling and Anaphylaxis     Throat swelling  Throat swelling    Codeine Other (See Comments)     Unknown reaction  Unknown reaction    Ketorolac Nausea Only       Current Facility-Administered Medications on File  Prior to Encounter   Medication    estradiol valerate injection 20 mg     Current Outpatient Medications on File Prior to Encounter   Medication Sig    acetaminophen (TYLENOL) 500 MG tablet Take 2 tablets (1,000 mg total) by mouth every 6 (six) hours as needed for Pain. (Patient not taking: Reported on 11/16/2023)    ADVAIR DISKUS 250-50 mcg/dose diskus inhaler Inhale 1 puff into the lungs 2 (two) times daily.    albuterol (ACCUNEB) 0.63 mg/3 mL Nebu Take 0.63 mg by nebulization every 6 (six) hours as needed.    albuterol-ipratropium (DUO-NEB) 2.5 mg-0.5 mg/3 mL nebulizer solution Inhale 3 mLs into the lungs.    albuterol-ipratropium (DUO-NEB) 2.5 mg-0.5 mg/3 mL nebulizer solution SMARTSIG:3 Milliliter(s) Via Nebulizer Every 6 Hours    ALPRAZolam (XANAX) 1 MG tablet Take 1 mg by mouth 2 (two) times daily.    alprazolam (XANAX) 2 MG Tab Take 2 mg by mouth 2 (two) times daily.    amLODIPine (NORVASC) 10 MG tablet Take 10 mg by mouth once daily.    benzonatate (TESSALON) 200 MG capsule TAKE 1 CAPSULE BY MOUTH THREE TIMES A DAY AS NEEDED FOR COUGH    cholecalciferol, vitamin D3, 1,250 mcg (50,000 unit) capsule Take 1 capsule by mouth every 7 days.    clotrimazole-betamethasone 1-0.05% (LOTRISONE) cream APPLY TO AFFECTED AREA TWICE DAILY    cyclobenzaprine (FLEXERIL) 10 MG tablet     docusate sodium (COLACE) 100 MG capsule Take 1 capsule (100 mg total) by mouth 2 (two) times daily as needed for Constipation. (Patient not taking: Reported on 6/22/2022)    ENTRESTO 24-26 mg per tablet Take 1 tablet by mouth 2 (two) times daily.    famotidine (PEPCID) 20 MG tablet Take 1 tablet (20 mg total) by mouth 2 (two) times daily. (Patient not taking: Reported on 11/16/2023)    fluconazole (DIFLUCAN) 150 MG Tab Take 1 tablet (150 mg total) by mouth as needed (vaginal yeast infection). Take one pill weekly as needed for yeast infection.    furosemide (LASIX) 40 MG tablet Take 60 mg by mouth.    hydroCHLOROthiazide (HYDRODIURIL) 12.5  MG Tab Take 12.5 mg by mouth once daily.    hydroCHLOROthiazide (HYDRODIURIL) 25 MG tablet Take 25 mg by mouth.    hydroCHLOROthiazide (HYDRODIURIL) 25 MG tablet Take 25 mg by mouth once daily.    HYDROcodone-acetaminophen (NORCO)  mg per tablet Take 1 tablet by mouth.    ibuprofen (ADVIL,MOTRIN) 600 MG tablet Take 1 tablet (600 mg total) by mouth every 6 (six) hours.    LIDOcaine (LIDODERM) 5 % SMARTSI Patch(s) Topical    loratadine 10 mg Cap loratadine Take No date recorded No form recorded No frequency recorded No route recorded No set duration recorded No set duration amount recorded active No dosage strength recorded No dosage strength units of measure recorded    meloxicam (MOBIC) 15 MG tablet Take 15 mg by mouth daily as needed.    metoprolol succinate (TOPROL-XL) 25 MG 24 hr tablet Take 25 mg by mouth once daily.    montelukast (SINGULAIR) 10 mg tablet montelukast Take No date recorded No form recorded No frequency recorded No route recorded No set duration recorded No set duration amount recorded active No dosage strength recorded No dosage strength units of measure recorded    neomycin-polymyxin-hydrocortisone (CORTISPORIN) otic solution INT 3 GTS IN AU QID FOR 10 DAYS    nitroGLYCERIN (NITROSTAT) 0.4 MG SL tablet DISSOLVE 1 TABLET UNDER THE TONGUE EVERY 5 MINS AS NEEDED FOR CHEST PAIN FOR UP TO 3 DOSES. CALL 911 IF NO RELIEF    nystatin (MYCOSTATIN) 100,000 unit/mL suspension nystatin Take No date recorded No form recorded No frequency recorded No route recorded No set duration recorded No set duration amount recorded active No dosage strength recorded No dosage strength units of measure recorded    nystatin-triamcinolone (MYCOLOG II) cream Apply to affected area 2 times daily (Patient not taking: No sig reported)    omeprazole (PRILOSEC) 40 MG capsule Take 40 mg by mouth once daily.    potassium chloride (KLOR-CON) 10 MEQ TbSR Take 10 mEq by mouth once daily.    potassium chloride (MICRO-K) 10  MEQ CpSR potassium chloride Take No date recorded No form recorded No frequency recorded No route recorded No set duration recorded No set duration amount recorded active No dosage strength recorded No dosage strength units of measure recorded    potassium chloride (MICRO-K) 10 MEQ CpSR Take 10 mEq by mouth.    promethazine (PHENERGAN) 25 MG suppository Place 1 suppository (25 mg total) rectally every 6 (six) hours as needed (Use if  nausea not controlled with oral medication). (Patient not taking: Reported on 11/16/2023)    rosuvastatin (CRESTOR) 10 MG tablet Take 10 mg by mouth once daily.    sertraline (ZOLOFT) 100 MG tablet Take 100 mg by mouth once daily.    sertraline (ZOLOFT) 25 MG tablet Take 100 mg by mouth once daily.     spironolactone (ALDACTONE) 25 MG tablet spironolactone Take No date recorded No form recorded No frequency recorded No route recorded No set duration recorded No set duration amount recorded active No dosage strength recorded No dosage strength units of measure recorded    terconazole (TERAZOL 3) 0.8 % vaginal cream INSERT 1 APPLICATORFUL VAGINALLY AT BEDTIME (Patient not taking: Reported on 11/16/2023)    tiotropium (SPIRIVA) 18 mcg inhalation capsule Inhale 18 mcg into the lungs once daily. Controller    VENTOLIN HFA 90 mcg/actuation inhaler 2 puffs every 4 to 6 hours as needed.     Family History       Problem Relation (Age of Onset)    Breast cancer Maternal Aunt, Paternal Grandmother    Diabetes Sister          Tobacco Use    Smoking status: Former     Current packs/day: 1.00     Average packs/day: 1 pack/day for 8.0 years (8.0 ttl pk-yrs)     Types: Cigarettes    Smokeless tobacco: Former   Substance and Sexual Activity    Alcohol use: No    Drug use: No    Sexual activity: Yes     Partners: Male     Review of Systems   Constitutional:  Positive for fatigue. Negative for appetite change, chills, diaphoresis and fever.   HENT:  Negative for congestion, rhinorrhea and sore throat.     Eyes:  Negative for photophobia and visual disturbance.   Respiratory:  Positive for cough, chest tightness, shortness of breath and wheezing.    Cardiovascular:  Negative for chest pain, palpitations and leg swelling.   Gastrointestinal:  Positive for abdominal pain (chronic, intermittent). Negative for abdominal distention, diarrhea, nausea and vomiting.   Genitourinary:  Negative for dysuria, frequency and hematuria.   Musculoskeletal:  Positive for back pain and neck pain. Negative for gait problem.   Neurological:  Positive for syncope. Negative for dizziness, weakness, light-headedness and headaches.   Psychiatric/Behavioral:  Positive for dysphoric mood. Negative for confusion and suicidal ideas. The patient is nervous/anxious.      Objective:     Vital Signs (Most Recent):  Temp: 98.4 °F (36.9 °C) (01/21/24 1939)  Pulse: 81 (01/21/24 2049)  Resp: 14 (01/21/24 2017)  BP: 111/71 (01/21/24 2049)  SpO2: 95 % (01/21/24 2049) Vital Signs (24h Range):  Temp:  [98.4 °F (36.9 °C)] 98.4 °F (36.9 °C)  Pulse:  [] 81  Resp:  [14-39] 14  SpO2:  [95 %-100 %] 95 %  BP: (108-114)/(57-78) 111/71     Weight: 135.2 kg (298 lb)  Body mass index is 54.5 kg/m².     Physical Exam  Vitals and nursing note reviewed.   Constitutional:       General: She is not in acute distress.     Appearance: She is obese. She is ill-appearing. She is not toxic-appearing or diaphoretic.      Interventions: Nasal cannula in place.   HENT:      Head: Normocephalic and atraumatic.      Nose: Nose normal.      Mouth/Throat:      Mouth: Mucous membranes are moist.   Eyes:      Pupils: Pupils are equal, round, and reactive to light.   Cardiovascular:      Rate and Rhythm: Normal rate and regular rhythm.      Pulses: Normal pulses.   Pulmonary:      Effort: Tachypnea present. No accessory muscle usage or respiratory distress.      Breath sounds: Decreased air movement present. Wheezing present. No rhonchi or rales.      Comments: Diffuse expiratory  "wheezes on bilaterally. Currently on 2L NC.  Abdominal:      General: Bowel sounds are normal. There is no distension.      Palpations: Abdomen is soft.      Tenderness: There is no abdominal tenderness. There is no guarding.   Musculoskeletal:         General: Normal range of motion.      Cervical back: Normal range of motion.      Right lower leg: No edema.      Left lower leg: No edema.   Skin:     General: Skin is warm and dry.      Capillary Refill: Capillary refill takes less than 2 seconds.   Neurological:      General: No focal deficit present.      Mental Status: She is alert and oriented to person, place, and time.      Sensory: No sensory deficit.      Motor: No weakness.   Psychiatric:         Attention and Perception: Attention normal.         Mood and Affect: Mood normal. Affect is tearful (intermittently tearful when discussing her ).         Speech: Speech normal.         Behavior: Behavior normal. Behavior is cooperative.         Thought Content: Thought content does not include suicidal ideation.              CRANIAL NERVES     CN III, IV, VI   Pupils are equal, round, and reactive to light.       Significant Labs: All pertinent labs within the past 24 hours have been reviewed.  CBC:   Recent Labs   Lab 01/21/24 2000   WBC 12.60   HGB 14.0   HCT 42.4        CMP:   Recent Labs   Lab 01/21/24 2000      K 3.2*      CO2 22*   *   BUN 23*   CREATININE 1.2   CALCIUM 9.5   PROT 7.5   ALBUMIN 4.4   BILITOT 0.3   ALKPHOS 107   AST 13   ALT 19   ANIONGAP 15     Cardiac Markers:   Recent Labs   Lab 01/21/24 2000   *     Lactic Acid:   Recent Labs   Lab 01/21/24 2000   LACTATE 3.4*     Magnesium:   Recent Labs   Lab 01/21/24 2000   MG 2.2     Troponin:   Recent Labs   Lab 01/21/24 2000   TROPONINI <0.006     Urine Studies: No results for input(s): "COLORU", "APPEARANCEUA", "PHUR", "SPECGRAV", "PROTEINUA", "GLUCUA", "KETONESU", "BILIRUBINUA", "OCCULTUA", "NITRITE", " ""UROBILINOGEN", "LEUKOCYTESUR", "RBCUA", "WBCUA", "BACTERIA", "SQUAMEPITHEL", "HYALINECASTS" in the last 48 hours.    Invalid input(s): "WRIGHTSUR"    Significant Imaging: I have reviewed all pertinent imaging results/findings within the past 24 hours.  Imaging Results              X-Ray Chest AP Portable (Final result)  Result time 01/21/24 21:07:14      Final result by Kaushik Dubois MD (01/21/24 21:07:14)                   Impression:      No acute intrathoracic process.    Subsegmental atelectasis in the right midlung zone.      Electronically signed by: Kaushik Dubois MD  Date:    01/21/2024  Time:    21:07               Narrative:    EXAMINATION:  XR CHEST AP PORTABLE    CLINICAL HISTORY:  Shortness of breath.    TECHNIQUE:  Single frontal view of the chest was performed.    COMPARISON:  03/08/2021.    FINDINGS:  Monitoring EKG leads are present.  The trachea is unremarkable.  The cardiomediastinal silhouette is within normal limits.  There is no evidence of free air beneath the hemidiaphragms.  There are no pleural effusions.  There is no evidence of a pneumothorax.  There is subsegmental atelectasis in the right midlung zone.  There is no focal consolidation.  There are degenerative changes in the osseous structures.                                      Assessment/Plan:     * Chronic obstructive pulmonary disease with acute exacerbation  Patient's COPD is with exacerbation noted by continued dyspnea currently.  Patient is currently on COPD Pathway. Continue scheduled inhalers Steroids, Antibiotics, and Supplemental oxygen and monitor respiratory status closely.  Patient reports increased shortness of breath and productive cough x1 month which progressively worsened and was seen by her doctor about 1 weeks ago. She was started on levaquin and prednisone and only has one dose left. She denies any improvement in her symptoms. Denies fever or chills.  - Afebrile, WBC 12.6 (on steroids)  - CXR demonstrated no acute " intrathoracic process. Subsegmental atelectasis in the right midlung zone.  - procalcitonin 0.02, lactate 3.4  - Venous Blood Gas result:  pO2 36; pCO2 45.9; pH 7.33;  HCO3 24.2, %O2 Sat 90%.  - started Prednisone 60 mg daily x 5 days  - started rocephin and azithromycin  - scheduled DuoNebs ordered, mucinex or airway clearance techniques if needed   - No formal PFTs seen in the chart.  - Will need follow up with pulmonology.    Acute hypoxemic respiratory failure  Patient with Hypoxic Respiratory failure which is Acute.  she is not on home oxygen. Supplemental oxygen was provided and noted-      .   Signs/symptoms of respiratory failure include- tachypnea, respiratory distress, and wheezing. Contributing diagnoses includes - COPD Labs and images were reviewed. Patient Has recent ABG, which has been reviewed. Will treat underlying causes and adjust management of respiratory failure as follows-   -Continue scheduled inhalers, steroids, abx, and supplemental oxygen.  -Wean oxygen as tolerated, SpO2 goal 88-92%    BRYCE (acute kidney injury)  Patient with acute kidney injury/acute renal failure likely due to pre-renal azotemia due to dehydration BRYCE is currently stable. Baseline creatinine  0.9  - Labs reviewed- Renal function/electrolytes with Estimated Creatinine Clearance: 70.3 mL/min (based on SCr of 1.2 mg/dL). according to latest data. Monitor urine output and serial BMP and adjust therapy as needed. Avoid nephrotoxins and renally dose meds for GFR listed above.  -Received IVF bolus in the ED, continue IVF overnight.  -Hold diuretics.    Syncope  Likely multiple factors contributed including acute hypoxemic respiratory failure, dehydration, and polypharmacy.  We discussed she can not mix alcohol, benzos, opiates, and over the counter medicines. She reports drinking and taking nyquil earlier this evening and does not remember the majority of the night including passing out in the bath. Reports she did not take xanax  "or percocet today.  -WBC 12.6 (recent steroid use). Cr 1.2.   -, Troponin <0.006.  -Lactate 3.4, repeat after IVF.  -Serum alcohol 229. UDS +benzos and opiates  -UA negative.  -EKG negative for acute ischemic changes.  -CXR negative for acute findings.  -Cardiac monitoring.  -Check Orthostatics.   -2D Echocardiogram.   -Fall precautions.     Hypokalemia  Patient has hypokalemia which is Acute and currently uncontrolled. Most recent potassium levels reviewed-   Lab Results   Component Value Date    K 3.2 (L) 01/21/2024   . Will continue potassium replacement per protocol and recheck repeat levels after replacement completed.     HLD (hyperlipidemia)   Patient is chronically on statin.will continue for now. Monitor clinically. Last LDL was No results found for: "LDLCALC"     HTN (hypertension)  Chronic, controlled. Latest blood pressure and vitals reviewed-     Temp:  [98 °F (36.7 °C)-98.7 °F (37.1 °C)]   Pulse:  []   Resp:  [14-39]   BP: (105-172)/(55-81)   SpO2:  [88 %-100 %] .   Home meds for hypertension were reviewed and noted below.   Hypertension Medications               amLODIPine (NORVASC) 10 MG tablet Take 10 mg by mouth once daily.    ENTRESTO 24-26 mg per tablet Take 1 tablet by mouth 2 (two) times daily.    furosemide (LASIX) 40 MG tablet Take 60 mg by mouth.    metoprolol succinate (TOPROL-XL) 25 MG 24 hr tablet Take 25 mg by mouth once daily.    nitroGLYCERIN (NITROSTAT) 0.4 MG SL tablet DISSOLVE 1 TABLET UNDER THE TONGUE EVERY 5 MINS AS NEEDED FOR CHEST PAIN FOR UP TO 3 DOSES. CALL 911 IF NO RELIEF    spironolactone (ALDACTONE) 25 MG tablet spironolactone Take No date recorded No form recorded No frequency recorded No route recorded No set duration recorded No set duration amount recorded active No dosage strength recorded No dosage strength units of measure recorded            While in the hospital, will manage blood pressure as follows; Adjust home antihypertensive regimen as follows- " Hold entresto, lasix, and aldactone in the setting of BRYCE.    Will utilize p.r.n. blood pressure medication only if patient's blood pressure greater than 180/110 and she develops symptoms such as worsening chest pain or shortness of breath.    Osteoarthritis  -Chronic issue.  - reviewed, continue PRN norco.    Chronic diastolic congestive heart failure  Patient is identified as having Diastolic (HFpEF) heart failure that is Chronic. CHF is currently controlled. Latest ECHO performed and demonstrates- Results for orders placed during the hospital encounter of 07/05/22    Echo    Interpretation Summary  · The left ventricle is mildly enlarged with concentric hypertrophy and normal systolic function.  · The estimated ejection fraction is 55%.  · Normal left ventricular diastolic function.  · Normal right ventricular size with normal right ventricular systolic function.  · Mild left atrial enlargement.  · Mild mitral regurgitation.  · Normal central venous pressure (3 mmHg).  · The estimated PA systolic pressure is 34 mmHg.  . Continue Beta Blocker and monitor clinical status closely. Monitor on telemetry. Patient is off CHF pathway.  Monitor strict Is&Os and daily weights. Cardiology has not been consulted. Continue to stress to patient importance of self efficacy and  on diet for CHF. Last BNP reviewed- and noted below   Recent Labs   Lab 01/21/24 2000   *   Patient appears hypovolemic on exam. Hold diuretics. Also has an BRYCE, will hold entresto as well.    Cigarette nicotine dependence without complication  Dangers of cigarette smoking were reviewed with patient in detail. Patient was Counseled for 3-10 minutes. Nicotine replacement options were discussed. Nicotine replacement was discussed- not prescribed per patient's request    ETOH abuse  -Patient reports drinking Gomez Huang daily was previously drinking 2 pints per day but has cut back to one, last drink on 1/21. Patient reports hand  tremors when she goes without drinking but denies previous seizures. Discussed dangers of ETOH withdrawal.  -ETOH level 229.  -I recommended a taper of valium, ativan, or librium which she refused. She states she only wants her home xanax PRN. I discussed the importance of being medically managed if she plans to stop drinking.  -Nursing to perform CIWA assessment. If CIWA rises will attempt to readdress a benzo taper.  -Initiate folic acid, thiamine, and MVI.    -Discussed the dangers of continued ETOH use with Pt and apparent systemic effects of her abuse.    -We discussed different resources as well as addiction psych. Patient is adamant that she will not see a psychiatrist ever.     Morbid obesity with BMI of 50.0-59.9, adult  Body mass index is 54.5 kg/m². Morbid obesity complicates all aspects of disease management from diagnostic modalities to treatment. Weight loss encouraged and health benefits explained to patient.    GERD without esophagitis  -Chronic, controlled.  -Continue PPI.    Asthma  -Continue daily inhalers and singulair.    Anxiety  -Chronic, uncontrolled.  -Continue zoloft.  - reviewed, continue PRN xanax.  -Adamantly against seeing psychiatrist or psychologist.    Fatty liver  -Chronic issue.  -LFTs WNL.  -Follows with hepatology outpatient.      VTE Risk Mitigation (From admission, onward)           Ordered     enoxaparin injection 40 mg  Every 12 hours         01/21/24 2200     IP VTE HIGH RISK PATIENT  Once         01/21/24 2200     Place sequential compression device  Until discontinued         01/21/24 2200                         On 01/21/2024, patient should be placed in hospital observation services under my care in collaboration with Alexis Samano MD.           Vaishali Dee NP  Department of Hospital Medicine  [facility] Dept

## 2024-01-22 NOTE — NURSING
Nurses Note -- 4 Eyes      1/22/2024   3:07 AM      Skin assessed during: Admit      [x] No Altered Skin Integrity Present    []Prevention Measures Documented      [] Yes- Altered Skin Integrity Present or Discovered   [] LDA Added if Not in Epic (Describe Wound)   [] New Altered Skin Integrity was Present on Admit and Documented in LDA   [] Wound Image Taken    Wound Care Consulted? No    Attending Nurse:  Aislinn Lr RN/Staff Member:   Osiris

## 2024-01-22 NOTE — ASSESSMENT & PLAN NOTE
Patient's COPD is with exacerbation noted by continued dyspnea currently.  Patient is currently on COPD Pathway. Continue scheduled inhalers Steroids, Antibiotics, and Supplemental oxygen and monitor respiratory status closely.  Patient reports increased shortness of breath and productive cough x1 month which progressively worsened and was seen by her doctor about 1 weeks ago. She was started on levaquin and prednisone and only has one dose left. She denies any improvement in her symptoms. Denies fever or chills.  - Afebrile, WBC 12.6 (on steroids)  - CXR demonstrated no acute intrathoracic process. Subsegmental atelectasis in the right midlung zone.  - procalcitonin 0.02, lactate 3.4  - Venous Blood Gas result:  pO2 36; pCO2 45.9; pH 7.33;  HCO3 24.2, %O2 Sat 90%.  - started Prednisone 60 mg daily x 5 days  - started rocephin and azithromycin  - scheduled DuoNebs ordered, mucinex or airway clearance techniques if needed   - No formal PFTs seen in the chart.  - Will need follow up with pulmonology.

## 2024-01-22 NOTE — ASSESSMENT & PLAN NOTE
-Patient reports drinking Gomez Huang daily was previously drinking 2 pints per day but has cut back to one, last drink on 1/21. Patient reports hand tremors when she goes without drinking but denies previous seizures. Discussed dangers of ETOH withdrawal.  -ETOH level 229.  -I recommended a taper of valium, ativan, or librium which she refused. She states she only wants her home xanax PRN. I discussed the importance of being medically managed if she plans to stop drinking.  -Nursing to perform CIWA assessment. If CIWA rises will attempt to readdress a benzo taper.  -Initiate folic acid, thiamine, and MVI.    -Discussed the dangers of continued ETOH use with Pt and apparent systemic effects of her abuse.    -We discussed different resources as well as addiction psych. Patient is adamant that she will not see a psychiatrist ever.

## 2024-01-22 NOTE — ED TRIAGE NOTES
Patient to ED BIB EMS s/p syncopal episode while daughter was bathing her - patient reports next thing she remembers is waking up to her sister splashing holy water on her. Patient endorses +etoh use tonight. Patient reports wheezing, cough, shortness of breath x 3 weeks. Denies chest pain, abd pain, NVD,  complaints. PMH COPD, asthma. Patient currently aaox4. Tachypnea on arrival but respirations even and unlabored. Skin warm and dry. Plan of care discussed with patient - verbalized understanding. Pending ED workup at this time.

## 2024-01-22 NOTE — SUBJECTIVE & OBJECTIVE
Interval History: Patient continues to have cough with intermittent sputum production. Wheezing on exam. Continue scheduled Duonebs and Po steroids.     Review of Systems  Objective:     Vital Signs (Most Recent):  Temp: 97.3 °F (36.3 °C) (01/22/24 0849)  Pulse: 75 (01/22/24 1122)  Resp: 18 (01/22/24 0849)  BP: (!) 165/77 (01/22/24 0849)  SpO2: (!) 93 % (01/22/24 0849) Vital Signs (24h Range):  Temp:  [97.3 °F (36.3 °C)-98.7 °F (37.1 °C)] 97.3 °F (36.3 °C)  Pulse:  [] 75  Resp:  [14-39] 18  SpO2:  [88 %-100 %] 93 %  BP: (105-172)/(55-81) 165/77     Weight: (!) 141.5 kg (312 lb)  Body mass index is 57.07 kg/m².    Intake/Output Summary (Last 24 hours) at 1/22/2024 1152  Last data filed at 1/22/2024 0417  Gross per 24 hour   Intake 474.8 ml   Output --   Net 474.8 ml         Physical Exam  Vitals and nursing note reviewed.   Constitutional:       General: She is not in acute distress.     Appearance: She is obese. She is ill-appearing. She is not toxic-appearing or diaphoretic.      Interventions: Nasal cannula in place.   HENT:      Head: Normocephalic and atraumatic.      Nose: Nose normal.      Mouth/Throat:      Mouth: Mucous membranes are moist.   Eyes:      Pupils: Pupils are equal, round, and reactive to light.   Cardiovascular:      Rate and Rhythm: Normal rate and regular rhythm.      Pulses: Normal pulses.   Pulmonary:      Effort: Tachypnea present. No accessory muscle usage or respiratory distress.      Breath sounds: Decreased air movement present. Wheezing present. No rhonchi or rales.      Comments: Diffuse expiratory wheezes on bilaterally.   Abdominal:      General: Bowel sounds are normal. There is no distension.      Palpations: Abdomen is soft.      Tenderness: There is no abdominal tenderness. There is no guarding.   Musculoskeletal:         General: Normal range of motion.      Cervical back: Normal range of motion.      Right lower leg: No edema.      Left lower leg: No edema.   Skin:      General: Skin is warm and dry.      Capillary Refill: Capillary refill takes less than 2 seconds.   Neurological:      General: No focal deficit present.      Mental Status: She is alert and oriented to person, place, and time.      Sensory: No sensory deficit.      Motor: No weakness.   Psychiatric:         Attention and Perception: Attention normal.         Mood and Affect: Mood normal.         Speech: Speech normal.         Behavior: Behavior normal. Behavior is cooperative.         Thought Content: Thought content does not include suicidal ideation.

## 2024-01-22 NOTE — PROVIDER PROGRESS NOTES - EMERGENCY DEPT.
Patient was signed out pending CMP and admission for COPD exacerbation and syncope evaluation.  CMP shows mild hypokalemia, oral replacement ordered.  She was intoxicated, alcohol 229, drug screen positive for benzos and opiates, possible polysubstance abuse cause to her syncopal episode.  Will keep on telemetry monitoring.  Lactic acid elevated, unclear as to whether this is secondary to her DuoNeb, less likely sepsis, possible seizure though no report of seizure-like activity.  Plan for admission to hospital medicine.

## 2024-01-22 NOTE — PLAN OF CARE
[facility] - Emergency Dept  Discharge Assessment    Primary Care Provider: Jaxson Oh MD     Discharge Assessment (most recent)       BRIEF DISCHARGE ASSESSMENT - 01/22/24 0017          Discharge Planning    Assessment Type Discharge Planning Assessment (P)      Resource/Environmental Concerns none (P)      Support Systems Family members (P)      Equipment Currently Used at Home none (P)      Current Living Arrangements home (P)      Patient/Family Anticipates Transition to home (P)      Patient/Family Anticipated Services at Transition none (P)      DME Needed Upon Discharge  none (P)      Discharge Plan A Home with family (P)      Discharge Plan B Home with family (P)         Physical Activity    On average, how many days per week do you engage in moderate to strenuous exercise (like a brisk walk)? 0 days (P)      On average, how many minutes do you engage in exercise at this level? 0 min (P)         Financial Resource Strain    How hard is it for you to pay for the very basics like food, housing, medical care, and heating? Not hard at all (P)         Housing Stability    In the last 12 months, was there a time when you were not able to pay the mortgage or rent on time? No (P)      In the last 12 months, how many places have you lived? 1 (P)      In the last 12 months, was there a time when you did not have a steady place to sleep or slept in a shelter (including now)? No (P)         Transportation Needs    In the past 12 months, has lack of transportation kept you from medical appointments or from getting medications? No (P)      In the past 12 months, has lack of transportation kept you from meetings, work, or from getting things needed for daily living? No (P)         Food Insecurity    Within the past 12 months, you worried that your food would run out before you got the money to buy more. Never true (P)      Within the past 12 months, the food you bought just didn't last and you didn't have money to  get more. Never true (P)         Stress    Do you feel stress - tense, restless, nervous, or anxious, or unable to sleep at night because your mind is troubled all the time - these days? Rather much (P)         Social Connections    In a typical week, how many times do you talk on the phone with family, friends, or neighbors? More than three times a week (P)      How often do you get together with friends or relatives? Patient unable to answer (P)      How often do you attend Anabaptist or Worship services? Never (P)      Do you belong to any clubs or organizations such as Anabaptist groups, unions, fraternal or athletic groups, or school groups? No (P)      How often do you attend meetings of the clubs or organizations you belong to? Never (P)      Are you , , , , never , or living with a partner?  (P)         Alcohol Use    Q1: How often do you have a drink containing alcohol? 4 or more times a week (P)      Q2: How many drinks containing alcohol do you have on a typical day when you are drinking? 5 or 6 (P)      Q3: How often do you have six or more drinks on one occasion? Monthly (P)                      Met pt at bedside. Pt completes her adl's independently and ambulates without assistance. Pt stated she consumes alcohol daily, but she is not ready to stop drinking.  Pt stated she is not ready to stop drinking.        Kimo Hinton LMSW  Case Management  Emergency Department  [phone]

## 2024-01-22 NOTE — HPI
Haydee Arreola is a 53 y.o. female with a PMHx of COPD, asthma, nicotine dependence, anxiety, HLD, HTN, CHF (EF 55%), GERD, fatty liver, morbid obesity, thyroid disease, OA, and ETOH abuse who presents to the ED by EMS for worsening shortness of breath and a syncopal episode. The patient reports increased shortness of breath and productive cough x1 month which has progressively worsened. She was seen by her doctor about 1 weeks ago. She was started on levaquin and prednisone and only has one dose left. She denies any improvement in her symptoms. Endorses associated chest tightness and wheezing. Denies fever or chills. The patient also admits to alcohol intake today and expresses anxiety and grief related to the death of her  2 years ago. She reports she also took nyquil for the first time tonight and states she doesn't remember the events of the evening including agreeing to take a bath. Reportedly she passed out after her sister bathed her. The patient denies N/V/D, CP, headache, or dysuria. The patient endorses chronic, intermittent upper abdominal pain but denies significant pain currently. Also notes chronic back and neck pain that is at baseline.    In the ED, patient tachycardic and tachypneic, afebrile. She was initially on 15L non rebreather but able to wean to 2L via nasal cannula. On room air SpO2 87-88%. WBC 12.6. K+ 3.2. Cr 1.2 (bl 0.9). Glucose 154. Lactate 3.4. TSH WNL. ETOH 229. . Troponin <0.006. EKG with SR, 70 bpm, no acute ischemic changes. VBG with pH 7.33, PCO2 45.9, PO2 36, and HCO3 24.2. UDS +benzos and opiates. CXR with no acute intrathoracic process. Subsegmental atelectasis in the right midlung zone. The patient received duo nebs, IV solumedrol, oral potassium replacement, and IVF bolus.

## 2024-01-22 NOTE — ED PROVIDER NOTES
Encounter Date: 1/21/2024       History     Chief Complaint   Patient presents with    Shortness of Breath     Hx COPD, O2 sat 100% on 15 L, no room air O2 sat obtained.  per family Pt went unresponsive/increased SOB while in bath. Pt responsive at this time. +etoh. Received Duoneb with EMS.      53-year-old woman with comorbidities of anxiety, anemia, GERD, COPD not on oxygen, and ongoing tobacco use presents to the ED by EMS for evaluation of worsening shortness of breath today despite outpatient steroids provided by her PCP 4 days ago with accompanying family also endorsing a brief syncopal episode while in the bathtub earlier this evening.  The patient also admits to alcohol intake today and expresses anxiety and grief related to the death of her  2 years ago.  She denies any suicidal ideation at the time my exam.  She does endorse mild-to-moderate shortness of breath without associated fevers or chills.  She reports that her steroids have made her emotionally labile.    The history is provided by the patient and medical records. No  was used.     Review of patient's allergies indicates:   Allergen Reactions    Tramadol Swelling and Anaphylaxis     Throat swelling  Throat swelling    Codeine Other (See Comments)     Unknown reaction  Unknown reaction    Ketorolac Nausea Only     Past Medical History:   Diagnosis Date    Abdominal pain 6/22/2022    Anemia     Anesthesia     PT HAS BULGING DISC/ HERNIATED  DISC C2-C7    Anxiety     Arthritis     RA    Asthma     Back problem     Blood transfusion     Cervical pain     COPD (chronic obstructive pulmonary disease)     CTS (carpal tunnel syndrome)     Edema 6/22/2022    Fatty liver 5/31/2021    GERD (gastroesophageal reflux disease)     Joint pain     shoulder    Joint pain     knee    Liver lesion 5/2/2021    Lumbar pain     Migraine     Neck pain     Panic attacks     Rotator cuff tear     RIGHT    Sciatica     Thyroid disease      Past  Surgical History:   Procedure Laterality Date    APPENDECTOMY       SECTION, LOW TRANSVERSE      x 2    COLONOSCOPY Left 2021    Procedure: COLONOSCOPY;  Surgeon: Akila Li MD;  Location: [facility];  Service: Endoscopy;  Laterality: Left;  BMI 51.67  covid test [place]   pt requested [facility]  cardiology clearance scanned into media tab dated -MS    HYSTERECTOMY      left ankle surg      plates and screws    OOPHORECTOMY      TONSILLECTOMY       Family History   Problem Relation Age of Onset    Diabetes Sister     Breast cancer Maternal Aunt     Breast cancer Paternal Grandmother      Social History     Tobacco Use    Smoking status: Every Day     Current packs/day: 1.50     Average packs/day: 1.5 packs/day for 17.0 years (25.5 ttl pk-yrs)     Types: Cigarettes     Start date: 2007    Smokeless tobacco: Former   Substance Use Topics    Alcohol use: Yes     Alcohol/week: 70.0 standard drinks of alcohol     Types: 70 Shots of liquor per week    Drug use: No     Review of Systems    Physical Exam     Initial Vitals [24 1939]   BP Pulse Resp Temp SpO2   110/78 100 20 98.4 °F (36.9 °C) 100 %      MAP       --         Physical Exam    Vitals reviewed.  Constitutional:   53-year-old  woman, moderately intoxicated, mild respiratory distress noted   HENT:   Head: Normocephalic and atraumatic.   Eyes: EOM are normal. Pupils are equal, round, and reactive to light.   Neck: No thyromegaly present. No tracheal deviation present.   Cardiovascular:            Mild tachycardia noted with intact distal pulses   Pulmonary/Chest:   Mild tachypnea noted, patient is speaking in full sentences, moderate expiratory wheezes noted in all fields, no accessory muscle use noted   Abdominal:   Obese, soft, nontender   Musculoskeletal:         General: No edema. Normal range of motion.     Neurological: She is alert and oriented to person, place, and time.   Speech is slurred consistent with recent  alcohol intake/intoxication   Skin: Skin is warm and dry.   Psychiatric:   Patient is tearful with slurred speech, cooperative with exam         ED Course   Procedures  Labs Reviewed   CBC W/ AUTO DIFFERENTIAL - Abnormal; Notable for the following components:       Result Value    MCH 32.5 (*)     RDW 15.6 (*)     Immature Granulocytes 0.8 (*)     Gran # (ANC) 11.4 (*)     Immature Grans (Abs) 0.10 (*)     Lymph # 0.7 (*)     Gran % 90.3 (*)     Lymph % 5.7 (*)     Mono % 2.9 (*)     All other components within normal limits   COMPREHENSIVE METABOLIC PANEL - Abnormal; Notable for the following components:    Potassium 3.2 (*)     CO2 22 (*)     Glucose 154 (*)     BUN 23 (*)     eGFR 53.5 (*)     All other components within normal limits   B-TYPE NATRIURETIC PEPTIDE - Abnormal; Notable for the following components:     (*)     All other components within normal limits   LACTIC ACID, PLASMA - Abnormal; Notable for the following components:    Lactate (Lactic Acid) 3.4 (*)     All other components within normal limits   ALCOHOL,MEDICAL (ETHANOL) - Abnormal; Notable for the following components:    Alcohol, Serum 229 (*)     All other components within normal limits   DRUG SCREEN PANEL, URINE EMERGENCY - Abnormal; Notable for the following components:    Benzodiazepines Presumptive Positive (*)     Opiate Scrn, Ur Presumptive Positive (*)     All other components within normal limits    Narrative:     Specimen Source->Urine   ALCOHOL,MEDICAL (ETHANOL) - Abnormal; Notable for the following components:    Alcohol, Serum 123 (*)     All other components within normal limits   ISTAT PROCEDURE - Abnormal; Notable for the following components:    POC PH 7.331 (*)     POC PCO2 45.9 (*)     POC PO2 36 (*)     All other components within normal limits   ISTAT LACTATE - Abnormal; Notable for the following components:    POC Lactate 3.59 (*)     All other components within normal limits   TSH   TROPONIN I   MAGNESIUM    PHOSPHORUS   PROCALCITONIN   URINALYSIS, REFLEX TO URINE CULTURE    Narrative:     Specimen Source->Urine     EKG Readings: (Independently Interpreted)   Initial Reading: No STEMI. Rhythm: Normal Sinus Rhythm. Heart Rate: 70. Ectopy: No Ectopy. ST Segments: Normal ST Segments. Axis: Normal.     ECG Results              EKG 12-lead (Final result)  Result time 01/22/24 08:16:59      Final result by Interface, Lab In [facility] (01/22/24 08:16:59)                   Narrative:    Test Reason : R06.02,    Vent. Rate : 070 BPM     Atrial Rate : 070 BPM     P-R Int : 138 ms          QRS Dur : 106 ms      QT Int : 424 ms       P-R-T Axes : 050 069 045 degrees     QTc Int : 457 ms    Normal sinus rhythm  Normal ECG  When compared with ECG of 28-APR-2023 11:23,  No significant change was found  Confirmed by Master AVALOS MD ([id number]) on 1/22/2024 8:16:47 AM    Referred By: [email or web address]   SELF           Confirmed By:Master AVALOS MD                                  Imaging Results              X-Ray Chest AP Portable (Final result)  Result time 01/21/24 21:07:14      Final result by Kaushik Dubois MD (01/21/24 21:07:14)                   Impression:      No acute intrathoracic process.    Subsegmental atelectasis in the right midlung zone.      Electronically signed by: Kaushik Dubois MD  Date:    01/21/2024  Time:    21:07               Narrative:    EXAMINATION:  XR CHEST AP PORTABLE    CLINICAL HISTORY:  Shortness of breath.    TECHNIQUE:  Single frontal view of the chest was performed.    COMPARISON:  03/08/2021.    FINDINGS:  Monitoring EKG leads are present.  The trachea is unremarkable.  The cardiomediastinal silhouette is within normal limits.  There is no evidence of free air beneath the hemidiaphragms.  There are no pleural effusions.  There is no evidence of a pneumothorax.  There is subsegmental atelectasis in the right midlung zone.  There is no focal consolidation.  There are degenerative changes in the osseous structures.                                        Medications   fluticasone furoate-vilanteroL 100-25 mcg/dose diskus inhaler 1 puff (1 puff Inhalation Given 1/22/24 0845)   tiotropium bromide 2.5 mcg/actuation inhaler 2 puff (has no administration in time range)   pantoprazole EC tablet 40 mg (40 mg Oral Given 1/22/24 0849)   montelukast tablet 10 mg (10 mg Oral Given 1/21/24 2227)   sodium chloride 0.9% flush 3 mL (has no administration in time range)   albuterol-ipratropium 2.5 mg-0.5 mg/3 mL nebulizer solution 3 mL (3 mLs Nebulization Given 1/22/24 0845)   enoxaparin injection 60 mg (60 mg Subcutaneous Given 1/22/24 0852)   cefTRIAXone (Rocephin) 1 g in dextrose 5 % in water (D5W) 100 mL IVPB (MB+) (0 g Intravenous Stopped 1/22/24 0008)     And   azithromycin tablet 500 mg (500 mg Oral Given 1/22/24 0849)   ondansetron injection 4 mg (has no administration in time range)   predniSONE tablet 60 mg (60 mg Oral Given 1/22/24 0849)   acetaminophen tablet 650 mg (has no administration in time range)   acetaminophen tablet 1,000 mg (has no administration in time range)   glucose chewable tablet 16 g (has no administration in time range)   glucose chewable tablet 24 g (has no administration in time range)   glucagon (human recombinant) injection 1 mg (has no administration in time range)   dextrose 10% bolus 125 mL 125 mL (has no administration in time range)   dextrose 10% bolus 250 mL 250 mL (has no administration in time range)   atorvastatin tablet 40 mg (40 mg Oral Not Given 1/21/24 2332)   0.9%  NaCl infusion ( Intravenous Verify Only 1/22/24 0417)   ALPRAZolam tablet 1 mg (has no administration in time range)   amLODIPine tablet 10 mg (10 mg Oral Given 1/22/24 0849)   metoprolol succinate (TOPROL-XL) 24 hr tablet 25 mg (25 mg Oral Given 1/22/24 0850)   sertraline tablet 100 mg (100 mg Oral Given 1/22/24 0900)   thiamine tablet 100 mg (100 mg Oral Given 1/22/24 0849)   folic acid tablet 1 mg (1 mg Oral Given 1/22/24 0849)    multivitamin tablet (1 tablet Oral Given 1/22/24 0849)   guaiFENesin 12 hr tablet 600 mg (600 mg Oral Given 1/22/24 0849)   benzonatate capsule 100 mg (has no administration in time range)   senna-docusate 8.6-50 mg per tablet 1 tablet (has no administration in time range)   HYDROcodone-acetaminophen  mg per tablet 1 tablet (has no administration in time range)   albuterol-ipratropium 2.5 mg-0.5 mg/3 mL nebulizer solution 3 mL (3 mLs Nebulization Given 1/21/24 2005)   methylPREDNISolone sodium succinate injection 125 mg (125 mg Intravenous Given 1/21/24 2039)   lactated ringers bolus 1,000 mL (0 mLs Intravenous Stopped 1/21/24 2355)   potassium chloride SA CR tablet 40 mEq (40 mEq Oral Given 1/21/24 2227)     Medical Decision Making  Differential diagnosis: COPD exacerbation, lethal arrhythmia, pleural effusion, respiratory failure, grief reaction    Amount and/or Complexity of Data Reviewed  Labs: ordered. Decision-making details documented in ED Course.  Radiology: ordered. Decision-making details documented in ED Course.  ECG/medicine tests: ordered and independent interpretation performed. Decision-making details documented in ED Course.    Risk  Prescription drug management.              Attending Attestation:             Attending ED Notes:   Patient exhibits improvement of respiratory distress and wheezing with multiple DuoNeb therapies and supplemental oxygen.  Chest x-ray does not reveal evidence of any consolidation or pneumothorax.  EKG does not reveal evidence of acute arrhythmia or ischemic pattern.  The patient is noted to exhibit a mild lactic acidosis for which fluid resuscitation has been initiated with questionable history syncope versus seizure endorsed by accompanying sister.  IV steroids have been administered due to COPD exacerbation with wheezing despite outpatient steroid therapies.  The serum alcohol is notably elevated greater than 200 mg/dL, and drug screen is positive for  opiates and benzodiazepines in this patient exhibiting evidence of acute intoxication.  Due to end of shift, ultimate disposition and final interpretation of remaining laboratory screenings has been transferred to Dr. Suri John.  Please see her accompanying documentation for further details.                             Clinical Impression:  Final diagnoses:  [R06.02] Shortness of breath  [J44.1] COPD with acute exacerbation (Primary)  [F10.929] Acute alcoholic intoxication with complication  [R40.20] Loss of consciousness          ED Disposition Condition    Observation Stable                Scotty Verduzco MD  01/22/24 1040

## 2024-01-22 NOTE — ASSESSMENT & PLAN NOTE
Chronic, controlled. Latest blood pressure and vitals reviewed-     Temp:  [98 °F (36.7 °C)-98.7 °F (37.1 °C)]   Pulse:  []   Resp:  [14-39]   BP: (105-172)/(55-81)   SpO2:  [88 %-100 %] .   Home meds for hypertension were reviewed and noted below.   Hypertension Medications               amLODIPine (NORVASC) 10 MG tablet Take 10 mg by mouth once daily.    ENTRESTO 24-26 mg per tablet Take 1 tablet by mouth 2 (two) times daily.    furosemide (LASIX) 40 MG tablet Take 60 mg by mouth.    metoprolol succinate (TOPROL-XL) 25 MG 24 hr tablet Take 25 mg by mouth once daily.    nitroGLYCERIN (NITROSTAT) 0.4 MG SL tablet DISSOLVE 1 TABLET UNDER THE TONGUE EVERY 5 MINS AS NEEDED FOR CHEST PAIN FOR UP TO 3 DOSES. CALL 911 IF NO RELIEF    spironolactone (ALDACTONE) 25 MG tablet spironolactone Take No date recorded No form recorded No frequency recorded No route recorded No set duration recorded No set duration amount recorded active No dosage strength recorded No dosage strength units of measure recorded            While in the hospital, will manage blood pressure as follows; Adjust home antihypertensive regimen as follows- Hold entresto, lasix, and aldactone in the setting of BRYCE.    Will utilize p.r.n. blood pressure medication only if patient's blood pressure greater than 180/110 and she develops symptoms such as worsening chest pain or shortness of breath.

## 2024-01-22 NOTE — PROGRESS NOTES
[facility] [address] [place] [address] Medicine  Progress Note    Patient Name: Haydee Arreola  MRN: [id number]  Patient Class: IP- Inpatient   Admission Date: 1/21/2024  Length of Stay: 0 days  Attending Physician: Norma Sheriff MD  Primary Care Provider: Jaxson Oh MD        Subjective:     Principal Problem:Chronic obstructive pulmonary disease with acute exacerbation        HPI:  Haydee Arreola is a 53 y.o. female with a PMHx of COPD, asthma, nicotine dependence, anxiety, HLD, HTN, CHF (EF 55%), GERD, fatty liver, morbid obesity, thyroid disease, OA, and ETOH abuse who presents to the ED by EMS for worsening shortness of breath and a syncopal episode. The patient reports increased shortness of breath and productive cough x1 month which has progressively worsened. She was seen by her doctor about 1 weeks ago. She was started on levaquin and prednisone and only has one dose left. She denies any improvement in her symptoms. Endorses associated chest tightness and wheezing. Denies fever or chills. The patient also admits to alcohol intake today and expresses anxiety and grief related to the death of her  2 years ago. She reports she also took nyquil for the first time tonight and states she doesn't remember the events of the evening including agreeing to take a bath. Reportedly she passed out after her sister bathed her. The patient denies N/V/D, CP, headache, or dysuria. The patient endorses chronic, intermittent upper abdominal pain but denies significant pain currently. Also notes chronic back and neck pain that is at baseline.    In the ED, patient tachycardic and tachypneic, afebrile. She was initially on 15L non rebreather but able to wean to 2L via nasal cannula. On room air SpO2 87-88%. WBC 12.6. K+ 3.2. Cr 1.2 (bl 0.9). Glucose 154. Lactate 3.4. TSH WNL. ETOH 229. . Troponin <0.006. EKG with SR, 70 bpm, no acute ischemic changes. VBG with pH 7.33, PCO2 45.9, PO2 36, and HCO3  24.2. UDS +benzos and opiates. CXR with no acute intrathoracic process. Subsegmental atelectasis in the right midlung zone. The patient received duo nebs, IV solumedrol, oral potassium replacement, and IVF bolus.    Overview/Hospital Course:  No notes on file    Interval History: Patient continues to have cough with intermittent sputum production. Wheezing on exam. Continue scheduled Duonebs and Po steroids.     Review of Systems  Objective:     Vital Signs (Most Recent):  Temp: 97.3 °F (36.3 °C) (01/22/24 0849)  Pulse: 75 (01/22/24 1122)  Resp: 18 (01/22/24 0849)  BP: (!) 165/77 (01/22/24 0849)  SpO2: (!) 93 % (01/22/24 0849) Vital Signs (24h Range):  Temp:  [97.3 °F (36.3 °C)-98.7 °F (37.1 °C)] 97.3 °F (36.3 °C)  Pulse:  [] 75  Resp:  [14-39] 18  SpO2:  [88 %-100 %] 93 %  BP: (105-172)/(55-81) 165/77     Weight: (!) 141.5 kg (312 lb)  Body mass index is 57.07 kg/m².    Intake/Output Summary (Last 24 hours) at 1/22/2024 1152  Last data filed at 1/22/2024 0417  Gross per 24 hour   Intake 474.8 ml   Output --   Net 474.8 ml         Physical Exam  Vitals and nursing note reviewed.   Constitutional:       General: She is not in acute distress.     Appearance: She is obese. She is ill-appearing. She is not toxic-appearing or diaphoretic.      Interventions: Nasal cannula in place.   HENT:      Head: Normocephalic and atraumatic.      Nose: Nose normal.      Mouth/Throat:      Mouth: Mucous membranes are moist.   Eyes:      Pupils: Pupils are equal, round, and reactive to light.   Cardiovascular:      Rate and Rhythm: Normal rate and regular rhythm.      Pulses: Normal pulses.   Pulmonary:      Effort: Tachypnea present. No accessory muscle usage or respiratory distress.      Breath sounds: Decreased air movement present. Wheezing present. No rhonchi or rales.      Comments: Diffuse expiratory wheezes on bilaterally.   Abdominal:      General: Bowel sounds are normal. There is no distension.      Palpations:  Abdomen is soft.      Tenderness: There is no abdominal tenderness. There is no guarding.   Musculoskeletal:         General: Normal range of motion.      Cervical back: Normal range of motion.      Right lower leg: No edema.      Left lower leg: No edema.   Skin:     General: Skin is warm and dry.      Capillary Refill: Capillary refill takes less than 2 seconds.   Neurological:      General: No focal deficit present.      Mental Status: She is alert and oriented to person, place, and time.      Sensory: No sensory deficit.      Motor: No weakness.   Psychiatric:         Attention and Perception: Attention normal.         Mood and Affect: Mood normal.         Speech: Speech normal.         Behavior: Behavior normal. Behavior is cooperative.         Thought Content: Thought content does not include suicidal ideation.               Assessment/Plan:      * Chronic obstructive pulmonary disease with acute exacerbation  Patient's COPD is with exacerbation noted by continued dyspnea currently.  Patient is currently on COPD Pathway. Continue scheduled inhalers Steroids, Antibiotics, and Supplemental oxygen and monitor respiratory status closely.  Patient reports increased shortness of breath and productive cough x1 month which progressively worsened and was seen by her doctor about 1 weeks ago. She was started on levaquin and prednisone and only has one dose left. She denies any improvement in her symptoms. Denies fever or chills.  - Afebrile, WBC 12.6 (on steroids)  - CXR demonstrated no acute intrathoracic process. Subsegmental atelectasis in the right midlung zone.  - procalcitonin 0.02, lactate 3.4  - Venous Blood Gas result:  pO2 36; pCO2 45.9; pH 7.33;  HCO3 24.2, %O2 Sat 90%.  - Prednisone 60 mg daily x 5 days  - rocephin and azithromycin  - scheduled DuoNebs ordered, mucinex or airway clearance techniques if needed   - No formal PFTs seen in the chart.  - Will need follow up with  "pulmonology.    Hypokalemia  Patient has hypokalemia which is Acute and currently uncontrolled. Most recent potassium levels reviewed-   Lab Results   Component Value Date    K 3.2 (L) 01/21/2024   . Will continue potassium replacement per protocol and recheck repeat levels after replacement completed.     HLD (hyperlipidemia)   Patient is chronically on statin.will continue for now. Monitor clinically. Last LDL was No results found for: "LDLCALC"     HTN (hypertension)  Chronic, controlled. Latest blood pressure and vitals reviewed-     Temp:  [98 °F (36.7 °C)-98.7 °F (37.1 °C)]   Pulse:  []   Resp:  [14-39]   BP: (105-172)/(55-81)   SpO2:  [88 %-100 %] .   Home meds for hypertension were reviewed and noted below.   Hypertension Medications               amLODIPine (NORVASC) 10 MG tablet Take 10 mg by mouth once daily.    ENTRESTO 24-26 mg per tablet Take 1 tablet by mouth 2 (two) times daily.    furosemide (LASIX) 40 MG tablet Take 60 mg by mouth.    metoprolol succinate (TOPROL-XL) 25 MG 24 hr tablet Take 25 mg by mouth once daily.    nitroGLYCERIN (NITROSTAT) 0.4 MG SL tablet DISSOLVE 1 TABLET UNDER THE TONGUE EVERY 5 MINS AS NEEDED FOR CHEST PAIN FOR UP TO 3 DOSES. CALL 911 IF NO RELIEF    spironolactone (ALDACTONE) 25 MG tablet spironolactone Take No date recorded No form recorded No frequency recorded No route recorded No set duration recorded No set duration amount recorded active No dosage strength recorded No dosage strength units of measure recorded            While in the hospital, will manage blood pressure as follows; Adjust home antihypertensive regimen as follows- Hold entresto, lasix, and aldactone in the setting of BRYCE.    Will utilize p.r.n. blood pressure medication only if patient's blood pressure greater than 180/110 and she develops symptoms such as worsening chest pain or shortness of breath.    Acute hypoxemic respiratory failure  Patient with Hypoxic Respiratory failure which is " Acute.  she is not on home oxygen. Supplemental oxygen was provided and noted-      .   Signs/symptoms of respiratory failure include- tachypnea, respiratory distress, and wheezing. Contributing diagnoses includes - COPD Labs and images were reviewed. Patient Has recent ABG, which has been reviewed. Will treat underlying causes and adjust management of respiratory failure as follows-   -Continue scheduled inhalers, steroids, abx, and supplemental oxygen.  -Wean oxygen as tolerated, SpO2 goal 88-92%    Osteoarthritis  -Chronic issue.  - reviewed, continue PRN norco.    Chronic diastolic congestive heart failure  Patient is identified as having Diastolic (HFpEF) heart failure that is Chronic. CHF is currently controlled. Latest ECHO performed and demonstrates- Results for orders placed during the hospital encounter of 07/05/22    Echo    Interpretation Summary  · The left ventricle is mildly enlarged with concentric hypertrophy and normal systolic function.  · The estimated ejection fraction is 55%.  · Normal left ventricular diastolic function.  · Normal right ventricular size with normal right ventricular systolic function.  · Mild left atrial enlargement.  · Mild mitral regurgitation.  · Normal central venous pressure (3 mmHg).  · The estimated PA systolic pressure is 34 mmHg.  . Continue Beta Blocker and monitor clinical status closely. Monitor on telemetry. Patient is off CHF pathway.  Monitor strict Is&Os and daily weights. Cardiology has not been consulted. Continue to stress to patient importance of self efficacy and  on diet for CHF. Last BNP reviewed- and noted below   Recent Labs   Lab 01/21/24 2000   *   Patient appears hypovolemic on exam. Hold diuretics. Also has an BRYCE, will hold entresto as well.    Cigarette nicotine dependence without complication  Dangers of cigarette smoking were reviewed with patient in detail. Patient was Counseled for 3-10 minutes. Nicotine replacement options  were discussed. Nicotine replacement was discussed- not prescribed per patient's request    ETOH abuse  -Patient reports drinking Gomez Huang daily was previously drinking 2 pints per day but has cut back to one, last drink on 1/21. Patient reports hand tremors when she goes without drinking but denies previous seizures. Discussed dangers of ETOH withdrawal.  -ETOH level 229.  -I recommended a taper of valium, ativan, or librium which she refused. She states she only wants her home xanax PRN. I discussed the importance of being medically managed if she plans to stop drinking.  -Nursing to perform CIWA assessment. If CIWA rises will attempt to readdress a benzo taper.  -Initiate folic acid, thiamine, and MVI.    -Discussed the dangers of continued ETOH use with Pt and apparent systemic effects of her abuse.    -We discussed different resources as well as addiction psych. Patient is adamant that she will not see a psychiatrist ever.     Morbid obesity with BMI of 50.0-59.9, adult  Body mass index is 54.5 kg/m². Morbid obesity complicates all aspects of disease management from diagnostic modalities to treatment. Weight loss encouraged and health benefits explained to patient.    BRYCE (acute kidney injury)  Patient with acute kidney injury/acute renal failure likely due to pre-renal azotemia due to dehydration BRYCE is currently stable. Baseline creatinine  0.9  - Labs reviewed- Renal function/electrolytes with Estimated Creatinine Clearance: 70.3 mL/min (based on SCr of 1.2 mg/dL). according to latest data. Monitor urine output and serial BMP and adjust therapy as needed. Avoid nephrotoxins and renally dose meds for GFR listed above.  -Received IVF bolus in the ED, continue IVF overnight.  -Hold diuretics.    Syncope  Likely multiple factors contributed including acute hypoxemic respiratory failure, dehydration, and polypharmacy.  We discussed she can not mix alcohol, benzos, opiates, and over the counter medicines.  She reports drinking and taking nyquil earlier this evening and does not remember the majority of the night including passing out in the bath. Reports she did not take xanax or percocet today.  -WBC 12.6 (recent steroid use). Cr 1.2.   -, Troponin <0.006.  -Lactate 3.4, repeat after IVF.  -Serum alcohol 229. UDS +benzos and opiates  -UA negative.  -EKG negative for acute ischemic changes.  -CXR negative for acute findings.  -Cardiac monitoring.  -Check Orthostatics.   -2D Echocardiogram.   -Fall precautions.     GERD without esophagitis  -Chronic, controlled.  -Continue PPI.    Asthma  -Continue daily inhalers and singulair.    Anxiety  -Chronic, uncontrolled.  -Continue zoloft.  - reviewed, continue PRN xanax.  -Adamantly against seeing psychiatrist or psychologist.    Fatty liver  -Chronic issue.  -LFTs WNL.  -Follows with hepatology outpatient.      VTE Risk Mitigation (From admission, onward)           Ordered     enoxaparin injection 60 mg  Every 12 hours         01/21/24 2200     IP VTE HIGH RISK PATIENT  Once         01/21/24 2200     Place sequential compression device  Until discontinued         01/21/24 2200                    Discharge Planning   SHARON:      Code Status: Full Code   Is the patient medically ready for discharge?:     Reason for patient still in hospital (select all that apply): Patient trending condition  Discharge Plan A: Home with family                  Norma Sheriff MD  Department of Hospital Medicine   [facility]

## 2024-01-22 NOTE — ASSESSMENT & PLAN NOTE
Body mass index is 54.5 kg/m². Morbid obesity complicates all aspects of disease management from diagnostic modalities to treatment. Weight loss encouraged and health benefits explained to patient.

## 2024-01-22 NOTE — ASSESSMENT & PLAN NOTE
Patient has hypokalemia which is Acute and currently uncontrolled. Most recent potassium levels reviewed-   Lab Results   Component Value Date    K 3.2 (L) 01/21/2024   . Will continue potassium replacement per protocol and recheck repeat levels after replacement completed.

## 2024-01-22 NOTE — NURSING
Patient awake, alert, and oriented x 4. Lying in bed-semi fowlers position. NAD noted. Respirations are even and unlabored. Tele monitor in place. Plan of care reviewed. Education provided. Instruction given on use of call light. Bed locked and in lowest position. All safety measures are in place. Communication board updated with direct nursing extension. RN will continue to monitor.

## 2024-01-22 NOTE — ASSESSMENT & PLAN NOTE
Patient is identified as having Diastolic (HFpEF) heart failure that is Chronic. CHF is currently controlled. Latest ECHO performed and demonstrates- Results for orders placed during the hospital encounter of 07/05/22    Echo    Interpretation Summary  · The left ventricle is mildly enlarged with concentric hypertrophy and normal systolic function.  · The estimated ejection fraction is 55%.  · Normal left ventricular diastolic function.  · Normal right ventricular size with normal right ventricular systolic function.  · Mild left atrial enlargement.  · Mild mitral regurgitation.  · Normal central venous pressure (3 mmHg).  · The estimated PA systolic pressure is 34 mmHg.  . Continue Beta Blocker and monitor clinical status closely. Monitor on telemetry. Patient is off CHF pathway.  Monitor strict Is&Os and daily weights. Cardiology has not been consulted. Continue to stress to patient importance of self efficacy and  on diet for CHF. Last BNP reviewed- and noted below   Recent Labs   Lab 01/21/24 2000   *   Patient appears hypovolemic on exam. Hold diuretics. Also has an BRYCE, will hold entresto as well.

## 2024-01-22 NOTE — PLAN OF CARE
Problem: Adjustment to Illness COPD (Chronic Obstructive Pulmonary Disease)  Goal: Optimal Chronic Illness Coping  Outcome: Ongoing, Progressing     Problem: Functional Ability Impaired COPD (Chronic Obstructive Pulmonary Disease)  Goal: Optimal Level of Functional [place]  Outcome: Ongoing, Progressing     Problem: Infection COPD (Chronic Obstructive Pulmonary Disease)  Goal: Absence of Infection Signs and Symptoms  Outcome: Ongoing, Progressing     Problem: Oral Intake Inadequate COPD (Chronic Obstructive Pulmonary Disease)  Goal: Improved Nutrition Intake  Outcome: Ongoing, Progressing     Problem: Respiratory Compromise COPD (Chronic Obstructive Pulmonary Disease)  Goal: Effective Oxygenation and Ventilation  Outcome: Ongoing, Progressing     Problem: Adult Inpatient Plan of Care  Goal: Plan of Care Review  Outcome: Ongoing, Progressing  Goal: Patient-Specific Goal (Individualized)  Outcome: Ongoing, Progressing  Goal: Absence of Hospital-Acquired Illness or Injury  Outcome: Ongoing, Progressing  Goal: Optimal Comfort and Wellbeing  Outcome: Ongoing, Progressing  Goal: Readiness for Transition of Care  Outcome: Ongoing, Progressing     Problem: Bariatric Environmental Safety  Goal: Safety Maintained with Care  Outcome: Ongoing, Progressing     Problem: Fluid and Electrolyte Imbalance (Acute Kidney Injury/Impairment)  Goal: Fluid and Electrolyte Balance  Outcome: Ongoing, Progressing     Problem: Oral Intake Inadequate (Acute Kidney Injury/Impairment)  Goal: Optimal Nutrition Intake  Outcome: Ongoing, Progressing     Problem: Renal Function Impairment (Acute Kidney Injury/Impairment)  Goal: Effective Renal Function  Outcome: Ongoing, Progressing

## 2024-01-22 NOTE — PLAN OF CARE
Problem: Adjustment to Illness COPD (Chronic Obstructive Pulmonary Disease)  Goal: Optimal Chronic Illness Coping  Outcome: Ongoing, Progressing     Problem: Functional Ability Impaired COPD (Chronic Obstructive Pulmonary Disease)  Goal: Optimal Level of Functional [place]  Outcome: Ongoing, Progressing     Problem: Infection COPD (Chronic Obstructive Pulmonary Disease)  Goal: Absence of Infection Signs and Symptoms  Outcome: Ongoing, Progressing     Problem: Oral Intake Inadequate COPD (Chronic Obstructive Pulmonary Disease)  Goal: Improved Nutrition Intake  Outcome: Ongoing, Progressing     Problem: Respiratory Compromise COPD (Chronic Obstructive Pulmonary Disease)  Goal: Effective Oxygenation and Ventilation  Outcome: Ongoing, Progressing     Problem: Adult Inpatient Plan of Care  Goal: Plan of Care Review  Outcome: Ongoing, Progressing  Goal: Patient-Specific Goal (Individualized)  Outcome: Ongoing, Progressing  Goal: Absence of Hospital-Acquired Illness or Injury  Outcome: Ongoing, Progressing  Goal: Optimal Comfort and Wellbeing  Outcome: Ongoing, Progressing     Problem: Bariatric Environmental Safety  Goal: Safety Maintained with Care  Outcome: Ongoing, Progressing     Problem: Fluid and Electrolyte Imbalance (Acute Kidney Injury/Impairment)  Goal: Fluid and Electrolyte Balance  Outcome: Ongoing, Progressing

## 2024-01-23 NOTE — HOSPITAL COURSE
Patient continues to have cough with intermittent sputum production. Wheezing on exam. Continue scheduled Duonebs and Po steroids. On 1/22 aroun 14:31 RN informed that patient wants to leave AMA. Patient left AMA soon after before signing the AMA form. Patient d/c as AMA.

## 2024-01-23 NOTE — DISCHARGE SUMMARY
[facility] [address] [place] [address] Medicine  Discharge Summary      Patient Name: Haydee Arreola  MRN: [id number]  ALEX: [id number]  Patient Class: IP- Inpatient  Admission Date: 1/21/2024  Hospital Length of Stay: 1 days  Discharge Date and Time:  01/23/2024 2:36 PM  Attending Physician: Gin att. providers found   Discharging Provider: Norma Sheriff MD  Primary Care Provider: Jaxson Oh MD  [facility] Medicine Team: [facility] Norma Sheriff MD  Primary Care Team: [facility]    HPI:   Haydee Arreola is a 53 y.o. female with a PMHx of COPD, asthma, nicotine dependence, anxiety, HLD, HTN, CHF (EF 55%), GERD, fatty liver, morbid obesity, thyroid disease, OA, and ETOH abuse who presents to the ED by EMS for worsening shortness of breath and a syncopal episode. The patient reports increased shortness of breath and productive cough x1 month which has progressively worsened. She was seen by her doctor about 1 weeks ago. She was started on levaquin and prednisone and only has one dose left. She denies any improvement in her symptoms. Endorses associated chest tightness and wheezing. Denies fever or chills. The patient also admits to alcohol intake today and expresses anxiety and grief related to the death of her  2 years ago. She reports she also took nyquil for the first time tonight and states she doesn't remember the events of the evening including agreeing to take a bath. Reportedly she passed out after her sister bathed her. The patient denies N/V/D, CP, headache, or dysuria. The patient endorses chronic, intermittent upper abdominal pain but denies significant pain currently. Also notes chronic back and neck pain that is at baseline.    In the ED, patient tachycardic and tachypneic, afebrile. She was initially on 15L non rebreather but able to wean to 2L via nasal cannula. On room air SpO2 87-88%. WBC 12.6. K+ 3.2. Cr 1.2 (bl 0.9). Glucose 154. Lactate 3.4. TSH WNL. ETOH 229. .  Troponin <0.006. EKG with SR, 70 bpm, no acute ischemic changes. VBG with pH 7.33, PCO2 45.9, PO2 36, and HCO3 24.2. UDS +benzos and opiates. CXR with no acute intrathoracic process. Subsegmental atelectasis in the right midlung zone. The patient received duo nebs, IV solumedrol, oral potassium replacement, and IVF bolus.    * No surgery found *      Hospital Course:   Patient continues to have cough with intermittent sputum production. Wheezing on exam. Continue scheduled Duonebs and Po steroids. On 1/22 aroun 14:31 RN informed that patient wants to leave AMA. Patient left AMA soon after before signing the AMA form. Patient d/c as AMA.           Goals of Care Treatment Preferences:  Code Status: Full Code      Consults:     No new Assessment & Plan notes have been filed under this hospital service since the last note was generated.  Service: Hospital Medicine    Final Active Diagnoses:    Diagnosis Date Noted POA    PRINCIPAL PROBLEM:  Chronic obstructive pulmonary disease with acute exacerbation [J44.1] 04/28/2023 Yes    ETOH abuse [F10.10] 01/22/2024 Yes    Cigarette nicotine dependence without complication [F17.210] 01/22/2024 Yes    Chronic diastolic congestive heart failure [I50.32] 01/22/2024 Yes    Osteoarthritis [M19.90] 01/22/2024 Yes    Acute hypoxemic respiratory failure [J96.01] 01/22/2024 Yes    HTN (hypertension) [I10] 01/22/2024 Yes    HLD (hyperlipidemia) [E78.5] 01/22/2024 Yes    Hypokalemia [E87.6] 01/22/2024 Yes    Syncope [R55] 01/21/2024 Yes    BRYCE (acute kidney injury) [N17.9] 01/21/2024 Yes    Morbid obesity with BMI of 50.0-59.9, adult [E66.01, Z68.43] 01/21/2024 Not Applicable    Asthma [J45.909] 04/28/2023 Yes    Fatty liver [K76.0] 05/31/2021 Yes    Anxiety [F41.9] 05/20/2020 Yes    GERD without esophagitis [K21.9] 11/20/2018 Yes      Problems Resolved During this Admission:           Disposition: Left Against Medical Adv*    Indwelling Lines/Drains at time of discharge:    Lines/Drains/Airways       None                   Time spent on the discharge of patient: 35 minutes         Norma Sheriff MD  Department of Hospital Medicine  [facility]

## 2024-02-07 ENCOUNTER — TELEPHONE (OUTPATIENT)
Dept: HEPATOLOGY | Facility: CLINIC | Age: 54
End: 2024-02-07
Payer: MEDICAID

## 2024-04-22 PROBLEM — J96.01 ACUTE HYPOXEMIC RESPIRATORY FAILURE: Status: RESOLVED | Noted: 2024-01-22 | Resolved: 2024-04-22

## 2024-04-22 PROBLEM — N17.9 AKI (ACUTE KIDNEY INJURY): Status: RESOLVED | Noted: 2024-01-21 | Resolved: 2024-04-22

## 2024-07-02 DIAGNOSIS — N76.1 CHRONIC VAGINITIS: ICD-10-CM

## 2024-07-02 RX ORDER — TERCONAZOLE 8 MG/G
CREAM VAGINAL
Qty: 20 G | Refills: 1 | Status: SHIPPED | OUTPATIENT
Start: 2024-07-02

## 2024-07-02 NOTE — TELEPHONE ENCOUNTER
Refill Routing Note   Medication(s) are not appropriate for processing by [facility] for the following reason(s):        Outside of protocol: non-delegated    [facility] action(s):  Route      Medication Therapy Plan:         Appointments  past 12m or future 3m with PCP    Date Provider   Last Visit   6/20/2022 Hakan Duong MD   Next Visit   Visit date not found Hakan Duong MD   ED visits in past 90 days: 0        Note composed:1:32 PM 07/02/2024

## 2024-07-23 ENCOUNTER — HOSPITAL ENCOUNTER (EMERGENCY)
Facility: HOSPITAL | Age: 54
Discharge: HOME | End: 2024-07-23
Attending: EMERGENCY MEDICINE
Payer: MEDICAID

## 2024-07-23 VITALS
HEART RATE: 74 BPM | DIASTOLIC BLOOD PRESSURE: 83 MMHG | RESPIRATION RATE: 20 BRPM | TEMPERATURE: 98 F | BODY MASS INDEX: 53.96 KG/M2 | OXYGEN SATURATION: 94 % | SYSTOLIC BLOOD PRESSURE: 135 MMHG | WEIGHT: 293 LBS

## 2024-07-23 DIAGNOSIS — M79.602 LEFT ARM PAIN: ICD-10-CM

## 2024-07-23 DIAGNOSIS — T14.8XXA CONTUSION OF SOFT TISSUE: ICD-10-CM

## 2024-07-23 DIAGNOSIS — M25.561 ACUTE PAIN OF RIGHT KNEE: ICD-10-CM

## 2024-07-23 DIAGNOSIS — M79.18 MUSCULOSKELETAL PAIN: ICD-10-CM

## 2024-07-23 DIAGNOSIS — M25.551 ACUTE HIP PAIN, RIGHT: ICD-10-CM

## 2024-07-23 DIAGNOSIS — M79.675 TOE PAIN, LEFT: ICD-10-CM

## 2024-07-23 DIAGNOSIS — J44.1 COPD EXACERBATION: Primary | ICD-10-CM
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
## 2024-07-23 DIAGNOSIS — M25.572 ANKLE PAIN, LEFT: ICD-10-CM

## 2024-07-23 LAB
ALBUMIN SERPL-MCNC: 3.9 G/DL (ref 3.3–5.5)
ALP SERPL-CCNC: 85 U/L (ref 42–141)
BILIRUB SERPL-MCNC: 0.6 MG/DL (ref 0.2–1.6)
BUN SERPL-MCNC: 15 MG/DL (ref 7–22)
CALCIUM SERPL-MCNC: 9.6 MG/DL (ref 8–10.3)
CHLORIDE SERPL-SCNC: 112 MMOL/L (ref 98–108)
CREAT SERPL-MCNC: 0.9 MG/DL (ref 0.6–1.2)
GLUCOSE SERPL-MCNC: 93 MG/DL (ref 73–118)
HCT, POC: NORMAL
HGB, POC: NORMAL (ref 14–18)
MCH, POC: NORMAL
MCHC, POC: NORMAL
MCV, POC: NORMAL
MPV, POC: NORMAL
POC ALT (SGPT): 25 U/L (ref 10–47)
POC AST (SGOT): 32 U/L (ref 11–38)
POC B-TYPE NATRIURETIC PEPTIDE: 94.4 PG/ML (ref 0–100)
POC CARDIAC TROPONIN I: 0 NG/ML (ref 0–0.08)
POC PLATELET COUNT: NORMAL
POC TCO2: 29 MMOL/L (ref 18–33)
POTASSIUM BLD-SCNC: 4.9 MMOL/L (ref 3.6–5.1)
PROTEIN, POC: 6.7 G/DL (ref 6.4–8.1)
RBC, POC: NORMAL
RDW, POC: NORMAL
SAMPLE: NORMAL
SODIUM BLD-SCNC: 144 MMOL/L (ref 128–145)
WBC, POC: NORMAL

## 2024-07-23 PROCEDURE — 96374 THER/PROPH/DIAG INJ IV PUSH: CPT | Mod: ER

## 2024-07-23 PROCEDURE — 83880 ASSAY OF NATRIURETIC PEPTIDE: CPT | Mod: ER

## 2024-07-23 PROCEDURE — 80053 COMPREHEN METABOLIC PANEL: CPT | Mod: ER

## 2024-07-23 PROCEDURE — 25000003 PHARM REV CODE 250: Mod: ER | Performed by: EMERGENCY MEDICINE

## 2024-07-23 PROCEDURE — 94640 AIRWAY INHALATION TREATMENT: CPT | Mod: ER

## 2024-07-23 PROCEDURE — 85025 COMPLETE CBC W/AUTO DIFF WBC: CPT | Mod: ER

## 2024-07-23 PROCEDURE — 94761 N-INVAS EAR/PLS OXIMETRY MLT: CPT | Mod: ER

## 2024-07-23 PROCEDURE — 90715 TDAP VACCINE 7 YRS/> IM: CPT | Mod: ER | Performed by: EMERGENCY MEDICINE

## 2024-07-23 PROCEDURE — 84484 ASSAY OF TROPONIN QUANT: CPT | Mod: ER

## 2024-07-23 PROCEDURE — 25000242 PHARM REV CODE 250 ALT 637 W/ HCPCS: Mod: ER | Performed by: EMERGENCY MEDICINE

## 2024-07-23 PROCEDURE — 99900035 HC TECH TIME PER 15 MIN (STAT): Mod: ER

## 2024-07-23 PROCEDURE — 99285 EMERGENCY DEPT VISIT HI MDM: CPT | Mod: 25,ER

## 2024-07-23 PROCEDURE — 90471 IMMUNIZATION ADMIN: CPT | Mod: ER | Performed by: EMERGENCY MEDICINE

## 2024-07-23 PROCEDURE — 96375 TX/PRO/DX INJ NEW DRUG ADDON: CPT | Mod: ER

## 2024-07-23 PROCEDURE — 27100107 HC POCKET PEAK FLOW METER: Mod: ER

## 2024-07-23 PROCEDURE — 63600175 PHARM REV CODE 636 W HCPCS: Mod: ER | Performed by: EMERGENCY MEDICINE

## 2024-07-23 RX ORDER — ACETAMINOPHEN 500 MG
500 TABLET ORAL EVERY 6 HOURS PRN
Qty: 30 TABLET | Refills: 0 | Status: SHIPPED | OUTPATIENT
Start: 2024-07-23

## 2024-07-23 RX ORDER — PREDNISONE 20 MG/1
40 TABLET ORAL DAILY
Qty: 10 TABLET | Refills: 0 | Status: SHIPPED | OUTPATIENT
Start: 2024-07-23 | End: 2024-07-28

## 2024-07-23 RX ORDER — DICLOFENAC SODIUM 10 MG/G
2 GEL TOPICAL 4 TIMES DAILY PRN
Qty: 200 G | Refills: 0 | Status: SHIPPED | OUTPATIENT
Start: 2024-07-23

## 2024-07-23 RX ORDER — METHYLPREDNISOLONE SOD SUCC 125 MG
125 VIAL (EA) INJECTION
Status: COMPLETED | OUTPATIENT
Start: 2024-07-23 | End: 2024-07-23

## 2024-07-23 RX ORDER — METHOCARBAMOL 500 MG/1
1000 TABLET, FILM COATED ORAL 3 TIMES DAILY
Qty: 30 TABLET | Refills: 0 | Status: SHIPPED | OUTPATIENT
Start: 2024-07-23 | End: 2024-07-28

## 2024-07-23 RX ORDER — FAMOTIDINE 10 MG/ML
40 INJECTION INTRAVENOUS
Status: COMPLETED | OUTPATIENT
Start: 2024-07-23 | End: 2024-07-23

## 2024-07-23 RX ORDER — ONDANSETRON HYDROCHLORIDE 2 MG/ML
8 INJECTION, SOLUTION INTRAVENOUS
Status: COMPLETED | OUTPATIENT
Start: 2024-07-23 | End: 2024-07-23

## 2024-07-23 RX ORDER — ALBUTEROL SULFATE 90 UG/1
2 AEROSOL, METERED RESPIRATORY (INHALATION) EVERY 6 HOURS PRN
Qty: 18 G | Refills: 0 | Status: SHIPPED | OUTPATIENT
Start: 2024-07-23 | End: 2025-07-23

## 2024-07-23 RX ORDER — METHOCARBAMOL 500 MG/1
1000 TABLET, FILM COATED ORAL
Status: COMPLETED | OUTPATIENT
Start: 2024-07-23 | End: 2024-07-23

## 2024-07-23 RX ORDER — IPRATROPIUM BROMIDE AND ALBUTEROL SULFATE 2.5; .5 MG/3ML; MG/3ML
3 SOLUTION RESPIRATORY (INHALATION)
Status: COMPLETED | OUTPATIENT
Start: 2024-07-23 | End: 2024-07-23

## 2024-07-23 RX ORDER — KETOROLAC TROMETHAMINE 30 MG/ML
15 INJECTION, SOLUTION INTRAMUSCULAR; INTRAVENOUS
Status: COMPLETED | OUTPATIENT
Start: 2024-07-23 | End: 2024-07-23

## 2024-07-23 RX ORDER — ALBUTEROL SULFATE 2.5 MG/.5ML
2.5 SOLUTION RESPIRATORY (INHALATION) EVERY 4 HOURS PRN
Qty: 30 EACH | Refills: 0 | Status: SHIPPED | OUTPATIENT
Start: 2024-07-23 | End: 2025-07-23

## 2024-07-23 RX ADMIN — FAMOTIDINE 40 MG: 10 INJECTION, SOLUTION INTRAVENOUS at 02:07

## 2024-07-23 RX ADMIN — IPRATROPIUM BROMIDE AND ALBUTEROL SULFATE 3 ML: .5; 3 SOLUTION RESPIRATORY (INHALATION) at 03:07

## 2024-07-23 RX ADMIN — ONDANSETRON 8 MG: 2 INJECTION INTRAMUSCULAR; INTRAVENOUS at 02:07

## 2024-07-23 RX ADMIN — METHYLPREDNISOLONE SODIUM SUCCINATE 125 MG: 125 INJECTION, POWDER, FOR SOLUTION INTRAMUSCULAR; INTRAVENOUS at 02:07

## 2024-07-23 RX ADMIN — KETOROLAC TROMETHAMINE 15 MG: 30 INJECTION, SOLUTION INTRAMUSCULAR at 02:07

## 2024-07-23 RX ADMIN — IPRATROPIUM BROMIDE AND ALBUTEROL SULFATE 3 ML: .5; 3 SOLUTION RESPIRATORY (INHALATION) at 02:07

## 2024-07-23 RX ADMIN — TETANUS TOXOID, REDUCED DIPHTHERIA TOXOID AND ACELLULAR PERTUSSIS VACCINE, ADSORBED 0.5 ML: 5; 2.5; 8; 8; 2.5 SUSPENSION INTRAMUSCULAR at 04:07

## 2024-07-23 RX ADMIN — METHOCARBAMOL 1000 MG: 500 TABLET ORAL at 04:07

## 2024-07-23 NOTE — DISCHARGE INSTRUCTIONS
Please sign up for and monitor all results on [facility] patient portal.    Please return to the ED for any new, concerning symptoms, or worsening symptoms.    Please follow-up with your primary care provider within 1 day.  An ER visit can not replace the evaluation by your primary care physician.    In the emergency department it is our goal to rule out life-threatening conditions and make sure that you are safe to be discharged home.    Many medical conditions are difficult to diagnose and may be impossible to diagnosed during a single ER visit.  Many health conditions start with nonspecific symptoms and can only be diagnosed on follow-up visits with your primary care physician or specialist.    Please be aware that all medical conditions can change and we can not predict how you will be feeling tomorrow or the next day.   If you have any worsening or new symptoms you should not hesitate to return to the emergency department for re-evaluation.  Be sure to follow up with your primary care physician for recheck and review any labs or imaging that were performed today.  It is very common for us to identify non emergent incidental findings on labs and imaging which must be followed up with your primary care physician.   If you do not have a primary care physician, you may contact the 1 listed on your discharge paperwork or you can also call the [facility] clinic appointment desk at [phone] to schedule an appointment.

## 2024-07-23 NOTE — Clinical Note
"Haydee Martin" Maxwell was seen and treated in our emergency department on 7/23/2024.  She may return to work on 07/25/2024.       If you have any questions or concerns, please don't hesitate to call.      Jen Nolan, DO"

## 2024-07-23 NOTE — ED PROVIDER NOTES
Encounter Date: 7/23/2024    SCRIBE #1 NOTE: I, Juliette Tineo, am scribing for, and in the presence of,  Jen Nolan DO. I have scribed the following portions of the note - Other sections scribed: HPI,ROS,PE,MDM.       History     Chief Complaint   Patient presents with    Fall     Pt feel yesterday after mopping floor.   Pt injured left 2nd toe (bruising noted and pt states she has screws and plates in this foot ) right knee (bruising noted) right hip, left upper arm (bruising noted)  Pt also sounds congested with wheezing noted. Pt states this is her baseline      Haydee Wily Arreola is a 53 y.o. female with Hx of arthritis, asthma, COPD, thyroid disease, chronic neck pain, arthritis, anemia,  who presents to the ED for chief complaint of  myalgias in right knee, right hip, left ankle, left foot, left wrist , and bilateral ribs that began yesterday AM after slipping and falling on ceramic floor. Patient denies LOC or head injury. Patient states no one witnessed her fall. Attempted treatment with half of NORCO pill( 10mg) this AM. Patient also has visible bruising to bilateral upper extremities, left foot, and right knee, Patient notes she has a chronic wheeze secondary to her COPD. States she usually treats it with her inhaler(trellegy). Denies chest pain, dyspnea, or other associated symptoms. Patient admits to tobacco use(2 packs q.d). Patient notes past surgical Hx of to left ankle where she had frame wear inserted.       The history is provided by the patient. No  was used.     Review of patient's allergies indicates:   Allergen Reactions    Tramadol Swelling and Anaphylaxis     Throat swelling  Throat swelling    Codeine Other (See Comments)     Unknown reaction  Unknown reaction    Ketorolac Nausea Only     Past Medical History:   Diagnosis Date    Abdominal pain 6/22/2022    Anemia     Anesthesia     PT HAS BULGING DISC/ HERNIATED  DISC C2-C7    Anxiety     Arthritis     RA    Asthma      Back problem     Blood transfusion     Cervical pain     COPD (chronic obstructive pulmonary disease)     CTS (carpal tunnel syndrome)     Edema 2022    Fatty liver 2021    GERD (gastroesophageal reflux disease)     Joint pain     shoulder    Joint pain     knee    Liver lesion 2021    Lumbar pain     Migraine     Neck pain     Panic attacks     Rotator cuff tear     RIGHT    Sciatica     Thyroid disease      Past Surgical History:   Procedure Laterality Date    APPENDECTOMY       SECTION, LOW TRANSVERSE      x 2    COLONOSCOPY Left 2021    Procedure: COLONOSCOPY;  Surgeon: Akila Li MD;  Location: [facility];  Service: Endoscopy;  Laterality: Left;  BMI 51.67  covid test [facility]   pt requested [facility] [place]  cardiology clearance scanned into media tab dated -MS    HYSTERECTOMY      left ankle surg      plates and screws    OOPHORECTOMY      TONSILLECTOMY       Family History   Problem Relation Name Age of Onset    Diabetes Sister      Breast cancer Maternal Aunt      Breast cancer Paternal Grandmother       Social History     Tobacco Use    Smoking status: Every Day     Current packs/day: 1.50     Average packs/day: 1.5 packs/day for 17.5 years (26.3 ttl pk-yrs)     Types: Cigarettes     Start date: 2007    Smokeless tobacco: Former   Substance Use Topics    Alcohol use: Yes     Alcohol/week: 70.0 standard drinks of alcohol     Types: 70 Shots of liquor per week    Drug use: No     Review of Systems   Constitutional:  Negative for fever.   HENT:  Negative for rhinorrhea and sore throat.    Eyes:  Negative for redness.   Respiratory:  Negative for shortness of breath.    Cardiovascular:  Negative for chest pain and leg swelling.   Gastrointestinal:  Negative for abdominal pain, diarrhea, nausea and vomiting.   Musculoskeletal:  Positive for myalgias (right knee, right hip, left foot, left ankle, bilateral upper extremities, bilateral ribs). Negative for back pain.   Skin:   Negative for rash.   Neurological:  Negative for syncope and headaches.   All other systems reviewed and are negative.      Patient gave consent to have physical exam performed.    Physical Exam     Initial Vitals   BP Pulse Resp Temp SpO2   07/23/24 1341 07/23/24 1341 07/23/24 1341 07/23/24 1607 07/23/24 1341   134/66 78 20 98.2 °F (36.8 °C) 96 %      MAP       --                Physical Exam    Nursing note and vitals reviewed.  Constitutional: She appears well-developed and well-nourished.   HENT:   Head: Normocephalic and atraumatic.   Right Ear: External ear normal.   Left Ear: External ear normal.   Nose: Nose normal.   Mouth/Throat: Oropharynx is clear and moist.   Eyes: Conjunctivae and EOM are normal. Pupils are equal, round, and reactive to light.   Neck: Phonation normal. Neck supple.   Normal range of motion.  Cardiovascular:  Normal rate, regular rhythm, normal heart sounds and intact distal pulses.     Exam reveals no gallop and no friction rub.       No murmur heard.  Pulmonary/Chest: Effort normal and breath sounds normal. No stridor. No respiratory distress. She has no wheezes. She has no rhonchi. She has no rales. She exhibits no tenderness.   Abdominal: Abdomen is soft. Bowel sounds are normal. She exhibits no distension. There is no abdominal tenderness. There is no rigidity, no rebound and no guarding.   Musculoskeletal:         General: No tenderness or edema. Normal range of motion.      Cervical back: Normal range of motion and neck supple.      Comments: There is tenderness and bruising to left distal toes, left ankles, right hip, right knee, . There is bruising to left upper arm, lft elbow and bilateral forearm. There is tenderness to bilateral ribs. No bony tenderness to bilateral wrist, and bilateral elbows. There is no obvious bruising to back. There is no cervical, thoracic, or lumbar tenderness.      Neurological: She is alert and oriented to person, place, and time. She has normal  strength. No cranial nerve deficit or sensory deficit. GCS score is 15. GCS eye subscore is 4. GCS verbal subscore is 5. GCS motor subscore is 6.   Skin: Skin is warm and dry. Capillary refill takes less than 2 seconds. No rash noted.   Psychiatric: She has a normal mood and affect. Her behavior is normal.         ED Course   Procedures  Labs Reviewed   POCT CMP - Abnormal       Result Value    Albumin, POC 3.9      Alkaline Phosphatase, POC 85      ALT (SGPT), POC 25      AST (SGOT), POC 32      POC BUN 15      Calcium, POC 9.6      POC Chloride 112 (*)     POC Creatinine 0.9      POC Glucose 93      POC Potassium 4.9      POC Sodium 144      Bilirubin, POC 0.6      POC TCO2 29      Protein, POC 6.7     TROPONIN ISTAT    POC Cardiac Troponin I 0.00      Sample unknown     POCT CBC    Hematocrit        Hemoglobin        RBC        WBC        MCV        MCH, POC        MCHC        RDW-CV        Platelet Count, POC        MPV       POCT CMP   POCT B-TYPE NATRIURETIC PEPTIDE (BNP)   POCT TROPONIN   POCT B-TYPE NATRIURETIC PEPTIDE (BNP)    POC B-Type Natriuretic Peptide 94.4            Imaging Results              X-Ray Hip 2 or 3 views Right with Pelvis when performed (Final result)  Result time 07/23/24 15:32:41      Final result by Alverto Pennington MD (07/23/24 15:32:41)                   Impression:      No acute displaced fracture-dislocation identified.      Electronically signed by: Alverto Pennington MD  Date:    07/23/2024  Time:    15:32               Narrative:    EXAMINATION:  XR HIP WITH PELVIS WHEN PERFORMED 2 OR 3 VIEWS RIGHT    CLINICAL HISTORY:  Pain in right hip    TECHNIQUE:  AP view of the pelvis and frog leg lateral view of the right hip were performed.    COMPARISON:  CT abdomen and pelvis 04/28/2023    FINDINGS:  Bones are well mineralized. Overall alignment is within normal limits. No displaced fracture, dislocation or destructive osseous process.  Minimal to mild DJD about the pubic symphysis, imaged  lower lumbar spine and bilateral sacroiliac and hip joints.  No subcutaneous emphysema or radiopaque foreign body.                                       X-Ray Knee 3 View Right (Final result)  Result time 07/23/24 15:31:26      Final result by Alverto Pennington MD (07/23/24 15:31:26)                   Impression:      No acute displaced fracture-dislocation identified.      Electronically signed by: Alverto Pennington MD  Date:    07/23/2024  Time:    15:31               Narrative:    EXAMINATION:  XR KNEE 3 VIEW RIGHT    CLINICAL HISTORY:  Unspecified fall, initial encounter    TECHNIQUE:  AP, lateral, and Merchant views of the right knee were performed.    COMPARISON:  Right knee series 08/13/2022    FINDINGS:  Bones are well mineralized.  No displaced fracture, dislocation or destructive osseous process.  No large suprapatellar joint effusion.  Mild to moderate tricompartmental degenerative change greatest at the medial and patellofemoral compartments, progressed since prior.  No subcutaneous emphysema or radiopaque foreign body.                                       X-Ray Chest PA And Lateral (Final result)  Result time 07/23/24 15:32:15      Final result by Fabricio Gilbert MD (07/23/24 15:32:15)                   Impression:      Linear band of increased density within the right lower lung, stable when compared to 01/21/2024.  This may be related to linear atelectasis or possibly trace fluid along the minor fissure.    No acute chest disease identified.      Electronically signed by: Fabricio Gilbert MD  Date:    07/23/2024  Time:    15:32               Narrative:    EXAMINATION:  XR CHEST PA AND LATERAL    CLINICAL HISTORY:  COPD exacerbation;    TECHNIQUE:  PA and lateral views of the chest were performed.    COMPARISON:  01/21/2024.    FINDINGS:  Heart and mediastinal structures are unchanged.  Pulmonary vasculature is within normal limits.  There is a linear band of increased density within the right lower lung  which is unchanged when compared to 01/21/2024.  This may be related to trace fluid tracking along the minor fissure.  The lungs are otherwise clear.  There is no evidence for pneumothorax or significant pleural effusions.  Bony structures appear grossly intact.                                       X-Ray Humerus 2 View Left (Final result)  Result time 07/23/24 15:27:51      Final result by Fabricio Gilbert MD (07/23/24 15:27:51)                   Impression:      No evidence for acute fracture, bone destruction, or dislocation.    Degenerative changes of the left acromioclavicular joint.      Electronically signed by: Fabricio Gilbert MD  Date:    07/23/2024  Time:    15:27               Narrative:    EXAMINATION:  XR HUMERUS 2 VIEW LEFT    CLINICAL HISTORY:  Pain in left arm    TECHNIQUE:  Two views of the left humerus were obtained.    COMPARISON:  None    FINDINGS:  The bones appear intact.  There is no evidence for acute fracture or bone destruction.  No abnormal periosteal reaction is identified.  Soft tissues are unremarkable.  There are degenerative changes of the left acromioclavicular joint.                                       X-Ray Ankle Complete Left (Final result)  Result time 07/23/24 15:27:02      Final result by Fabricio Gilbert MD (07/23/24 15:27:02)                   Impression:      Surgical changes.    No evidence for acute fracture, bone destruction, or dislocation.    Calcaneal spur at the insertion of the plantar fascia.      Electronically signed by: Fabricio Gilbert MD  Date:    07/23/2024  Time:    15:27               Narrative:    EXAMINATION:  XR ANKLE COMPLETE 3 VIEW LEFT    CLINICAL HISTORY:  Pain in left ankle and joints of left foot    TECHNIQUE:  AP, lateral and oblique views of the left ankle were performed.    COMPARISON:  None    FINDINGS:  There is a fixation plate and screws within the distal left fibula.  There is no evidence for acute fracture or bone destruction.  There is no  evidence for dislocation.  Calcaneal spur is noted at the insertion of the plantar fascia.  Soft tissues are unremarkable.                                       X-Ray Foot Complete Left (Final result)  Result time 07/23/24 15:25:35      Final result by Fabricio Gilbert MD (07/23/24 15:25:35)                   Impression:      No evidence for acute fracture, bone destruction, or dislocation.    Degenerative changes most pronounced at the 1st MTP joint.      Electronically signed by: Fabricio Gilbert MD  Date:    07/23/2024  Time:    15:25               Narrative:    EXAMINATION:  XR FOOT COMPLETE 3 VIEW LEFT    CLINICAL HISTORY:  .  Pain in left toe(s)    TECHNIQUE:  AP, lateral and oblique views of the left foot were performed.    COMPARISON:  08/13/2022.    FINDINGS:  The bones are intact.  There is no evidence for acute fracture or bone destruction.  There is no evidence for dislocation.  No bony erosions are identified.  There are mild degenerative changes which appear most pronounced at the 1st metatarsophalangeal joint.  Calcaneal spur is present at the insertion of the plantar fascia.  There is a fixation plate within the distal fibula.  No radiopaque soft tissue foreign bodies are evident.                                       Medications   methocarbamoL tablet 1,000 mg (has no administration in time range)   albuterol-ipratropium 2.5 mg-0.5 mg/3 mL nebulizer solution 3 mL (3 mLs Nebulization Given 7/23/24 1500)   methylPREDNISolone sodium succinate injection 125 mg (125 mg Intravenous Given 7/23/24 1429)   ketorolac injection 15 mg (15 mg Intravenous Given 7/23/24 1429)   ondansetron injection 8 mg (8 mg Intravenous Given 7/23/24 1429)   famotidine (PF) injection 40 mg (40 mg Intravenous Given 7/23/24 1429)     Medical Decision Making  Amount and/or Complexity of Data Reviewed  Labs: ordered. Decision-making details documented in ED Course.     Details: See HPI   Radiology: ordered. Decision-making details  documented in ED Course.     Details: See HPI     Risk  OTC drugs.  Prescription drug management.    Medical Decision Making:    This is an evaluation of a 53 y.o. female that presents to the Emergency Department for   Chief Complaint   Patient presents with    Fall     Pt feel yesterday after mopping floor.   Pt injured left 2nd toe (bruising noted and pt states she has screws and plates in this foot ) right knee (bruising noted) right hip, left upper arm (bruising noted)  Pt also sounds congested with wheezing noted. Pt states this is her baseline        Independent historian: (parent/ EMS/ spouse/ etc) none    The patient is a non-toxic and well appearing patient. On physical exam, patient appears well hydrated with moist mucus membranes. Breath sounds are clear and equal bilaterally with no adventitious breath sounds, tachypnea or respiratory distress. Regular rate and rhythm. No murmurs. Abdomen soft and non tender. Patient is tolerating PO without difficulty. Physical exam otherwise as above.     I have reviewed vital signs and nursing notes.   Vital Signs Are Reassuring.     Based on the patient's symptoms, I am considering and evaluating for the following differential diagnoses: COPD, COPD exacerbation, STEMI, NSTEMI, cardiac arrhythmia, sprain, strain, contusion, dislocation, fracture      Consider hospitalization for:  Trauma    Patient is agreeable to transfer and admission to [facility] if necessary    ED Course:Treatment in the ED included Physical Exam and medications given in ED  Medications   methocarbamoL tablet 1,000 mg (has no administration in time range)   albuterol-ipratropium 2.5 mg-0.5 mg/3 mL nebulizer solution 3 mL (3 mLs Nebulization Given 7/23/24 1500)   methylPREDNISolone sodium succinate injection 125 mg (125 mg Intravenous Given 7/23/24 1429)   ketorolac injection 15 mg (15 mg Intravenous Given 7/23/24 1429)   ondansetron injection 8 mg (8 mg Intravenous Given 7/23/24 1429)    famotidine (PF) injection 40 mg (40 mg Intravenous Given 7/23/24 8378)   .   Patient reports feeling better after treatment in the ER.       External Data/Documents Reviewed: Previous medical records and vital signs reviewed, see HPI and Physical exam.   Labs: ordered and reviewed.  Troponin within normal limits at 0.00.  Radiology: ordered as indicated and reviewed.   ECG/medicine tests: ordered and independent interpretation performed by Dr. Jen Nolan DO. Decision-making details documented in ED Course.   Cardiac monitor placed for tachypnea. Monitor shows Normal Sinus Rhythm with  rate of 77. Interpreted by Dr. Jen Nolan DO.  Patient presents with chest pain for greater than 24 hours.  Troponin is negative.  No STEMI on EKG and no acute issues on chest x-ray. Chest pain unlikely to be cardiac in origin.  I recommend follow up with Cardiology in 1 day for further evaluation of chest pain. Discussed need to return to the ED if chest pain worsens or does not resolve.      Risk  Diagnosis or treatment significantly limited by the following social determinants of health: Body mass index is 53.96 kg/m².     In shared decision making with the patient, we discussed treatment, prescriptions, labs, and imaging results.    Discharge home with   ED Prescriptions       Medication Sig Dispense Start Date End Date Auth. Provider    methocarbamoL (ROBAXIN) 500 MG Tab Take 2 tablets (1,000 mg total) by mouth 3 (three) times daily. for 5 days 30 tablet 7/23/2024 7/28/2024 Jen Nolan DO    acetaminophen (TYLENOL) 500 MG tablet Take 1 tablet (500 mg total) by mouth every 6 (six) hours as needed for Pain (As needed for mild-to-moderate pain). 30 tablet 7/23/2024 -- Jen Nolan DO    diclofenac sodium (VOLTAREN) 1 % Gel Apply 2 g topically 4 (four) times daily as needed (Apply to painful area 4 times a day as needed for pain). 200 g 7/23/2024 -- Jen Nolan DO    predniSONE (DELTASONE) 20 MG tablet Take 2 tablets (40 mg  total) by mouth once daily. for 5 days 10 tablet 7/23/2024 7/28/2024 Jen Nolan DO    albuterol (PROVENTIL/VENTOLIN HFA) 90 mcg/actuation inhaler Inhale 2 puffs into the lungs every 6 (six) hours as needed for Wheezing. Use with spacer  Dispense with 1 spacer 18 g 7/23/2024 7/23/2025 Jen Nolan DO    albuterol sulfate 2.5 mg/0.5 mL Nebu Take 2.5 mg by nebulization every 4 (four) hours as needed (As needed for shortness of breath). Rescue 30 each 7/23/2024 7/23/2025 Jen Nolan DO          Fill and take prescriptions as directed.  Return to the ED if symptoms worsen or do not resolve.   Answered questions and discussed discharge plan.    Patient reports resolution of wheezing and is ready for discharge.  Follow up with PCP/specialist in 1 day      At time of discharge patient is awake alert oriented x4 speaking clearly in full sentences and moving all 4 extremities.     The following labs and imaging were reviewed:        Admission on 07/23/2024   Component Date Value Ref Range Status    Albumin, POC 07/23/2024 3.9  3.3 - 5.5 g/dL Final    Alkaline Phosphatase, POC 07/23/2024 85  42 - 141 U/L Final    ALT (SGPT), POC 07/23/2024 25  10 - 47 U/L Final    AST (SGOT), POC 07/23/2024 32  11 - 38 U/L Final    POC BUN 07/23/2024 15  7 - 22 mg/dL Final    Calcium, POC 07/23/2024 9.6  8.0 - 10.3 mg/dL Final    POC Chloride 07/23/2024 112 (H)  98 - 108 mmol/L Final    POC Creatinine 07/23/2024 0.9  0.6 - 1.2 mg/dL Final    POC Glucose 07/23/2024 93  73 - 118 mg/dL Final    POC Potassium 07/23/2024 4.9  3.6 - 5.1 mmol/L Final    POC Sodium 07/23/2024 144  128 - 145 mmol/L Final    Bilirubin, POC 07/23/2024 0.6  0.2 - 1.6 mg/dL Final    POC TCO2 07/23/2024 29  18 - 33 mmol/L Final    Protein, POC 07/23/2024 6.7  6.4 - 8.1 g/dL Final    POC Cardiac Troponin I 07/23/2024 0.00  0.00 - 0.08 ng/mL Final    Sample 07/23/2024 unknown   Final    Comment: A single negative troponin is insufficient to rule out myocardial  infarction.  The use of a serial sampling protocol is recommended practice. Correlate results with reference intervals established for methodology used. Point of care and core laboratory   troponin results are not interchangeable.      POC B-Type Natriuretic Peptide 07/23/2024 94.4  0.0 - 100.0 pg/mL Final        Imaging Results              X-Ray Hip 2 or 3 views Right with Pelvis when performed (Final result)  Result time 07/23/24 15:32:41      Final result by Alverto Pennington MD (07/23/24 15:32:41)                   Impression:      No acute displaced fracture-dislocation identified.      Electronically signed by: Alverto Pennington MD  Date:    07/23/2024  Time:    15:32               Narrative:    EXAMINATION:  XR HIP WITH PELVIS WHEN PERFORMED 2 OR 3 VIEWS RIGHT    CLINICAL HISTORY:  Pain in right hip    TECHNIQUE:  AP view of the pelvis and frog leg lateral view of the right hip were performed.    COMPARISON:  CT abdomen and pelvis 04/28/2023    FINDINGS:  Bones are well mineralized. Overall alignment is within normal limits. No displaced fracture, dislocation or destructive osseous process.  Minimal to mild DJD about the pubic symphysis, imaged lower lumbar spine and bilateral sacroiliac and hip joints.  No subcutaneous emphysema or radiopaque foreign body.                                       X-Ray Knee 3 View Right (Final result)  Result time 07/23/24 15:31:26      Final result by Alverto Pennington MD (07/23/24 15:31:26)                   Impression:      No acute displaced fracture-dislocation identified.      Electronically signed by: Alverto Pennington MD  Date:    07/23/2024  Time:    15:31               Narrative:    EXAMINATION:  XR KNEE 3 VIEW RIGHT    CLINICAL HISTORY:  Unspecified fall, initial encounter    TECHNIQUE:  AP, lateral, and Merchant views of the right knee were performed.    COMPARISON:  Right knee series 08/13/2022    FINDINGS:  Bones are well mineralized.  No displaced fracture, dislocation or  destructive osseous process.  No large suprapatellar joint effusion.  Mild to moderate tricompartmental degenerative change greatest at the medial and patellofemoral compartments, progressed since prior.  No subcutaneous emphysema or radiopaque foreign body.                                       X-Ray Chest PA And Lateral (Final result)  Result time 07/23/24 15:32:15      Final result by Fabricio Gilbert MD (07/23/24 15:32:15)                   Impression:      Linear band of increased density within the right lower lung, stable when compared to 01/21/2024.  This may be related to linear atelectasis or possibly trace fluid along the minor fissure.    No acute chest disease identified.      Electronically signed by: Fabricio Gilbert MD  Date:    07/23/2024  Time:    15:32               Narrative:    EXAMINATION:  XR CHEST PA AND LATERAL    CLINICAL HISTORY:  COPD exacerbation;    TECHNIQUE:  PA and lateral views of the chest were performed.    COMPARISON:  01/21/2024.    FINDINGS:  Heart and mediastinal structures are unchanged.  Pulmonary vasculature is within normal limits.  There is a linear band of increased density within the right lower lung which is unchanged when compared to 01/21/2024.  This may be related to trace fluid tracking along the minor fissure.  The lungs are otherwise clear.  There is no evidence for pneumothorax or significant pleural effusions.  Bony structures appear grossly intact.                                       X-Ray Humerus 2 View Left (Final result)  Result time 07/23/24 15:27:51      Final result by Fabricio Gilbert MD (07/23/24 15:27:51)                   Impression:      No evidence for acute fracture, bone destruction, or dislocation.    Degenerative changes of the left acromioclavicular joint.      Electronically signed by: Fabricio Gilbert MD  Date:    07/23/2024  Time:    15:27               Narrative:    EXAMINATION:  XR HUMERUS 2 VIEW LEFT    CLINICAL HISTORY:  Pain in left  arm    TECHNIQUE:  Two views of the left humerus were obtained.    COMPARISON:  None    FINDINGS:  The bones appear intact.  There is no evidence for acute fracture or bone destruction.  No abnormal periosteal reaction is identified.  Soft tissues are unremarkable.  There are degenerative changes of the left acromioclavicular joint.                                       X-Ray Ankle Complete Left (Final result)  Result time 07/23/24 15:27:02      Final result by Fabricio Gilbert MD (07/23/24 15:27:02)                   Impression:      Surgical changes.    No evidence for acute fracture, bone destruction, or dislocation.    Calcaneal spur at the insertion of the plantar fascia.      Electronically signed by: Fabricio Gilbert MD  Date:    07/23/2024  Time:    15:27               Narrative:    EXAMINATION:  XR ANKLE COMPLETE 3 VIEW LEFT    CLINICAL HISTORY:  Pain in left ankle and joints of left foot    TECHNIQUE:  AP, lateral and oblique views of the left ankle were performed.    COMPARISON:  None    FINDINGS:  There is a fixation plate and screws within the distal left fibula.  There is no evidence for acute fracture or bone destruction.  There is no evidence for dislocation.  Calcaneal spur is noted at the insertion of the plantar fascia.  Soft tissues are unremarkable.                                       X-Ray Foot Complete Left (Final result)  Result time 07/23/24 15:25:35      Final result by Fabricio Gilbert MD (07/23/24 15:25:35)                   Impression:      No evidence for acute fracture, bone destruction, or dislocation.    Degenerative changes most pronounced at the 1st MTP joint.      Electronically signed by: Fabricio Gilbert MD  Date:    07/23/2024  Time:    15:25               Narrative:    EXAMINATION:  XR FOOT COMPLETE 3 VIEW LEFT    CLINICAL HISTORY:  .  Pain in left toe(s)    TECHNIQUE:  AP, lateral and oblique views of the left foot were performed.    COMPARISON:  08/13/2022.    FINDINGS:  The  bones are intact.  There is no evidence for acute fracture or bone destruction.  There is no evidence for dislocation.  No bony erosions are identified.  There are mild degenerative changes which appear most pronounced at the 1st metatarsophalangeal joint.  Calcaneal spur is present at the insertion of the plantar fascia.  There is a fixation plate within the distal fibula.  No radiopaque soft tissue foreign bodies are evident.                                            Scribe Attestation:   Scribe #1: I performed the above scribed service and the documentation accurately describes the services I performed. I attest to the accuracy of the note.        ED Course as of 07/23/24 1630   Tue Jul 23, 2024   1623 Patient reports she feels better and her wheezing has resolved  [JL]      ED Course User Index  [JL] Juliette Martines, Dr. Jen Nolan, personally performed the services described in this documentation. This document was produced by a scribe under my direction and in my presence. All medical record entries made by the scribe were at my direction and in my presence.  I have reviewed the chart and agree that the record reflects my personal performance and is accurate and complete. Jen Nolan DO.     07/23/2024 4:30 PM    Clinical Impression:  Final diagnoses:  [M79.675] Toe pain, left - Degenerative changes at the 1st MTP joint  [M25.572] Ankle pain, left  [M79.602] Left arm pain  [W19.XXXA] Fall  [M25.561] Acute pain of right knee  [M25.551] Acute hip pain, right  [J44.1] COPD exacerbation (Primary)  [M79.18] Musculoskeletal pain  [T14.8XXA] Contusion of soft tissue          ED Disposition Condition    Discharge Stable          ED Prescriptions       Medication Sig Dispense Start Date End Date Auth. Provider    methocarbamoL (ROBAXIN) 500 MG Tab Take 2 tablets (1,000 mg total) by mouth 3 (three) times daily. for 5 days 30 tablet 7/23/2024 7/28/2024 Jen Nolan DO     acetaminophen (TYLENOL) 500 MG tablet Take 1 tablet (500 mg total) by mouth every 6 (six) hours as needed for Pain (As needed for mild-to-moderate pain). 30 tablet 7/23/2024 -- Jen Nolan DO    diclofenac sodium (VOLTAREN) 1 % Gel Apply 2 g topically 4 (four) times daily as needed (Apply to painful area 4 times a day as needed for pain). 200 g 7/23/2024 -- Jen Nolan DO    predniSONE (DELTASONE) 20 MG tablet Take 2 tablets (40 mg total) by mouth once daily. for 5 days 10 tablet 7/23/2024 7/28/2024 Jen Nolan DO    albuterol (PROVENTIL/VENTOLIN HFA) 90 mcg/actuation inhaler Inhale 2 puffs into the lungs every 6 (six) hours as needed for Wheezing. Use with spacer  Dispense with 1 spacer 18 g 7/23/2024 7/23/2025 Jen Nolan DO    albuterol sulfate 2.5 mg/0.5 mL Nebu Take 2.5 mg by nebulization every 4 (four) hours as needed (As needed for shortness of breath). Rescue 30 each 7/23/2024 7/23/2025 Jen Nolan DO          Follow-up Information    None          Jen Nolan DO  07/23/24 7412

## 2024-10-11 ENCOUNTER — OFFICE VISIT (OUTPATIENT)
Dept: OBSTETRICS AND GYNECOLOGY | Facility: CLINIC | Age: 54
End: 2024-10-11
Payer: MEDICAID

## 2024-10-11 VITALS — SYSTOLIC BLOOD PRESSURE: 130 MMHG | DIASTOLIC BLOOD PRESSURE: 84 MMHG | WEIGHT: 293 LBS | BODY MASS INDEX: 54.84 KG/M2

## 2024-10-11 DIAGNOSIS — Z12.39 SCREENING BREAST EXAMINATION: ICD-10-CM

## 2024-10-11 DIAGNOSIS — I10 HYPERTENSION, UNSPECIFIED TYPE: ICD-10-CM

## 2024-10-11 DIAGNOSIS — Z12.4 ENCOUNTER FOR PAPANICOLAOU SMEAR FOR CERVICAL CANCER SCREENING: ICD-10-CM

## 2024-10-11 DIAGNOSIS — R30.0 DYSURIA: ICD-10-CM

## 2024-10-11 DIAGNOSIS — Z11.51 SCREENING FOR HUMAN PAPILLOMAVIRUS (HPV): ICD-10-CM

## 2024-10-11 DIAGNOSIS — Z11.3 ENCOUNTER FOR SCREENING EXAMINATION FOR SEXUALLY TRANSMITTED DISEASE: ICD-10-CM

## 2024-10-11 DIAGNOSIS — E78.5 HYPERLIPIDEMIA, UNSPECIFIED HYPERLIPIDEMIA TYPE: ICD-10-CM

## 2024-10-11 DIAGNOSIS — Z12.31 ENCOUNTER FOR SCREENING MAMMOGRAM FOR BREAST CANCER: ICD-10-CM

## 2024-10-11 DIAGNOSIS — J44.1 CHRONIC OBSTRUCTIVE PULMONARY DISEASE WITH ACUTE EXACERBATION: ICD-10-CM

## 2024-10-11 DIAGNOSIS — Z90.710 STATUS POST HYSTERECTOMY: ICD-10-CM

## 2024-10-11 DIAGNOSIS — N76.0 ACUTE VAGINITIS: ICD-10-CM

## 2024-10-11 DIAGNOSIS — Z01.419 WOMEN'S ANNUAL ROUTINE GYNECOLOGICAL EXAMINATION: Primary | ICD-10-CM

## 2024-10-11 DIAGNOSIS — N95.1 MENOPAUSAL STATE: ICD-10-CM

## 2024-10-11 LAB
BILIRUB SERPL-MCNC: ABNORMAL MG/DL
BLOOD URINE, POC: ABNORMAL
CLARITY, POC UA: ABNORMAL
COLOR, POC UA: YELLOW
GLUCOSE UR QL STRIP: ABNORMAL
KETONES UR QL STRIP: ABNORMAL
LEUKOCYTE ESTERASE URINE, POC: ABNORMAL
NITRITE, POC UA: ABNORMAL
PH, POC UA: 5
PROTEIN, POC: ABNORMAL
SPECIFIC GRAVITY, POC UA: 1010
UROBILINOGEN, POC UA: ABNORMAL

## 2024-10-11 PROCEDURE — 99214 OFFICE O/P EST MOD 30 MIN: CPT | Mod: PBBFAC | Performed by: PHYSICIAN ASSISTANT

## 2024-10-11 PROCEDURE — 87591 N.GONORRHOEAE DNA AMP PROB: CPT | Performed by: PHYSICIAN ASSISTANT

## 2024-10-11 PROCEDURE — 99999 PR PBB SHADOW E&M-EST. PATIENT-LVL IV: CPT | Mod: PBBFAC,,, | Performed by: PHYSICIAN ASSISTANT

## 2024-10-11 PROCEDURE — 0352U VAGINOSIS SCREEN BY DNA PROBE: CPT | Performed by: PHYSICIAN ASSISTANT

## 2024-10-11 PROCEDURE — 87086 URINE CULTURE/COLONY COUNT: CPT | Performed by: PHYSICIAN ASSISTANT

## 2024-10-11 PROCEDURE — 87491 CHLMYD TRACH DNA AMP PROBE: CPT | Performed by: PHYSICIAN ASSISTANT

## 2024-10-11 RX ORDER — SULFAMETHOXAZOLE AND TRIMETHOPRIM 800; 160 MG/1; MG/1
1 TABLET ORAL 2 TIMES DAILY
Qty: 6 TABLET | Refills: 0 | Status: SHIPPED | OUTPATIENT
Start: 2024-10-11 | End: 2024-10-14

## 2024-10-11 RX ORDER — METRONIDAZOLE 7.5 MG/G
1 GEL VAGINAL DAILY
Qty: 70 G | Refills: 0 | Status: SHIPPED | OUTPATIENT
Start: 2024-10-11 | End: 2024-10-16

## 2024-10-11 RX ORDER — ESTRADIOL 0.5 MG/1
0.5 TABLET ORAL DAILY
Qty: 30 TABLET | Refills: 11 | Status: SHIPPED | OUTPATIENT
Start: 2024-10-11 | End: 2025-10-11

## 2024-10-11 RX ORDER — CLOTRIMAZOLE AND BETAMETHASONE DIPROPIONATE 10; .64 MG/G; MG/G
CREAM TOPICAL 2 TIMES DAILY
Qty: 15 G | Refills: 0 | Status: SHIPPED | OUTPATIENT
Start: 2024-10-11

## 2024-10-11 NOTE — PROGRESS NOTES
HISTORY OF PRESENT ILLNESS:    Haydee Arreola is a 53 y.o. female,   presents today for her routine visit. She is s/p hysterectomy. rEPORTS GYN complaints. REPORTS VAGINAL BURNING, ODOR, AND DYSURIA. REPORTS vasomotor symptoms, or vaginal dryness. The patient participates in regular exercise: NO.  The patient does not smoke.  The patient wears seatbelts.   Pt denies any domestic violence.  YES- Tattoos/blood transfusions.     SCREENING:  PAP:   MAMMOGRAM:  , TC Score: 15.7 %   COLONOSCOPY: DUE   DEXA: N/A    GYN FH:  Breast cancer: PGMA, MAUNT   Colon cancer: none  Ovarian cancer: none  Endometrial cancer: none    Past Medical History:   Diagnosis Date    Abdominal pain 2022    Anemia     Anesthesia     PT HAS BULGING DISC/ HERNIATED  DISC C2-C7    Anxiety     Arthritis     RA    Asthma     Back problem     Blood transfusion     Cervical pain     COPD (chronic obstructive pulmonary disease)     CTS (carpal tunnel syndrome)     Edema 2022    Fatty liver 2021    GERD (gastroesophageal reflux disease)     Joint pain     shoulder    Joint pain     knee    Liver lesion 2021    Lumbar pain     Migraine     Neck pain     Panic attacks     Rotator cuff tear     RIGHT    Sciatica     Thyroid disease        Past Surgical History:   Procedure Laterality Date    APPENDECTOMY       SECTION, LOW TRANSVERSE      x 2    COLONOSCOPY Left 2021    Procedure: COLONOSCOPY;  Surgeon: Akila Li MD;  Location: [facility];  Service: Endoscopy;  Laterality: Left;  BMI 51.67  covid test Stacy   pt requested [facility]  cardiology clearance scanned into media tab dated -MS    HYSTERECTOMY      left ankle surg      plates and screws    OOPHORECTOMY      TONSILLECTOMY         MEDICATIONS AND ALLERGIES:      Current Outpatient Medications:     acetaminophen (TYLENOL) 500 MG tablet, Take 1 tablet (500 mg total) by mouth every 6 (six) hours as needed for Pain (As needed for  mild-to-moderate pain)., Disp: 30 tablet, Rfl: 0    ADVAIR DISKUS 250-50 mcg/dose diskus inhaler, Inhale 1 puff into the lungs 2 (two) times daily., Disp: , Rfl:     albuterol (ACCUNEB) 0.63 mg/3 mL Nebu, Take 0.63 mg by nebulization every 6 (six) hours as needed., Disp: , Rfl:     albuterol (PROVENTIL/VENTOLIN HFA) 90 mcg/actuation inhaler, Inhale 2 puffs into the lungs every 6 (six) hours as needed for Wheezing. Use with spacer Dispense with 1 spacer, Disp: 18 g, Rfl: 0    albuterol sulfate 2.5 mg/0.5 mL Nebu, Take 2.5 mg by nebulization every 4 (four) hours as needed (As needed for shortness of breath). Rescue, Disp: 30 each, Rfl: 0    albuterol-ipratropium (DUO-NEB) 2.5 mg-0.5 mg/3 mL nebulizer solution, Inhale 3 mLs into the lungs., Disp: , Rfl:     albuterol-ipratropium (DUO-NEB) 2.5 mg-0.5 mg/3 mL nebulizer solution, SMARTSIG:3 Milliliter(s) Via Nebulizer Every 6 Hours, Disp: , Rfl:     ALPRAZolam (XANAX) 1 MG tablet, Take 1 mg by mouth 2 (two) times daily., Disp: , Rfl:     alprazolam (XANAX) 2 MG Tab, Take 2 mg by mouth 2 (two) times daily., Disp: , Rfl: 0    amLODIPine (NORVASC) 10 MG tablet, Take 10 mg by mouth once daily., Disp: , Rfl: 2    benzonatate (TESSALON) 200 MG capsule, TAKE 1 CAPSULE BY MOUTH THREE TIMES A DAY AS NEEDED FOR COUGH, Disp: , Rfl: 0    cholecalciferol, vitamin D3, 1,250 mcg (50,000 unit) capsule, Take 1 capsule by mouth every 7 days., Disp: , Rfl:     clotrimazole-betamethasone 1-0.05% (LOTRISONE) cream, Apply topically 2 (two) times daily., Disp: 15 g, Rfl: 0    cyclobenzaprine (FLEXERIL) 10 MG tablet, , Disp: , Rfl:     diclofenac sodium (VOLTAREN) 1 % Gel, Apply 2 g topically 4 (four) times daily as needed (Apply to painful area 4 times a day as needed for pain)., Disp: 200 g, Rfl: 0    docusate sodium (COLACE) 100 MG capsule, Take 1 capsule (100 mg total) by mouth 2 (two) times daily as needed for Constipation. (Patient not taking: Reported on 6/22/2022), Disp: 30 capsule, Rfl:  0    ENTRESTO 24-26 mg per tablet, Take 1 tablet by mouth 2 (two) times daily., Disp: , Rfl:     estradioL (ESTRACE) 0.5 MG tablet, Take 1 tablet (0.5 mg total) by mouth once daily., Disp: 30 tablet, Rfl: 11    famotidine (PEPCID) 20 MG tablet, Take 1 tablet (20 mg total) by mouth 2 (two) times daily. (Patient not taking: Reported on 2023), Disp: 20 tablet, Rfl: 0    fluconazole (DIFLUCAN) 150 MG Tab, Take 1 tablet (150 mg total) by mouth as needed (vaginal yeast infection). Take one pill weekly as needed for yeast infection., Disp: 6 tablet, Rfl: 0    furosemide (LASIX) 40 MG tablet, Take 60 mg by mouth., Disp: , Rfl:     hydroCHLOROthiazide (HYDRODIURIL) 12.5 MG Tab, Take 12.5 mg by mouth once daily., Disp: , Rfl:     hydroCHLOROthiazide (HYDRODIURIL) 25 MG tablet, Take 25 mg by mouth., Disp: , Rfl:     hydroCHLOROthiazide (HYDRODIURIL) 25 MG tablet, Take 25 mg by mouth once daily., Disp: , Rfl:     HYDROcodone-acetaminophen (NORCO)  mg per tablet, Take 1 tablet by mouth., Disp: , Rfl:     ibuprofen (ADVIL,MOTRIN) 600 MG tablet, Take 1 tablet (600 mg total) by mouth every 6 (six) hours., Disp: 20 tablet, Rfl: 0    LIDOcaine (LIDODERM) 5 %, SMARTSI Patch(s) Topical, Disp: , Rfl:     loratadine 10 mg Cap, loratadine Take No date recorded No form recorded No frequency recorded No route recorded No set duration recorded No set duration amount recorded active No dosage strength recorded No dosage strength units of measure recorded, Disp: , Rfl:     meloxicam (MOBIC) 15 MG tablet, Take 15 mg by mouth daily as needed., Disp: , Rfl:     metoprolol succinate (TOPROL-XL) 25 MG 24 hr tablet, Take 25 mg by mouth once daily., Disp: , Rfl:     metroNIDAZOLE (METROGEL VAGINAL) 0.75 % (37.5mg/5 gram) vaginal gel, Place 1 applicator vaginally once daily. for 5 days, Disp: 70 g, Rfl: 0    montelukast (SINGULAIR) 10 mg tablet, montelukast Take No date recorded No form recorded No frequency recorded No route recorded  No set duration recorded No set duration amount recorded active No dosage strength recorded No dosage strength units of measure recorded, Disp: , Rfl:     neomycin-polymyxin-hydrocortisone (CORTISPORIN) otic solution, INT 3 GTS IN AU QID FOR 10 DAYS, Disp: , Rfl:     nitroGLYCERIN (NITROSTAT) 0.4 MG SL tablet, DISSOLVE 1 TABLET UNDER THE TONGUE EVERY 5 MINS AS NEEDED FOR CHEST PAIN FOR UP TO 3 DOSES. CALL 911 IF NO RELIEF, Disp: , Rfl:     nystatin (MYCOSTATIN) 100,000 unit/mL suspension, nystatin Take No date recorded No form recorded No frequency recorded No route recorded No set duration recorded No set duration amount recorded active No dosage strength recorded No dosage strength units of measure recorded, Disp: , Rfl:     nystatin-triamcinolone (MYCOLOG II) cream, Apply to affected area 2 times daily (Patient not taking: No sig reported), Disp: 30 g, Rfl: 1    omeprazole (PRILOSEC) 40 MG capsule, Take 40 mg by mouth once daily., Disp: , Rfl: 5    potassium chloride (KLOR-CON) 10 MEQ TbSR, Take 10 mEq by mouth once daily., Disp: , Rfl:     potassium chloride (MICRO-K) 10 MEQ CpSR, potassium chloride Take No date recorded No form recorded No frequency recorded No route recorded No set duration recorded No set duration amount recorded active No dosage strength recorded No dosage strength units of measure recorded, Disp: , Rfl:     potassium chloride (MICRO-K) 10 MEQ CpSR, Take 10 mEq by mouth., Disp: , Rfl:     promethazine (PHENERGAN) 25 MG suppository, Place 1 suppository (25 mg total) rectally every 6 (six) hours as needed (Use if  nausea not controlled with oral medication). (Patient not taking: Reported on 11/16/2023), Disp: 10 suppository, Rfl: 0    rosuvastatin (CRESTOR) 10 MG tablet, Take 10 mg by mouth once daily., Disp: , Rfl:     sertraline (ZOLOFT) 100 MG tablet, Take 100 mg by mouth once daily., Disp: , Rfl:     sertraline (ZOLOFT) 25 MG tablet, Take 100 mg by mouth once daily. , Disp: , Rfl:      spironolactone (ALDACTONE) 25 MG tablet, spironolactone Take No date recorded No form recorded No frequency recorded No route recorded No set duration recorded No set duration amount recorded active No dosage strength recorded No dosage strength units of measure recorded, Disp: , Rfl:     sulfamethoxazole-trimethoprim 800-160mg (BACTRIM DS) 800-160 mg Tab, Take 1 tablet by mouth 2 (two) times daily. for 3 days, Disp: 6 tablet, Rfl: 0    terconazole (TERAZOL 3) 0.8 % vaginal cream, INSERT 1 APPLICATORFUL VAGINALLY AT BEDTIME, Disp: 20 g, Rfl: 1    tiotropium (SPIRIVA) 18 mcg inhalation capsule, Inhale 18 mcg into the lungs once daily. Controller, Disp: , Rfl:   No current facility-administered medications for this visit.    Review of patient's allergies indicates:   Allergen Reactions    Tramadol Swelling and Anaphylaxis     Throat swelling  Throat swelling    Codeine Other (See Comments)     Unknown reaction  Unknown reaction    Ketorolac Nausea Only       Family History   Problem Relation Name Age of Onset    Diabetes Sister      Breast cancer Maternal Aunt      Breast cancer Paternal Grandmother         Social History     Socioeconomic History    Marital status:    Tobacco Use    Smoking status: Every Day     Current packs/day: 1.50     Average packs/day: 1.5 packs/day for 17.7 years (26.6 ttl pk-yrs)     Types: Cigarettes     Start date: 1/22/2007    Smokeless tobacco: Former   Substance and Sexual Activity    Alcohol use: Yes     Alcohol/week: 70.0 standard drinks of alcohol     Types: 70 Shots of liquor per week    Drug use: No    Sexual activity: Yes     Partners: Male     Social Drivers of Health     Financial Resource Strain: Low Risk  (1/22/2024)    Overall Financial Resource Strain (CARDIA)     Difficulty of Paying Living Expenses: Not hard at all   Food Insecurity: No Food Insecurity (1/22/2024)    Hunger Vital Sign     Worried About Running Out of Food in the Last Year: Never true     Ran Out of  Food in the Last Year: Never true   Transportation Needs: No Transportation Needs (1/22/2024)    PRAPARE - Transportation     Lack of Transportation (Medical): No     Lack of Transportation (Non-Medical): No   Physical Activity: Inactive (1/22/2024)    Exercise Vital Sign     Days of Exercise per Week: 0 days     Minutes of Exercise per Session: 0 min   Stress: Stress Concern Present (1/22/2024)    [employer] - Occupational Stress Questionnaire     Feeling of Stress : Rather much   Housing Stability: Low Risk  (1/22/2024)    Housing Stability Vital Sign     Unable to Pay for Housing in the Last Year: No     Number of Places Lived in the Last Year: 1     Unstable Housing in the Last Year: No       COMPREHENSIVE GYN HISTORY:    PAP History: Denies abnormal Paps .  Infection History: Denies STDs. Denies PID.  Benign History: Denies uterine fibroids. Denies ovarian cysts. Denies endometriosis. Denies other conditions.  Cancer History: Denies cervical cancer. Denies uterine cancer or hyperplasia. Denies ovarian cancer. Denies vulvar cancer or pre-cancer. Denies vaginal cancer or pre-cancer. Denies breast cancer. Denies colon cancer.  Menstrual History: Denies menses.     ROS:  GENERAL: Feeling well overall. No weight changes. No swelling. No fatigue. No fever.  CARDIOVASCULAR: No chest pain. No shortness of breath. No leg cramps.   NEUROLOGICAL: No headaches. No vision changes.  BREASTS: No pain. No lumps. No discharge.  ABDOMEN: No pain. No nausea. No vomiting. No diarrhea. No constipation.  REPRODUCTIVE: No abnormal bleeding. See HPI  VULVA: No pain. No lesions. No itching.  VAGINA: No relaxation. No itching. No odor. No discharge. No lesions.  URINARY: No incontinence. No nocturia. No frequency. No dysuria.  PSYCHIATRIC: Denies depression.    /84   Wt 136 kg (299 lb 13.2 oz)   LMP 10/23/2013   BMI 54.84 kg/m²     PE:  APPEARANCE: Well nourished, well developed, in no acute  distress.  AFFECT: WNL, alert and oriented x 3.  SKIN: No acne or hirsutism.  NECK: Neck symmetric without masses or thyromegaly.  NODES: No inguinal, cervical, axillary or femoral lymph node enlargement.  CHEST: Good respiratory effort.   ABDOMEN: Soft. No tenderness or masses. No hepatosplenomegaly. No hernias.  BREASTS: Symmetrical, no skin changes or visible lesions. No palpable masses, nipple discharge bilaterally.  PELVIC: ATROPHIC EXTERNAL FEMALE GENITALIA without lesions. Normal hair distribution. Adequate perineal body, normal urethral meatus. VAGINA DRY without lesions or discharge. No significant cystocele or rectocele. Bimanual exam shows UTERUS to be SURGICALLY ABSENT. Adnexa without masses or tenderness.  EXTREMITIES: No edema.    DIAGNOSIS:  1. Women's annual routine gynecological examination        2. Screening breast examination        3. Acute vaginitis  Vaginosis Screen by DNA Probe    metroNIDAZOLE (METROGEL VAGINAL) 0.75 % (37.5mg/5 gram) vaginal gel    C. trachomatis/N. gonorrhoeae by AMP DNA    clotrimazole-betamethasone 1-0.05% (LOTRISONE) cream    CANCELED: C. trachomatis/N. gonorrhoeae by AMP DNA      4. Dysuria  POCT urine dipstick without microscope    Urine culture    sulfamethoxazole-trimethoprim 800-160mg (BACTRIM DS) 800-160 mg Tab      5. Hypertension, unspecified type        6. Hyperlipidemia, unspecified hyperlipidemia type        7. Chronic obstructive pulmonary disease with acute exacerbation        8. Status post hysterectomy        9. Menopausal state        10. Encounter for screening mammogram for breast cancer  Mammo Digital Screening Bilat w/ Lino      11. Encounter for Papanicolaou smear for cervical cancer screening  Liquid-Based Pap Smear, Screening      12. Screening for human papillomavirus (HPV)  HPV High Risk Genotypes, PCR      13. Encounter for screening examination for sexually transmitted disease  Hepatitis B Surface Antigen    Hepatitis C Antibody    HIV 1/2  Ag/Ab (4th Gen)    Vaginosis Screen by DNA Probe    Treponema Pallidium Antibodies IgG, IgM    C. trachomatis/N. gonorrhoeae by AMP DNA    CANCELED: C. trachomatis/N. gonorrhoeae by AMP DNA          PLAN:   - Pap and HPV done today.  - Screening tests as ordered.  - Diet and exercise encouraged.  Condom use encouraged for STD prevention.  Seat belt use encouraged.    Counseling: indications for and frequency of periodic gynecologic exam  Patient was counseled today on postmenopausal issues.     The patient was counseled today on osteoporosis prevention, calcium supplementation, and regular weight bearing exercise. The patient was also counseled today on ACS PAP guidelines, with recommendations for yearly pelvic exams unless their uterus, cervix, and ovaries were removed for benign reasons; in that case, examinations every 3-5 years are recommended.  The patient was also counseled regarding monthly breast self-examination, routine STD screening for at-risk populations, prophylactic immunizations for transmitted infections such as  HPV, Pertussis, or Influenza as appropriate, and yearly mammograms when indicated by ACS guidelines.  She was advised to see her primary care physician for all other health maintenance.  FOLLOW-UP with me for next routine visit.   Kayli Trujillo PA-C

## 2024-10-11 NOTE — PATIENT INSTRUCTIONS
Vaginitis Prevention:  - Avoiding feminine products such as deoderant soaps, body wash, bubble bath, douches, scented toilet paper, deoderant tampons or pads, feminine wipes, chronic pad use, etc. If you wash with soap make sure its hypo allergic (free of added scents or colors)   - Avoiding other vulvovaginal irritants such as long hot baths, humidity, tight, synthetic clothing, chlorine and sitting around in wet bathing suits  - Wearing cotton underwear, avoiding thong underwear and no underwear to bed-wear loose cotton bottoms to bed   - Taking showers instead of baths and use a hair dryer on cool setting afterwards to dry  - Wearing cotton to exercise and shower immediately after exercise and change clothes  - Using polyurethane condoms without spermicide if sexually active and symptoms are triggered by intercourse  - Use laundry detergent without added perfumes or colors   - Do not have sexual intercourse until symptoms have gone away   - Increase your intake of probiotics--you can get these by eating yogurt/greek yogurt or by buying over the counter probiotic supplements     Probiotic Information:   Any probiotic you choose needs to have ALL of the following components (Each needs to be >10 colony forming units [CFUs]):   - L. Acidophilus  - L. Rhamnosus  - L. Fermentum     Recommend healthy life style choices including diet and exercise, calcium and vitamin D supplementation daily, healthy sexual decision making, development of healthy and safe relationships.    Calcium and Vitamin D Recommendation:   Persons nine to 18 years of age: 1,300 mg of calcium, 600 IU of vitamin D  Persons 19 to 50 years of age: 1,000 mg of calcium, 600 IU of vitamin D  Persons 51 to 70 years of age: 1,200 mg of calcium, 600 IU of vitamin D  Persons 71 years and older: 1,200 mg of calcium, 800 IU of vitamin D    I recommend a daily vitamin.

## 2024-10-12 LAB — BACTERIA UR CULT: NORMAL

## 2024-10-22 ENCOUNTER — TELEPHONE (OUTPATIENT)
Dept: OBSTETRICS AND GYNECOLOGY | Facility: CLINIC | Age: 54
End: 2024-10-22
Payer: MEDICARE

## 2024-10-22 NOTE — TELEPHONE ENCOUNTER
----- Message from aKyli Trujillo PA-C sent at 10/21/2024  3:37 PM CDT -----  Please call pt and let her know her pap smear was abnormal.  She will need a colposcopy done with one of the MD's.    Colposcopy is a procedure used by physicians that provides a magnified and illuminated view of the vulva, vaginal walls, and uterine cervix. If any abnormal cells are seen, a small biopsy will be taken. This procedure is relatively painless, but can cause some mild cramping, so I recommend you take Motrin 400 mg (2 regular Advil tablets) one hour prior to your appointment time.      If she has any questions, don't hesitate to let us know.  The doctor will review with you further in detail and answer any concerns at that visit.    Please schedule her for a colpo with one of the MDs.

## 2024-10-22 NOTE — TELEPHONE ENCOUNTER
Pt was notify in regards to her pap smear results she V/U info given, appt for Colpo has been schedule kg

## 2024-11-12 ENCOUNTER — PROCEDURE VISIT (OUTPATIENT)
Dept: OBSTETRICS AND GYNECOLOGY | Facility: CLINIC | Age: 54
End: 2024-11-12
Payer: MEDICARE

## 2024-11-12 VITALS — DIASTOLIC BLOOD PRESSURE: 94 MMHG | BODY MASS INDEX: 54.72 KG/M2 | WEIGHT: 293 LBS | SYSTOLIC BLOOD PRESSURE: 158 MMHG

## 2024-11-12 DIAGNOSIS — R87.612 LOW GRADE SQUAMOUS INTRAEPITHELIAL LESION (LGSIL) ON CERVICAL PAP SMEAR: Primary | ICD-10-CM

## 2024-11-12 PROCEDURE — 88305 TISSUE EXAM BY PATHOLOGIST: CPT | Mod: 59 | Performed by: PATHOLOGY

## 2024-11-12 PROCEDURE — 57454 BX/CURETT OF CERVIX W/SCOPE: CPT | Mod: S$GLB,,, | Performed by: OBSTETRICS & GYNECOLOGY

## 2024-11-12 RX ORDER — NYSTATIN 100000 [USP'U]/ML
4 SUSPENSION ORAL 4 TIMES DAILY
Qty: 473 ML | Refills: 1 | Status: SHIPPED | OUTPATIENT
Start: 2024-11-12 | End: 2024-11-19

## 2024-11-12 NOTE — PROCEDURES
Colposcopy    Date/Time: 11/12/2024 10:00 AM    Performed by: Shashank Mclain MD  Authorized by: Shashank Mclain MD    Consent obatined:  Prior to procedure the appropriate consent was completed and verified  Timeout:Immediately prior to procedure a time out was called to verify the correct patient, procedure, equipment, support staff and site/side marked as required  Assistants?: Yes      Colposcopy Site:  Cervix  Position:  Supine  Acrowhite Lesion? Yes    Atypical Vessels: No    Transformation Zone Adequate?: Yes    Biopsy?: Yes         Location:  Cervix ((12 00))  ECC Performed?: Yes    LEEP Performed?: No    Estimated blood loss (cc):  1   Patient tolerated the procedure well with no immediate complications.     Pap LSIL 10/2024, hpv other HR positive  Nystatin oral suspension refilled for pt

## 2024-11-15 LAB
FINAL PATHOLOGIC DIAGNOSIS: NORMAL
GROSS: NORMAL
Lab: NORMAL

## 2025-01-28 ENCOUNTER — HOSPITAL ENCOUNTER (EMERGENCY)
Facility: HOSPITAL | Age: 55
Discharge: HOME | End: 2025-01-28
Attending: EMERGENCY MEDICINE
Payer: MEDICARE

## 2025-01-28 VITALS
DIASTOLIC BLOOD PRESSURE: 94 MMHG | BODY MASS INDEX: 50.02 KG/M2 | OXYGEN SATURATION: 96 % | TEMPERATURE: 98 F | WEIGHT: 293 LBS | HEIGHT: 64 IN | SYSTOLIC BLOOD PRESSURE: 144 MMHG | HEART RATE: 74 BPM | RESPIRATION RATE: 18 BRPM

## 2025-01-28 DIAGNOSIS — S61.411A LACERATION OF RIGHT HAND WITHOUT FOREIGN BODY, INITIAL ENCOUNTER: Primary | ICD-10-CM

## 2025-01-28 PROCEDURE — 63600175 PHARM REV CODE 636 W HCPCS: Mod: ER | Performed by: NURSE PRACTITIONER

## 2025-01-28 PROCEDURE — 12001 RPR S/N/AX/GEN/TRNK 2.5CM/<: CPT | Mod: ER

## 2025-01-28 PROCEDURE — 99284 EMERGENCY DEPT VISIT MOD MDM: CPT | Mod: 25,ER

## 2025-01-28 PROCEDURE — 25000003 PHARM REV CODE 250: Mod: ER

## 2025-01-28 RX ORDER — ACETAMINOPHEN 325 MG/1
650 TABLET ORAL
Status: COMPLETED | OUTPATIENT
Start: 2025-01-28 | End: 2025-01-28

## 2025-01-28 RX ORDER — ACETAMINOPHEN 500 MG
500 TABLET ORAL EVERY 6 HOURS PRN
Qty: 30 TABLET | Refills: 0 | Status: SHIPPED | OUTPATIENT
Start: 2025-01-28

## 2025-01-28 RX ORDER — ONDANSETRON 4 MG/1
4 TABLET, ORALLY DISINTEGRATING ORAL
Status: COMPLETED | OUTPATIENT
Start: 2025-01-28 | End: 2025-01-28

## 2025-01-28 RX ORDER — MUPIROCIN 20 MG/G
OINTMENT TOPICAL 3 TIMES DAILY
Qty: 15 G | Refills: 0 | Status: SHIPPED | OUTPATIENT
Start: 2025-01-28

## 2025-01-28 RX ORDER — LIDOCAINE HYDROCHLORIDE 10 MG/ML
10 INJECTION, SOLUTION INFILTRATION; PERINEURAL
Status: COMPLETED | OUTPATIENT
Start: 2025-01-28 | End: 2025-01-28

## 2025-01-28 RX ADMIN — LIDOCAINE HYDROCHLORIDE 10 ML: 10 INJECTION, SOLUTION INFILTRATION; PERINEURAL at 03:01

## 2025-01-28 RX ADMIN — ACETAMINOPHEN 650 MG: 325 TABLET ORAL at 03:01

## 2025-01-28 RX ADMIN — ONDANSETRON 4 MG: 4 TABLET, ORALLY DISINTEGRATING ORAL at 03:01

## 2025-01-28 NOTE — ED PROVIDER NOTES
Encounter Date: 2025       History     Chief Complaint   Patient presents with    Laceration     CUT HAND WHILE TRYING TO CUT SAUSAGE IN KITCHEN    UNABLE TO WRAP HAND BC PT DOES NOT DO WELL WITH BLOOD. SHE IS CURRENTLY GAGGING IN TRIAGE AND UNABLE TO SPEECH.      Patient is a 54-year-old female presenting for evaluation of laceration to her right hand.  Patient states that she was cutting sausage prior to arrival when she accidentally cut her hand.  Reports she instantly saw blood which makes her very queasy.  States she has not been able to look at laceration due to it making her feel very dizzy.  Patient notes a stinging sensation to the wound.  Denies taking medication for alleviation.  Denies numbness or tingling to the extremity.  Patient reports she is currently on blood thinners.  Patient denies any other symptoms.        Review of patient's allergies indicates:   Allergen Reactions    Tramadol Swelling and Anaphylaxis     Throat swelling  Throat swelling    Codeine Other (See Comments)     Unknown reaction  Unknown reaction    Ketorolac Nausea Only     Past Medical History:   Diagnosis Date    Abdominal pain 2022    Anemia     Anesthesia     PT HAS BULGING DISC/ HERNIATED  DISC C2-C7    Anxiety     Arthritis     RA    Asthma     Back problem     Blood transfusion     Cervical pain     COPD (chronic obstructive pulmonary disease)     CTS (carpal tunnel syndrome)     Edema 2022    Fatty liver 2021    GERD (gastroesophageal reflux disease)     Joint pain     shoulder    Joint pain     knee    Liver lesion 2021    Lumbar pain     Migraine     Neck pain     Panic attacks     Rotator cuff tear     RIGHT    Sciatica     Thyroid disease      Past Surgical History:   Procedure Laterality Date    APPENDECTOMY       SECTION, LOW TRANSVERSE      x 2    COLONOSCOPY Left 2021    Procedure: COLONOSCOPY;  Surgeon: Akila Li MD;  Location: [facility];  Service: Endoscopy;   Laterality: Left;  BMI 51.67  covid test [place] 6/6  pt requested [facility]  cardiology clearance scanned into media tab dated 5/20-MS    HYSTERECTOMY      left ankle surg      plates and screws    OOPHORECTOMY      TONSILLECTOMY       Family History   Problem Relation Name Age of Onset    Diabetes Sister      Breast cancer Maternal Aunt      Breast cancer Paternal Grandmother       Social History     Tobacco Use    Smoking status: Every Day     Current packs/day: 1.50     Average packs/day: 1.5 packs/day for 18.0 years (27.0 ttl pk-yrs)     Types: Cigarettes     Start date: 1/22/2007    Smokeless tobacco: Former   Substance Use Topics    Alcohol use: Yes     Alcohol/week: 70.0 standard drinks of alcohol     Types: 70 Shots of liquor per week    Drug use: No     Review of Systems   Gastrointestinal:  Positive for nausea.   Musculoskeletal:  Positive for arthralgias.   Skin:  Positive for wound.   Neurological:  Negative for weakness and numbness.       Physical Exam     Initial Vitals [01/28/25 1329]   BP Pulse Resp Temp SpO2   (!) 148/72 101 20 98.3 °F (36.8 °C) 99 %      MAP       --         Physical Exam    Constitutional: She appears well-developed and well-nourished. She is not diaphoretic. No distress.   HENT:   Head: Normocephalic and atraumatic.   Nose: Nose normal.   Eyes: Conjunctivae and EOM are normal. Right eye exhibits no discharge. Left eye exhibits no discharge.   Neck:   Normal range of motion.  Pulmonary/Chest: No respiratory distress.   Musculoskeletal:         General: Normal range of motion.      Right hand: Laceration (2cm shallow laceration noted to the palm of the right hand) present. No swelling, deformity or tenderness. Normal capillary refill. Normal pulse.      Left hand: Normal.      Cervical back: Normal range of motion.     Neurological: She is alert and oriented to person, place, and time.   Skin: Skin is warm and dry. Capillary refill takes less than 2 seconds.   Psychiatric: She has  a normal mood and affect.         ED Course   Lac Repair    Date/Time: 1/28/2025 3:59 PM    Performed by: Cherry Burnett PA-C  Authorized by: Jen Nolan DO    Consent:     Risks discussed:  Infection and pain  Laceration details:     Location:  Hand    Hand location:  R palm    Length (cm):  2  Treatment:     Area cleansed with:  Saline    Amount of cleaning:  Standard    Irrigation solution:  Sterile saline    Visualized foreign bodies/material removed: no      Debridement:  None    Undermining:  None  Skin repair:     Repair method:  Sutures    Suture size:  3-0    Suture material:  Prolene    Suture technique:  Simple interrupted  Approximation:     Approximation:  Close  Repair type:     Repair type:  Simple  Post-procedure details:     Dressing:  Sterile dressing    Labs Reviewed - No data to display       Imaging Results    None          Medications   LIDOcaine HCL 10 mg/ml (1%) injection 10 mL (10 mLs Infiltration Given 1/28/25 1501)   ondansetron disintegrating tablet 4 mg (4 mg Oral Given 1/28/25 1501)   acetaminophen tablet 650 mg (650 mg Oral Given 1/28/25 1501)     Medical Decision Making  Patient is a 54-year-old female presenting for evaluation of laceration to her right hand.      Differential includes laceration, avulsion, fracture however less likely due to mechanics, dislocation however less likely due to mechanism.    Patient is alert and afebrile.  Patient is nontoxic appearing, not in distress.  Patient speaking in full sentences.  On physical exam shallow 2 cm laceration noted to the palm of the right hand.  Mild tenderness to palpation.  No surrounding erythema or swelling.  No discharge present.  Bleeding is controlled.  Normal range of motion of right hand and arm without pain.  Normal capillary refill.  Strong distal radial pulse bilaterally.  Sensations intact.  Normal strength.  Laceration repair well tolerated.  Per chart review the patient is up-to-date on tetanus vaccine, last  done on 07/2024.  Discussed with patient importance of keeping wound clean and dry.  Advised patient to take Tylenol or ibuprofen as directed for pain.  Advised patient to follow up with urgent care, primary care, or emergency department in 7-10 days for removal of sutures.  Return precautions given for new or worsening symptoms such as but not limited to swelling, erythema, discharge, fever to wound.  Recommended follow up PCP in 2 days.  Patient expresses understanding of return precautions and follow up plan.  Patient is stable at time of discharge.    Risk  OTC drugs.  Prescription drug management.                                      Clinical Impression:  Final diagnoses:  [S61.411A] Laceration of right hand without foreign body, initial encounter (Primary)          ED Disposition Condition    Discharge Stable          ED Prescriptions       Medication Sig Dispense Start Date End Date Auth. Provider    acetaminophen (TYLENOL) 500 MG tablet Take 1 tablet (500 mg total) by mouth every 6 (six) hours as needed for Pain. 30 tablet 1/28/2025 -- Cherry Burnett PA-C    mupirocin (BACTROBAN) 2 % ointment Apply topically 3 (three) times daily. 15 g 1/28/2025 -- Cherry Burnett PA-C          Follow-up Information       Follow up With Specialties Details Why Contact Info    Jaxson Oh MD Family Medicine Schedule an appointment as soon as possible for a visit in 1 week For follow up, For suture removal [address]  [place] [address]  [phone]      [facility] ED Emergency Medicine Go to  If new symptoms develop or symptoms worsen [address] [facility] [address] [phone]             Cherry Burnett PA-C  01/28/25 8682

## 2025-01-28 NOTE — DISCHARGE INSTRUCTIONS
Follow up with her PCP, urgent care, emergency department in 7-10 days for removal of sutures.    Thank you for coming to our Emergency Department today. It is important to remember that some problems or medical conditions are difficult to diagnose and may not be found or addressed during your Emergency Department visit.  These conditions often start with non-specific symptoms and can only be diagnosed on follow up visits with your primary care physician or specialist when the symptoms continue or change. Please remember that all medical conditions can change, and we cannot predict how you will be feeling tomorrow or the next day. Return to the ER with any questions/concerns, new/concerning symptoms, worsening or failure to improve.       Be sure to follow up with your primary care doctor and review all labs/imaging/tests that were performed during your ER visit with them. It is very common for us to identify non-emergent incidental findings which must be followed up with your primary care physician.  Some labs/imaging/tests may be outside of the normal range, and require non-emergent follow-up and/or further investigation/treatment/procedures/testing to help diagnose/exclude/prevent complications or other potentially serious medical conditions. Some abnormalities may not have been discussed or addressed during your ER visit.     An ER visit does not replace a primary care visit, and many screening tests or follow-up tests cannot be ordered by an ER doctor or performed by the ER. Some tests may even require pre-approval.    If you do not have a primary care doctor, you may contact the one listed on your discharge paperwork or you may also call the [facility] Appointment Desk at [phone] , or [address] [place] [address] at  [phone] to schedule an appointment, or establish care with a primary care doctor or even a specialist and to obtain information about local resources. It is important to your health that you have  a primary care doctor.    Please take all medications as directed. We have done our best to select a medication for you that will treat your condition however, all medications may potentially have side-effects and it is impossible to predict which medications may give you side-effects or what those side-effects (if any) those medications may give you.  If you feel that you are having a negative effect or side-effect of any medication you should stop taking those medications immediately and seek medical attention. If you feel that you are having a life-threatening reaction call 911.        Do not drive, swim, climb to height, take a bath, operate heavy machinery, drink alcohol or take potentially sedating medications, sign any legal documents or make any important decisions for 24 hours if you have received any pain medications, sedatives or mood altering drugs during your ER visit or within 24 hours of taking them if they have been prescribed to you.     You can find additional resources for Dentists, hearing aids, durable medical equipment, low cost pharmacies and other resources at [email or web address]

## 2025-04-02 ENCOUNTER — NURSE TRIAGE (OUTPATIENT)
Dept: ADMINISTRATIVE | Facility: CLINIC | Age: 55
End: 2025-04-02
Payer: MEDICARE

## 2025-04-02 ENCOUNTER — OCHSNER VIRTUAL EMERGENCY DEPARTMENT (OUTPATIENT)
Facility: CLINIC | Age: 55
End: 2025-04-02
Payer: MEDICARE

## 2025-04-02 ENCOUNTER — PATIENT OUTREACH (OUTPATIENT)
Facility: OTHER | Age: 55
End: 2025-04-02
Payer: MEDICARE

## 2025-04-02 DIAGNOSIS — N89.8 VAGINAL DISCHARGE: Primary | ICD-10-CM

## 2025-04-02 NOTE — PLAN OF CARE-OVED
[facility] Emergency Department Plan of Care Note  Referral Source: Nurse On-Call                               Chief Complaint   Patient presents with    Vaginal Discharge     Vaginal discharge and dysuria. No flank pain, fever, abd pain, pelvic pain       Recommendation: Primary Care                       Recommendation comment: PCP/gyn/UC appropriate. hx yeast infx in past, no + Ucx in system    Encounter Diagnosis   Name Primary?    Vaginal discharge Yes

## 2025-04-02 NOTE — TELEPHONE ENCOUNTER
Spoke with pt who reports that she has been having vaginal itching, and burning that started Sunday night. She also reports having burning with urination. She is unsure about having vaginal discharge. She reports taking 2 pills for vaginal yeast infection , 1 last night, and 1 Sunday, but still reports having symptoms. Advised to be seen today in office, she is asking if possible to be seen tomorrow.    3:26 secure chat to MARCIE    Dr. Arroyo states ok to be seen tomorrow by PCP, GYN, and if no appointment UC.    Pt would like GYN appointment on [facility]. Pt informed no appointment available for tomorrow on [facility]. Declined to be seen at another location. States will call back tomorrow. Dr. Arroyo Aware     Reason for Disposition   MILD-MODERATE vaginal pain and present > 24 hours (Exception: Chronic pain.)    Additional Information   Negative: Sounds like a life-threatening emergency to the triager   Negative: Patient sounds very sick or weak to the triager   Negative: SEVERE vaginal pain and not improved 2 hours after pain medicine   Negative: Genital area looks infected (e.g., draining sore, spreading redness) and fever   Negative: Something is hanging out of the vagina and can't easily be pushed back inside    Protocols used: Vaginal Symptoms-A-OH

## 2025-04-02 NOTE — PROGRESS NOTES
"Patient called the [facility] RN on 4/2/25 for c/o "vaginal itching, and burning that started Sunday night. She also reports having burning with urination. She is unsure about having vaginal discharge. She reports taking 2 pills for vaginal yeast infection , 1 last night, and 1 Sunday, but still reports having symptoms". [facility] RN consult [facility].     [facility] Provider Dr Gilda Arroyo disposition recommendation Primary Care                    "Recommendation comment: PCP/gyn/UC appropriate. hx yeast infx in past, no + Ucx in system".    Follow up scheduled for 4/4/25 to assess for additional needs.      "

## 2025-04-04 ENCOUNTER — TELEPHONE (OUTPATIENT)
Dept: OBSTETRICS AND GYNECOLOGY | Facility: CLINIC | Age: 55
End: 2025-04-04
Payer: MEDICARE

## 2025-04-07 ENCOUNTER — PATIENT OUTREACH (OUTPATIENT)
Facility: OTHER | Age: 55
End: 2025-04-07
Payer: MEDICARE

## 2025-04-07 NOTE — PROGRESS NOTES
On 4/2/25, the patient stated she would contact her PCP, who is not affiliated with [facility]. A follow-up call was made, but there was no answer. A voicemail was left requesting a return call.